# Patient Record
Sex: MALE | Race: WHITE | NOT HISPANIC OR LATINO | Employment: UNEMPLOYED | URBAN - METROPOLITAN AREA
[De-identification: names, ages, dates, MRNs, and addresses within clinical notes are randomized per-mention and may not be internally consistent; named-entity substitution may affect disease eponyms.]

---

## 2018-11-22 ENCOUNTER — APPOINTMENT (EMERGENCY)
Dept: INTERVENTIONAL RADIOLOGY/VASCULAR | Facility: HOSPITAL | Age: 58
DRG: 246 | End: 2018-11-22
Attending: INTERNAL MEDICINE
Payer: COMMERCIAL

## 2018-11-22 ENCOUNTER — HOSPITAL ENCOUNTER (INPATIENT)
Facility: HOSPITAL | Age: 58
LOS: 10 days | DRG: 246 | End: 2018-12-02
Attending: EMERGENCY MEDICINE | Admitting: ANESTHESIOLOGY
Payer: COMMERCIAL

## 2018-11-22 ENCOUNTER — APPOINTMENT (INPATIENT)
Dept: RADIOLOGY | Facility: HOSPITAL | Age: 58
DRG: 246 | End: 2018-11-22
Payer: COMMERCIAL

## 2018-11-22 DIAGNOSIS — I21.3 STEMI (ST ELEVATION MYOCARDIAL INFARCTION) (HCC): ICD-10-CM

## 2018-11-22 DIAGNOSIS — R33.9 URINARY RETENTION: ICD-10-CM

## 2018-11-22 DIAGNOSIS — I25.10 CAD (CORONARY ARTERY DISEASE): ICD-10-CM

## 2018-11-22 DIAGNOSIS — I46.9 CARDIAC ARREST (HCC): Primary | ICD-10-CM

## 2018-11-22 DIAGNOSIS — I47.2 V-TACH (HCC): ICD-10-CM

## 2018-11-22 PROBLEM — F41.9 ANXIETY: Status: ACTIVE | Noted: 2018-11-22

## 2018-11-22 PROBLEM — N17.9 AKI (ACUTE KIDNEY INJURY) (HCC): Status: ACTIVE | Noted: 2018-11-22

## 2018-11-22 PROBLEM — D72.825 BANDEMIA: Status: ACTIVE | Noted: 2018-11-22

## 2018-11-22 PROBLEM — I49.01 VENTRICULAR FIBRILLATION (HCC): Status: ACTIVE | Noted: 2018-11-22

## 2018-11-22 PROBLEM — R74.01 TRANSAMINITIS: Status: ACTIVE | Noted: 2018-11-22

## 2018-11-22 LAB
ALBUMIN SERPL BCP-MCNC: 3.8 G/DL (ref 3.5–5)
ALBUMIN SERPL BCP-MCNC: 4.2 G/DL (ref 3.5–5)
ALP SERPL-CCNC: 101 U/L (ref 46–116)
ALP SERPL-CCNC: 129 U/L (ref 46–116)
ALT SERPL W P-5'-P-CCNC: 374 U/L (ref 12–78)
ALT SERPL W P-5'-P-CCNC: 428 U/L (ref 12–78)
ANION GAP BLD CALC-SCNC: 22 MMOL/L (ref 4–13)
ANION GAP SERPL CALCULATED.3IONS-SCNC: 15 MMOL/L (ref 4–13)
ANION GAP SERPL CALCULATED.3IONS-SCNC: 20 MMOL/L (ref 4–13)
APTT PPP: 38 SECONDS (ref 26–38)
APTT PPP: 84 SECONDS (ref 26–38)
ARTERIAL PATENCY WRIST A: YES
AST SERPL W P-5'-P-CCNC: 372 U/L (ref 5–45)
AST SERPL W P-5'-P-CCNC: 416 U/L (ref 5–45)
BACTERIA UR QL AUTO: ABNORMAL /HPF
BASE EXCESS BLDA CALC-SCNC: -6.1 MMOL/L
BASE EXCESS BLDA CALC-SCNC: -9.7 MMOL/L
BASOPHILS # BLD AUTO: 0.05 THOUSANDS/ΜL (ref 0–0.1)
BASOPHILS # BLD MANUAL: 0 THOUSAND/UL (ref 0–0.1)
BASOPHILS NFR BLD AUTO: 0 % (ref 0–1)
BASOPHILS NFR MAR MANUAL: 0 % (ref 0–1)
BILIRUB DIRECT SERPL-MCNC: 0.21 MG/DL (ref 0–0.2)
BILIRUB SERPL-MCNC: 0.6 MG/DL (ref 0.2–1)
BILIRUB SERPL-MCNC: 0.7 MG/DL (ref 0.2–1)
BILIRUB UR QL STRIP: NEGATIVE
BODY TEMPERATURE: 97.3 DEGREES FEHRENHEIT
BUN BLD-MCNC: 21 MG/DL (ref 5–25)
BUN SERPL-MCNC: 19 MG/DL (ref 5–25)
BUN SERPL-MCNC: 20 MG/DL (ref 5–25)
CA-I BLD-SCNC: 1.14 MMOL/L (ref 1.12–1.32)
CA-I BLD-SCNC: 1.15 MMOL/L (ref 1.12–1.32)
CALCIUM SERPL-MCNC: 8.8 MG/DL (ref 8.3–10.1)
CALCIUM SERPL-MCNC: 9.1 MG/DL (ref 8.3–10.1)
CHLORIDE BLD-SCNC: 104 MMOL/L (ref 100–108)
CHLORIDE SERPL-SCNC: 101 MMOL/L (ref 100–108)
CHLORIDE SERPL-SCNC: 102 MMOL/L (ref 100–108)
CHOLEST SERPL-MCNC: 141 MG/DL (ref 50–200)
CLARITY UR: CLEAR
CO2 SERPL-SCNC: 18 MMOL/L (ref 21–32)
CO2 SERPL-SCNC: 22 MMOL/L (ref 21–32)
COLOR UR: YELLOW
CREAT BLD-MCNC: 1.3 MG/DL (ref 0.6–1.3)
CREAT SERPL-MCNC: 1.15 MG/DL (ref 0.6–1.3)
CREAT SERPL-MCNC: 1.57 MG/DL (ref 0.6–1.3)
EOSINOPHIL # BLD AUTO: 0.01 THOUSAND/ΜL (ref 0–0.61)
EOSINOPHIL # BLD MANUAL: 0.31 THOUSAND/UL (ref 0–0.4)
EOSINOPHIL NFR BLD AUTO: 0 % (ref 0–6)
EOSINOPHIL NFR BLD MANUAL: 1 % (ref 0–6)
ERYTHROCYTE [DISTWIDTH] IN BLOOD BY AUTOMATED COUNT: 13.4 % (ref 11.6–15.1)
ERYTHROCYTE [DISTWIDTH] IN BLOOD BY AUTOMATED COUNT: 13.6 % (ref 11.6–15.1)
GFR SERPL CREATININE-BSD FRML MDRD: 49 ML/MIN/1.73SQ M
GFR SERPL CREATININE-BSD FRML MDRD: 57 ML/MIN/1.73SQ M
GFR SERPL CREATININE-BSD FRML MDRD: 71 ML/MIN/1.73SQ M
GLUCOSE SERPL-MCNC: 226 MG/DL (ref 65–140)
GLUCOSE SERPL-MCNC: 257 MG/DL (ref 65–140)
GLUCOSE SERPL-MCNC: 313 MG/DL (ref 65–140)
GLUCOSE SERPL-MCNC: 323 MG/DL (ref 65–140)
GLUCOSE SERPL-MCNC: 332 MG/DL (ref 65–140)
GLUCOSE UR STRIP-MCNC: ABNORMAL MG/DL
HCO3 BLDA-SCNC: 17.3 MMOL/L (ref 22–28)
HCO3 BLDA-SCNC: 19.3 MMOL/L (ref 22–28)
HCT VFR BLD AUTO: 44.6 % (ref 36.5–49.3)
HCT VFR BLD AUTO: 48.4 % (ref 36.5–49.3)
HCT VFR BLD CALC: 49 % (ref 36.5–49.3)
HDLC SERPL-MCNC: 37 MG/DL (ref 40–60)
HGB BLD-MCNC: 14.6 G/DL (ref 12–17)
HGB BLD-MCNC: 15.9 G/DL (ref 12–17)
HGB BLDA-MCNC: 16.7 G/DL (ref 12–17)
HGB UR QL STRIP.AUTO: ABNORMAL
HOROWITZ INDEX BLDA+IHG-RTO: 65 MM[HG]
HOROWITZ INDEX BLDA+IHG-RTO: 65 MM[HG]
HYALINE CASTS #/AREA URNS LPF: ABNORMAL /LPF
I-TIME: 0.9
IMM GRANULOCYTES # BLD AUTO: 0.3 THOUSAND/UL (ref 0–0.2)
IMM GRANULOCYTES NFR BLD AUTO: 1 % (ref 0–2)
INR PPP: 1.17 (ref 0.86–1.17)
INR PPP: 1.36 (ref 0.86–1.17)
INR PPP: 2.09 (ref 0.86–1.17)
KETONES UR STRIP-MCNC: NEGATIVE MG/DL
LACTATE SERPL-SCNC: 2.5 MMOL/L (ref 0.5–2)
LACTATE SERPL-SCNC: 5.4 MMOL/L (ref 0.5–2)
LDLC SERPL CALC-MCNC: 81 MG/DL (ref 0–100)
LEUKOCYTE ESTERASE UR QL STRIP: NEGATIVE
LIPASE SERPL-CCNC: 506 U/L (ref 73–393)
LYMPHOCYTES # BLD AUTO: 1.02 THOUSANDS/ΜL (ref 0.6–4.47)
LYMPHOCYTES # BLD AUTO: 13 % (ref 14–44)
LYMPHOCYTES # BLD AUTO: 4.05 THOUSAND/UL (ref 0.6–4.47)
LYMPHOCYTES NFR BLD AUTO: 4 % (ref 14–44)
MAGNESIUM SERPL-MCNC: 2.6 MG/DL (ref 1.6–2.6)
MAGNESIUM SERPL-MCNC: 3.3 MG/DL (ref 1.6–2.6)
MCH RBC QN AUTO: 30.1 PG (ref 26.8–34.3)
MCH RBC QN AUTO: 30.1 PG (ref 26.8–34.3)
MCHC RBC AUTO-ENTMCNC: 32.7 G/DL (ref 31.4–37.4)
MCHC RBC AUTO-ENTMCNC: 32.9 G/DL (ref 31.4–37.4)
MCV RBC AUTO: 92 FL (ref 82–98)
MCV RBC AUTO: 92 FL (ref 82–98)
MONOCYTES # BLD AUTO: 0.93 THOUSAND/UL (ref 0–1.22)
MONOCYTES # BLD AUTO: 1.43 THOUSAND/ΜL (ref 0.17–1.22)
MONOCYTES NFR BLD AUTO: 6 % (ref 4–12)
MONOCYTES NFR BLD: 3 % (ref 4–12)
NEUTROPHILS # BLD AUTO: 20.72 THOUSANDS/ΜL (ref 1.85–7.62)
NEUTROPHILS # BLD MANUAL: 25.85 THOUSAND/UL (ref 1.85–7.62)
NEUTS BAND NFR BLD MANUAL: 8 % (ref 0–8)
NEUTS SEG NFR BLD AUTO: 75 % (ref 43–75)
NEUTS SEG NFR BLD AUTO: 89 % (ref 43–75)
NITRITE UR QL STRIP: NEGATIVE
NON-SQ EPI CELLS URNS QL MICRO: ABNORMAL /HPF
NONHDLC SERPL-MCNC: 104 MG/DL
NRBC BLD AUTO-RTO: 0 /100 WBCS
NRBC BLD AUTO-RTO: 0 /100 WBCS
O2 CT BLDA-SCNC: 20.7 ML/DL (ref 16–23)
O2 CT BLDA-SCNC: 21 ML/DL (ref 16–23)
OXYHGB MFR BLDA: 97.2 % (ref 94–97)
OXYHGB MFR BLDA: 98.4 % (ref 94–97)
PCO2 BLD: 19 MMOL/L (ref 21–32)
PCO2 BLDA: 38.3 MM HG (ref 36–44)
PCO2 BLDA: 41.7 MM HG (ref 36–44)
PEEP RESPIRATORY: 7 CM[H2O]
PEEP RESPIRATORY: 7 CM[H2O]
PH BLDA: 7.24 [PH] (ref 7.35–7.45)
PH BLDA: 7.32 [PH] (ref 7.35–7.45)
PH UR STRIP.AUTO: 6 [PH] (ref 4.5–8)
PHOSPHATE SERPL-MCNC: 4.2 MG/DL (ref 2.7–4.5)
PHOSPHATE SERPL-MCNC: 7.2 MG/DL (ref 2.7–4.5)
PLATELET # BLD AUTO: 323 THOUSANDS/UL (ref 149–390)
PLATELET # BLD AUTO: 369 THOUSANDS/UL (ref 149–390)
PLATELET BLD QL SMEAR: ADEQUATE
PMV BLD AUTO: 9.4 FL (ref 8.9–12.7)
PMV BLD AUTO: 9.7 FL (ref 8.9–12.7)
PO2 BLDA: 153.9 MM HG (ref 75–129)
PO2 BLDA: 272.6 MM HG (ref 75–129)
POTASSIUM BLD-SCNC: 3.4 MMOL/L (ref 3.5–5.3)
POTASSIUM SERPL-SCNC: 3.3 MMOL/L (ref 3.5–5.3)
POTASSIUM SERPL-SCNC: 3.8 MMOL/L (ref 3.5–5.3)
PROT SERPL-MCNC: 7.3 G/DL (ref 6.4–8.2)
PROT SERPL-MCNC: 8.1 G/DL (ref 6.4–8.2)
PROT UR STRIP-MCNC: ABNORMAL MG/DL
PROTHROMBIN TIME: 14.8 SECONDS (ref 11.8–14.2)
PROTHROMBIN TIME: 16.7 SECONDS (ref 11.8–14.2)
PROTHROMBIN TIME: 23.2 SECONDS (ref 11.8–14.2)
RBC # BLD AUTO: 4.85 MILLION/UL (ref 3.88–5.62)
RBC # BLD AUTO: 5.28 MILLION/UL (ref 3.88–5.62)
RBC #/AREA URNS AUTO: ABNORMAL /HPF
SODIUM BLD-SCNC: 140 MMOL/L (ref 136–145)
SODIUM SERPL-SCNC: 139 MMOL/L (ref 136–145)
SODIUM SERPL-SCNC: 139 MMOL/L (ref 136–145)
SP GR UR STRIP.AUTO: 1.01 (ref 1–1.03)
SPECIMEN SOURCE: ABNORMAL
T4 FREE SERPL-MCNC: 0.89 NG/DL (ref 0.76–1.46)
TOTAL CELLS COUNTED SPEC: 100
TRIGL SERPL-MCNC: 117 MG/DL
TROPONIN I BLD-MCNC: 0.54 NG/ML (ref 0–0.08)
TROPONIN I SERPL-MCNC: 1.11 NG/ML
TROPONIN I SERPL-MCNC: 10.63 NG/ML
TROPONIN I SERPL-MCNC: 33.74 NG/ML
TROPONIN I SERPL-MCNC: 5.2 NG/ML
TSH SERPL DL<=0.05 MIU/L-ACNC: 5.99 UIU/ML (ref 0.36–3.74)
UROBILINOGEN UR QL STRIP.AUTO: 0.2 E.U./DL
VENT AC: 18
VENT AC: 22
VENT- AC: AC
VENT- AC: AC
VT SETTING VENT: 400 ML
VT SETTING VENT: 400 ML
WBC # BLD AUTO: 23.53 THOUSAND/UL (ref 4.31–10.16)
WBC # BLD AUTO: 31.15 THOUSAND/UL (ref 4.31–10.16)
WBC #/AREA URNS AUTO: ABNORMAL /HPF

## 2018-11-22 PROCEDURE — 027034Z DILATION OF CORONARY ARTERY, ONE ARTERY WITH DRUG-ELUTING INTRALUMINAL DEVICE, PERCUTANEOUS APPROACH: ICD-10-PCS | Performed by: INTERNAL MEDICINE

## 2018-11-22 PROCEDURE — 36600 WITHDRAWAL OF ARTERIAL BLOOD: CPT

## 2018-11-22 PROCEDURE — C1725 CATH, TRANSLUMIN NON-LASER: HCPCS | Performed by: INTERNAL MEDICINE

## 2018-11-22 PROCEDURE — C1887 CATHETER, GUIDING: HCPCS | Performed by: INTERNAL MEDICINE

## 2018-11-22 PROCEDURE — 84100 ASSAY OF PHOSPHORUS: CPT | Performed by: PHYSICIAN ASSISTANT

## 2018-11-22 PROCEDURE — 5A1935Z RESPIRATORY VENTILATION, LESS THAN 24 CONSECUTIVE HOURS: ICD-10-PCS | Performed by: EMERGENCY MEDICINE

## 2018-11-22 PROCEDURE — C1769 GUIDE WIRE: HCPCS | Performed by: INTERNAL MEDICINE

## 2018-11-22 PROCEDURE — 84443 ASSAY THYROID STIM HORMONE: CPT | Performed by: PHYSICIAN ASSISTANT

## 2018-11-22 PROCEDURE — 99152 MOD SED SAME PHYS/QHP 5/>YRS: CPT | Performed by: INTERNAL MEDICINE

## 2018-11-22 PROCEDURE — 99291 CRITICAL CARE FIRST HOUR: CPT | Performed by: PHYSICIAN ASSISTANT

## 2018-11-22 PROCEDURE — 80061 LIPID PANEL: CPT | Performed by: PHYSICIAN ASSISTANT

## 2018-11-22 PROCEDURE — 83735 ASSAY OF MAGNESIUM: CPT | Performed by: PHYSICIAN ASSISTANT

## 2018-11-22 PROCEDURE — 87040 BLOOD CULTURE FOR BACTERIA: CPT | Performed by: PHYSICIAN ASSISTANT

## 2018-11-22 PROCEDURE — 85730 THROMBOPLASTIN TIME PARTIAL: CPT | Performed by: EMERGENCY MEDICINE

## 2018-11-22 PROCEDURE — 94760 N-INVAS EAR/PLS OXIMETRY 1: CPT

## 2018-11-22 PROCEDURE — 71045 X-RAY EXAM CHEST 1 VIEW: CPT

## 2018-11-22 PROCEDURE — 85610 PROTHROMBIN TIME: CPT | Performed by: EMERGENCY MEDICINE

## 2018-11-22 PROCEDURE — 80047 BASIC METABLC PNL IONIZED CA: CPT

## 2018-11-22 PROCEDURE — 80053 COMPREHEN METABOLIC PANEL: CPT | Performed by: PHYSICIAN ASSISTANT

## 2018-11-22 PROCEDURE — 85610 PROTHROMBIN TIME: CPT | Performed by: PHYSICIAN ASSISTANT

## 2018-11-22 PROCEDURE — 85014 HEMATOCRIT: CPT

## 2018-11-22 PROCEDURE — C9606 PERC D-E COR REVASC W AMI S: HCPCS | Performed by: INTERNAL MEDICINE

## 2018-11-22 PROCEDURE — 4A133B1 MONITORING OF ARTERIAL PRESSURE, PERIPHERAL, PERCUTANEOUS APPROACH: ICD-10-PCS | Performed by: EMERGENCY MEDICINE

## 2018-11-22 PROCEDURE — 93005 ELECTROCARDIOGRAM TRACING: CPT

## 2018-11-22 PROCEDURE — 84484 ASSAY OF TROPONIN QUANT: CPT | Performed by: PHYSICIAN ASSISTANT

## 2018-11-22 PROCEDURE — C1760 CLOSURE DEV, VASC: HCPCS | Performed by: INTERNAL MEDICINE

## 2018-11-22 PROCEDURE — B211YZZ FLUOROSCOPY OF MULTIPLE CORONARY ARTERIES USING OTHER CONTRAST: ICD-10-PCS | Performed by: INTERNAL MEDICINE

## 2018-11-22 PROCEDURE — 80048 BASIC METABOLIC PNL TOTAL CA: CPT | Performed by: EMERGENCY MEDICINE

## 2018-11-22 PROCEDURE — 80076 HEPATIC FUNCTION PANEL: CPT | Performed by: EMERGENCY MEDICINE

## 2018-11-22 PROCEDURE — 85027 COMPLETE CBC AUTOMATED: CPT | Performed by: EMERGENCY MEDICINE

## 2018-11-22 PROCEDURE — C9600 PERC DRUG-EL COR STENT SING: HCPCS | Performed by: INTERNAL MEDICINE

## 2018-11-22 PROCEDURE — 85610 PROTHROMBIN TIME: CPT | Performed by: INTERNAL MEDICINE

## 2018-11-22 PROCEDURE — 85007 BL SMEAR W/DIFF WBC COUNT: CPT | Performed by: EMERGENCY MEDICINE

## 2018-11-22 PROCEDURE — 96375 TX/PRO/DX INJ NEW DRUG ADDON: CPT

## 2018-11-22 PROCEDURE — C1894 INTRO/SHEATH, NON-LASER: HCPCS | Performed by: INTERNAL MEDICINE

## 2018-11-22 PROCEDURE — 83735 ASSAY OF MAGNESIUM: CPT | Performed by: EMERGENCY MEDICINE

## 2018-11-22 PROCEDURE — 93458 L HRT ARTERY/VENTRICLE ANGIO: CPT | Performed by: INTERNAL MEDICINE

## 2018-11-22 PROCEDURE — 92950 HEART/LUNG RESUSCITATION CPR: CPT

## 2018-11-22 PROCEDURE — 84100 ASSAY OF PHOSPHORUS: CPT | Performed by: EMERGENCY MEDICINE

## 2018-11-22 PROCEDURE — 03HY32Z INSERTION OF MONITORING DEVICE INTO UPPER ARTERY, PERCUTANEOUS APPROACH: ICD-10-PCS | Performed by: EMERGENCY MEDICINE

## 2018-11-22 PROCEDURE — 84484 ASSAY OF TROPONIN QUANT: CPT | Performed by: EMERGENCY MEDICINE

## 2018-11-22 PROCEDURE — 4A023N7 MEASUREMENT OF CARDIAC SAMPLING AND PRESSURE, LEFT HEART, PERCUTANEOUS APPROACH: ICD-10-PCS | Performed by: INTERNAL MEDICINE

## 2018-11-22 PROCEDURE — 94002 VENT MGMT INPAT INIT DAY: CPT

## 2018-11-22 PROCEDURE — 83605 ASSAY OF LACTIC ACID: CPT | Performed by: PHYSICIAN ASSISTANT

## 2018-11-22 PROCEDURE — 85025 COMPLETE CBC W/AUTO DIFF WBC: CPT | Performed by: INTERNAL MEDICINE

## 2018-11-22 PROCEDURE — 83690 ASSAY OF LIPASE: CPT | Performed by: EMERGENCY MEDICINE

## 2018-11-22 PROCEDURE — 02703ZZ DILATION OF CORONARY ARTERY, ONE ARTERY, PERCUTANEOUS APPROACH: ICD-10-PCS | Performed by: INTERNAL MEDICINE

## 2018-11-22 PROCEDURE — C9460 INJECTION, CANGRELOR: HCPCS | Performed by: INTERNAL MEDICINE

## 2018-11-22 PROCEDURE — 36415 COLL VENOUS BLD VENIPUNCTURE: CPT | Performed by: EMERGENCY MEDICINE

## 2018-11-22 PROCEDURE — 85730 THROMBOPLASTIN TIME PARTIAL: CPT | Performed by: PHYSICIAN ASSISTANT

## 2018-11-22 PROCEDURE — 82948 REAGENT STRIP/BLOOD GLUCOSE: CPT

## 2018-11-22 PROCEDURE — 84484 ASSAY OF TROPONIN QUANT: CPT

## 2018-11-22 PROCEDURE — 82805 BLOOD GASES W/O2 SATURATION: CPT | Performed by: PHYSICIAN ASSISTANT

## 2018-11-22 PROCEDURE — 99153 MOD SED SAME PHYS/QHP EA: CPT | Performed by: INTERNAL MEDICINE

## 2018-11-22 PROCEDURE — 84439 ASSAY OF FREE THYROXINE: CPT | Performed by: PHYSICIAN ASSISTANT

## 2018-11-22 PROCEDURE — 81001 URINALYSIS AUTO W/SCOPE: CPT | Performed by: PHYSICIAN ASSISTANT

## 2018-11-22 PROCEDURE — 82330 ASSAY OF CALCIUM: CPT | Performed by: PHYSICIAN ASSISTANT

## 2018-11-22 PROCEDURE — C1874 STENT, COATED/COV W/DEL SYS: HCPCS

## 2018-11-22 PROCEDURE — 96366 THER/PROPH/DIAG IV INF ADDON: CPT

## 2018-11-22 PROCEDURE — 4A133J1 MONITORING OF ARTERIAL PULSE, PERIPHERAL, PERCUTANEOUS APPROACH: ICD-10-PCS | Performed by: EMERGENCY MEDICINE

## 2018-11-22 PROCEDURE — 0BH18EZ INSERTION OF ENDOTRACHEAL AIRWAY INTO TRACHEA, VIA NATURAL OR ARTIFICIAL OPENING ENDOSCOPIC: ICD-10-PCS | Performed by: EMERGENCY MEDICINE

## 2018-11-22 PROCEDURE — 96365 THER/PROPH/DIAG IV INF INIT: CPT

## 2018-11-22 PROCEDURE — 99285 EMERGENCY DEPT VISIT HI MDM: CPT

## 2018-11-22 RX ORDER — FENTANYL CITRATE 50 UG/ML
100 INJECTION, SOLUTION INTRAMUSCULAR; INTRAVENOUS ONCE
Status: COMPLETED | OUTPATIENT
Start: 2018-11-22 | End: 2018-11-22

## 2018-11-22 RX ORDER — PROPOFOL 10 MG/ML
5-50 INJECTION, EMULSION INTRAVENOUS
Status: DISCONTINUED | OUTPATIENT
Start: 2018-11-22 | End: 2018-11-23

## 2018-11-22 RX ORDER — NICOTINE 21 MG/24HR
14 PATCH, TRANSDERMAL 24 HOURS TRANSDERMAL DAILY
Status: DISCONTINUED | OUTPATIENT
Start: 2018-11-22 | End: 2018-12-02 | Stop reason: HOSPADM

## 2018-11-22 RX ORDER — ASPIRIN 81 MG/1
81 TABLET, CHEWABLE ORAL DAILY
Status: DISCONTINUED | OUTPATIENT
Start: 2018-11-23 | End: 2018-12-02 | Stop reason: HOSPADM

## 2018-11-22 RX ORDER — PROPOFOL 10 MG/ML
5-50 INJECTION, EMULSION INTRAVENOUS
Status: DISCONTINUED | OUTPATIENT
Start: 2018-11-22 | End: 2018-11-22

## 2018-11-22 RX ORDER — CHLORHEXIDINE GLUCONATE 0.12 MG/ML
15 RINSE ORAL EVERY 12 HOURS SCHEDULED
Status: DISCONTINUED | OUTPATIENT
Start: 2018-11-22 | End: 2018-11-22 | Stop reason: SDUPTHER

## 2018-11-22 RX ORDER — PROPOFOL 10 MG/ML
INJECTION, EMULSION INTRAVENOUS CODE/TRAUMA/SEDATION MEDICATION
Status: COMPLETED | OUTPATIENT
Start: 2018-11-22 | End: 2018-11-22

## 2018-11-22 RX ORDER — POTASSIUM CHLORIDE 20MEQ/15ML
20 LIQUID (ML) ORAL ONCE
Status: COMPLETED | OUTPATIENT
Start: 2018-11-22 | End: 2018-11-22

## 2018-11-22 RX ORDER — SODIUM CHLORIDE, SODIUM GLUCONATE, SODIUM ACETATE, POTASSIUM CHLORIDE, MAGNESIUM CHLORIDE, SODIUM PHOSPHATE, DIBASIC, AND POTASSIUM PHOSPHATE .53; .5; .37; .037; .03; .012; .00082 G/100ML; G/100ML; G/100ML; G/100ML; G/100ML; G/100ML; G/100ML
75 INJECTION, SOLUTION INTRAVENOUS CONTINUOUS
Status: DISCONTINUED | OUTPATIENT
Start: 2018-11-22 | End: 2018-11-27

## 2018-11-22 RX ORDER — AMIODARONE HYDROCHLORIDE 50 MG/ML
INJECTION, SOLUTION INTRAVENOUS
Status: DISPENSED
Start: 2018-11-22 | End: 2018-11-23

## 2018-11-22 RX ORDER — GINSENG 100 MG
1 CAPSULE ORAL 2 TIMES DAILY
Status: DISCONTINUED | OUTPATIENT
Start: 2018-11-22 | End: 2018-12-02 | Stop reason: HOSPADM

## 2018-11-22 RX ORDER — ATORVASTATIN CALCIUM 40 MG/1
40 TABLET, FILM COATED ORAL
Status: DISCONTINUED | OUTPATIENT
Start: 2018-11-22 | End: 2018-12-02 | Stop reason: HOSPADM

## 2018-11-22 RX ORDER — ACETAMINOPHEN 325 MG/1
325 TABLET ORAL EVERY 6 HOURS PRN
Status: DISCONTINUED | OUTPATIENT
Start: 2018-11-22 | End: 2018-11-24

## 2018-11-22 RX ORDER — CHLORHEXIDINE GLUCONATE 0.12 MG/ML
15 RINSE ORAL EVERY 12 HOURS SCHEDULED
Status: DISCONTINUED | OUTPATIENT
Start: 2018-11-22 | End: 2018-11-28

## 2018-11-22 RX ORDER — ETOMIDATE 2 MG/ML
30 INJECTION INTRAVENOUS ONCE
Status: COMPLETED | OUTPATIENT
Start: 2018-11-22 | End: 2018-11-22

## 2018-11-22 RX ORDER — NITROGLYCERIN 20 MG/100ML
INJECTION INTRAVENOUS
Status: COMPLETED | OUTPATIENT
Start: 2018-11-22 | End: 2018-11-22

## 2018-11-22 RX ORDER — SODIUM CHLORIDE 9 MG/ML
75 INJECTION, SOLUTION INTRAVENOUS CONTINUOUS
Status: DISCONTINUED | OUTPATIENT
Start: 2018-11-22 | End: 2018-11-22

## 2018-11-22 RX ORDER — LIDOCAINE HYDROCHLORIDE 10 MG/ML
INJECTION, SOLUTION INFILTRATION; PERINEURAL CODE/TRAUMA/SEDATION MEDICATION
Status: COMPLETED | OUTPATIENT
Start: 2018-11-22 | End: 2018-11-22

## 2018-11-22 RX ORDER — BUSPIRONE HYDROCHLORIDE 15 MG/1
15 TABLET ORAL 2 TIMES DAILY
COMMUNITY
End: 2018-12-04 | Stop reason: HOSPADM

## 2018-11-22 RX ORDER — MIDAZOLAM HYDROCHLORIDE 1 MG/ML
INJECTION INTRAMUSCULAR; INTRAVENOUS CODE/TRAUMA/SEDATION MEDICATION
Status: COMPLETED | OUTPATIENT
Start: 2018-11-22 | End: 2018-11-22

## 2018-11-22 RX ORDER — LIDOCAINE 50 MG/G
1 PATCH TOPICAL DAILY
Status: DISCONTINUED | OUTPATIENT
Start: 2018-11-22 | End: 2018-11-24

## 2018-11-22 RX ORDER — FENTANYL CITRATE 50 UG/ML
INJECTION, SOLUTION INTRAMUSCULAR; INTRAVENOUS
Status: DISPENSED
Start: 2018-11-22 | End: 2018-11-23

## 2018-11-22 RX ORDER — ACETAMINOPHEN 325 MG/1
650 TABLET ORAL EVERY 6 HOURS PRN
Status: DISCONTINUED | OUTPATIENT
Start: 2018-11-22 | End: 2018-11-22

## 2018-11-22 RX ADMIN — MIDAZOLAM HYDROCHLORIDE 1 MG: 1 INJECTION, SOLUTION INTRAMUSCULAR; INTRAVENOUS at 14:00

## 2018-11-22 RX ADMIN — LIDOCAINE 1 PATCH: 50 PATCH CUTANEOUS at 16:15

## 2018-11-22 RX ADMIN — SODIUM CHLORIDE, SODIUM LACTATE, POTASSIUM CHLORIDE, AND CALCIUM CHLORIDE 250 ML: .6; .31; .03; .02 INJECTION, SOLUTION INTRAVENOUS at 16:13

## 2018-11-22 RX ADMIN — PROPOFOL 17 MCG/KG/MIN: 10 INJECTION, EMULSION INTRAVENOUS at 20:35

## 2018-11-22 RX ADMIN — MIDAZOLAM HYDROCHLORIDE 1 MG: 1 INJECTION, SOLUTION INTRAMUSCULAR; INTRAVENOUS at 13:07

## 2018-11-22 RX ADMIN — PROPOFOL 15 MCG/KG/MIN: 10 INJECTION, EMULSION INTRAVENOUS at 16:17

## 2018-11-22 RX ADMIN — LIDOCAINE HYDROCHLORIDE 10 ML: 10 INJECTION, SOLUTION INFILTRATION; PERINEURAL at 12:36

## 2018-11-22 RX ADMIN — POTASSIUM CHLORIDE 20 MEQ: 20 SOLUTION ORAL at 16:27

## 2018-11-22 RX ADMIN — NITROGLYCERIN 20 MCG: 20 INJECTION INTRAVENOUS at 13:15

## 2018-11-22 RX ADMIN — CANGRELOR 132 MCG/MIN: 50 INJECTION, POWDER, LYOPHILIZED, FOR SOLUTION INTRAVENOUS at 12:43

## 2018-11-22 RX ADMIN — IODIXANOL 250 ML: 320 INJECTION, SOLUTION INTRAVASCULAR at 14:06

## 2018-11-22 RX ADMIN — MIDAZOLAM HYDROCHLORIDE 1 MG: 1 INJECTION, SOLUTION INTRAMUSCULAR; INTRAVENOUS at 12:44

## 2018-11-22 RX ADMIN — NICOTINE 14 MG: 14 PATCH TRANSDERMAL at 16:00

## 2018-11-22 RX ADMIN — TICAGRELOR 180 MG: 90 TABLET ORAL at 14:03

## 2018-11-22 RX ADMIN — TICAGRELOR 90 MG: 90 TABLET ORAL at 20:01

## 2018-11-22 RX ADMIN — PROPOFOL 15 MCG/KG/MIN: 10 INJECTION, EMULSION INTRAVENOUS at 17:45

## 2018-11-22 RX ADMIN — PROPOFOL 20 MG: 10 INJECTION, EMULSION INTRAVENOUS at 14:01

## 2018-11-22 RX ADMIN — SODIUM CHLORIDE, SODIUM GLUCONATE, SODIUM ACETATE, POTASSIUM CHLORIDE AND MAGNESIUM CHLORIDE 75 ML/HR: 526; 502; 368; 37; 30 INJECTION, SOLUTION INTRAVENOUS at 17:42

## 2018-11-22 RX ADMIN — BACITRACIN ZINC 1 LARGE APPLICATION: 500 OINTMENT TOPICAL at 18:10

## 2018-11-22 RX ADMIN — INSULIN LISPRO 2 UNITS: 100 INJECTION, SOLUTION INTRAVENOUS; SUBCUTANEOUS at 18:12

## 2018-11-22 RX ADMIN — FENTANYL CITRATE 100 MCG: 50 INJECTION, SOLUTION INTRAMUSCULAR; INTRAVENOUS at 12:27

## 2018-11-22 RX ADMIN — ATORVASTATIN CALCIUM 40 MG: 40 TABLET, FILM COATED ORAL at 18:00

## 2018-11-22 RX ADMIN — ETOMIDATE 30 MG: 2 INJECTION, SOLUTION INTRAVENOUS at 12:25

## 2018-11-22 RX ADMIN — CHLORHEXIDINE GLUCONATE 0.12% ORAL RINSE 15 ML: 1.2 LIQUID ORAL at 20:01

## 2018-11-22 NOTE — CONSULTS
Consultation - Cardiology   Billy Zhang 66 y o  male MRN: [id number]  Unit/Bed#: [facility] Encounter: [id number]    Assessment/Plan     Assessment:  1  Acute cardiac arrest  2  STEMI  Plan:  Cardiac catheterization  History of Present Illness   Physician Requesting Consult: Klaus Thomas DO  Reason for Consult / Principal Problem: SCA  HPI: Angela Cdae is a 66y o  year old male who presents with out of hospital cardiac arrest   ECG c/w STEMI  Consults    Review of Systems   Unable to perform ROS: Intubated       Historical Information   No past medical history on file  No past surgical history on file  History   Alcohol use Not on file     History   Drug use: Unknown     History   Smoking Status    Not on file   Smokeless Tobacco    Not on file     Family History: No family history on file  Meds/Allergies   current meds:   Current Facility-Administered Medications   Medication Dose Route Frequency    amiodarone 150 mg/3 mL injection **ADS Override Pull**        fentanyl citrate (PF) 100 MCG/2ML **ADS Override Pull**        and PTA meds:   None     Allergies not on file    Objective   Vitals: Blood pressure (!) 216/111, pulse (!) 120, resp  rate 21, SpO2 96 %  Orthostatic Blood Pressures      Most Recent Value   Blood Pressure   216/111 filed at 11/22/2018 1226          No intake or output data in the 24 hours ending 11/22/18 1416    Invasive Devices     Peripheral Intravenous Line            Peripheral IV Right Antecubital -- days    Peripheral IV 11/22/18 Left Antecubital less than 1 day          Intraosseous Line            Intraosseous Line Tibia -- days          Airway            ETT  8 mm less than 1 day                Physical Exam   Constitutional: He appears well-developed and well-nourished  Cardiovascular: Normal rate, regular rhythm and normal heart sounds  No murmur heard    Pulmonary/Chest: Breath sounds normal        Lab Results:   CBC with diff:   Results from last 7 days  Lab Units 11/22/18  1240   WBC Thousand/uL 31 15*   RBC Million/uL 5 28   HEMOGLOBIN g/dL 15 9   HEMATOCRIT % 48 4   MCV fL 92   MCH pg 30 1   MCHC g/dL 32 9   RDW % 13 4   MPV fL 9 7   PLATELETS Thousands/uL 369     CMP:   Results from last 7 days  Lab Units 11/22/18  1240 11/22/18  1226   SODIUM mmol/L 139  --    POTASSIUM mmol/L 3 3*  --    CHLORIDE mmol/L 101  --    CO2 mmol/L 18*  --    CO2, I-STAT mmol/L  --  19*   BUN mg/dL 20  --    CREATININE mg/dL 1 57*  --    GLUCOSE, ISTAT mg/dl  --  332*   CALCIUM mg/dL 9 1  --    AST U/L 416*  --    ALT U/L 428*  --    ALK PHOS U/L 129*  --    EGFR ml/min/1 73sq m 49 57     Troponin:   0  Lab Value Date/Time   TROPONINI 1 11 (H) 11/22/2018 1240     BNP:   Results from last 7 days  Lab Units 11/22/18  1240 11/22/18  1226   POTASSIUM mmol/L 3 3*  --    CHLORIDE mmol/L 101  --    CO2 mmol/L 18*  --    CO2, I-STAT mmol/L  --  19*   BUN mg/dL 20  --    CREATININE mg/dL 1 57*  --    GLUCOSE, ISTAT mg/dl  --  332*   CALCIUM mg/dL 9 1  --    EGFR ml/min/1 73sq m 49 57     Coags:   Results from last 7 days  Lab Units 11/22/18  1240   PTT seconds 38   INR  1 17     TSH:     Magnesium:   Results from last 7 days  Lab Units 11/22/18  1240   MAGNESIUM mg/dL 3 3*     Lipid Profile:     Imaging: I have personally reviewed pertinent reports  EKG:   VTE Prophylaxis:     Code Status: No Order  Advance Directive and Living Will:      Power of :    POLST:      Counseling / Coordination of Care  Total floor / unit time spent today 90 minutes  Greater than 50% of total time was spent with the patient and / or family counseling and / or coordination of care  A description of the counseling / coordination of care: 90

## 2018-11-22 NOTE — RESPIRATORY THERAPY NOTE
RT Ventilator Management Note  Billy Zhang 66 y o  male MRN: [id number]  Unit/Bed#:  Encounter: [id number]      Daily Screen       11/22/2018 1706             Patient safety screen outcome[de-identified] Failed    Not Ready for Weaning due to[de-identified] FiO2 >60%; Underline problem not resolved            Physical Exam:   Assessment Type: Assess only  General Appearance: Sedated  Respiratory Pattern: Assisted  Chest Assessment: Chest expansion symmetrical  Bilateral Breath Sounds: Diminished, Coarse, Rhonchi  Cough: Productive  Suction: ET Tube, Oral  O2 Device: Vent      Resp Comments: Pt remains on full mechanical ventilation VC/AC+  Pt tolerating ventilator well; will continue to monitor pt  Plan: continue current support until further orders

## 2018-11-22 NOTE — PLAN OF CARE
DISCHARGE PLANNING     Discharge to home or other facility with appropriate resources Progressing        INFECTION - ADULT     Absence or prevention of progression during hospitalization Progressing        Knowledge Deficit     Patient/family/caregiver demonstrates understanding of disease process, treatment plan, medications, and discharge instructions Progressing        PAIN - ADULT     Verbalizes/displays adequate comfort level or baseline comfort level Progressing        SAFETY ADULT     Patient will remain free of falls Progressing     Maintain or return to baseline ADL function Progressing     Maintain or return mobility status to optimal level Progressing        SAFETY,RESTRAINT: NV/NON-SELF DESTRUCTIVE BEHAVIOR     Remains free of harm/injury (restraint for non violent/non self-detsructive behavior) Progressing     Returns to optimal restraint-free functioning Progressing

## 2018-11-22 NOTE — RESPIRATORY THERAPY NOTE
RT Protocol Note  Billy Zhang 66 y o  male MRN: [id number]  Unit/Bed#: TR 30 Encounter: [id number]    Assessment    Active Problems:    * No active hospital problems  *      Home Pulmonary Medications:       No past medical history on file  Social History     Social History    Marital status: /Civil Union     Spouse name: N/A    Number of children: N/A    Years of education: N/A     Social History Main Topics    Smoking status: Not on file    Smokeless tobacco: Not on file    Alcohol use Not on file    Drug use: Unknown    Sexual activity: Not on file     Other Topics Concern    Not on file     Social History Narrative    No narrative on file       Subjective         Objective    Physical Exam:   Assessment Type: Assess only  General Appearance: Sedated  Respiratory Pattern: Assisted  Chest Assessment: Chest expansion symmetrical, Trachea midline  Bilateral Breath Sounds: Coarse, Diminished, Rhonchi  Cough: Productive  Suction: ET Tube, Oral  O2 Device: Vent    Vitals:  Blood pressure (!) 214/126, pulse (!) 120, resp  rate 21, SpO2 96 %  Imaging and other studies: I have personally reviewed pertinent reports  O2 Device: Vent     Plan    Respiratory Plan: Vent/NIV/HFNC        Resp Comments: pt came in Ed via EMS for Cardiac Arrest  Intubated in the field with a shanique tube and switched in ED for ETT w/hi-lo  transported to IR and placed on vent

## 2018-11-22 NOTE — H&P
History and Physical - Critical Care   Billy Zhang 66 y o  male MRN: [id number]  Unit/Bed#:  Encounter: [id number]    Reason for Admission / Chief Complaint:  VFib arrest    History of Present Illness:  Billy Holloway is a 66 y o  male who presents to [facility] following a witnessed cardiac arrest   Patient was at home with family and per EMS, patient's family patient began not feeling right and collapse  This found to be pulseless and CPR was initiated  911 was activated  Release arrived 1st and brought a ICD which was placed on patient patient had 2 shocks  EMS arrived to find the patient in VFib arrest   EN route, patient had an additional 6 shocks, 6 doses of epinephrine, a 300 mg amiodarone bolus and was started on amiodarone drip  Each pulse trach revealed either VFib or V-tach, final pulse check revealed pulses and sinus tachycardia  [facility]  On arrival, patient was hypertensive  Patient was moving all extremities, and was given etomidate for exchange of Samuel airway to endotracheal tube  Cath lab was activated patient went to cath lab  During cardiac catheterization patient was found to have LAD lesion  This was stented  He was loaded with Brilinta, given Angiomax, and started on Kengreal drip  He returns to the intensive care unit intubated and sedated on propofol  In interview with patient's family, patient had been relatively healthy leading up to today with the exception of the URI symptoms  Patient does smoke, but does not drink alcohol  His only medication at home is BuSpar for anxiety  He does not carry any other chronic medical conditions  History obtained from spouse, child and EMS and unobtainable from patient due to intubation  Past Medical History:  No past medical history on file  Past Surgical History:  No past surgical history on file  Past Family History:  No family history on file      Social History:  History   Smoking Status    Not on file   Smokeless Tobacco    Not on file     History   Alcohol use Not on file     History   Drug use: Unknown     Marital Status: /Civil Union  Exercise History:  Independent ADLs    Medications:  Current Facility-Administered Medications   Medication Dose Route Frequency    acetaminophen (TYLENOL) tablet 650 mg  650 mg Oral Q6H PRN    amiodarone 150 mg/3 mL injection **ADS Override Pull**        chlorhexidine (PERIDEX) 0 12 % oral rinse 15 mL  15 mL Swish & Spit Q12H [address]    fentanyl citrate (PF) 100 MCG/2ML **ADS Override Pull**        insulin lispro (HumaLOG) 100 units/mL subcutaneous injection 1-6 Units  1-6 Units Subcutaneous Q6H [address]    lidocaine (LIDODERM) 5 % patch 1 patch  1 patch Topical Daily    nicotine (NICODERM CQ) 14 mg/24hr TD 24 hr patch 14 mg  14 mg Transdermal Daily    propofol (DIPRIVAN) 1000 mg in 100 mL infusion (premix)  5-50 mcg/kg/min Intravenous Titrated     Home medications:  Prior to Admission medications    Not on File     Allergies: Allergies   Allergen Reactions    Barley Grass Throat Swelling       ROS:   Review of Systems   Unable to perform ROS: Intubated       Vitals:  Vitals:    11/22/18 1226 11/22/18 1227 11/22/18 1235 11/22/18 1456   BP: (!) 216/111      Pulse:  (!) 119 (!) 120    Resp:  22 21    SpO2:   96% 98%     Temperature:   No data recorded  Current      Weights: There is no height or weight on file to calculate BMI      Hemodynamic Monitoring:  N/A     Non-Invasive/Invasive Ventilation Settings:  Respiratory    Lab Data (Last 4 hours)    None         O2/Vent Data (Last 4 hours)      11/22 1235 11/22 1456        Drager Vent Mode AC/VC+ AC/VC+      Resp Rate (BPM) (BPM) 18 18      VT (mL) (mL) 400 400      Insp Time (S) (S) 0 9 0 9      FIO2 (%) (%) 100 65      PEEP (cmH2O) (cmH2O) 7 7      Rise Time (%) (%) 0 2 0 2      MV (Obs) 7 99 10 1                No results found for: PHART, RLU9CUO, PO2ART, OUT3ZNF, S6LJOTPG, BEART, SOURCE  SpO2: SpO2: 98 %, SpO2 Activity:  , SpO2 Device: O2 Device: Other (comment)     Physical Exam:  Physical Exam   Constitutional: He appears well-developed and well-nourished  He is intubated and restrained  Arouses to painful stimuli, moves all extremities localizing to pain   HENT:   Head: Normocephalic and atraumatic  Eyes: Pupils are equal, round, and reactive to light  Neck: Trachea normal  Neck supple  No JVD present  Cardiovascular: Normal rate and regular rhythm  Pulmonary/Chest: Breath sounds normal  He is intubated  He has no wheezes  He has no rales  Abdominal: Soft  Bowel sounds are normal  He exhibits no distension  There is no tenderness  Genitourinary:   Genitourinary Comments: Deferred   Musculoskeletal: Normal range of motion  He exhibits no edema  Neurological: GCS eye subscore is 2  GCS verbal subscore is 1  GCS motor subscore is 6  Moves all extremities, localizing to pain in endotracheal tube   Skin: Skin is warm, dry and intact  Nursing note and vitals reviewed        Labs:    Results from last 7 days  Lab Units 11/22/18  1240 11/22/18  1226   WBC Thousand/uL 31 15*  --    HEMOGLOBIN g/dL 15 9  --    I STAT HEMOGLOBIN g/dl  --  16 7   HEMATOCRIT % 48 4  --    HEMATOCRIT, ISTAT %  --  49   PLATELETS Thousands/uL 369  --    BANDS PCT % 8  --    MONO PCT % 3*  --        Results from last 7 days  Lab Units 11/22/18  1240 11/22/18  1226   SODIUM mmol/L 139  --    POTASSIUM mmol/L 3 3*  --    CHLORIDE mmol/L 101  --    CO2 mmol/L 18*  --    CO2, I-STAT mmol/L  --  19*   AGAP mmol/L  --  22*   ANION GAP mmol/L 20*  --    BUN mg/dL 20  --    CREATININE mg/dL 1 57*  --    CALCIUM mg/dL 9 1  --    ALT U/L 428*  --    AST U/L 416*  --    ALK PHOS U/L 129*  --    ALBUMIN g/dL 4 2  --    TOTAL BILIRUBIN mg/dL 0 60  --        Results from last 7 days  Lab Units 11/22/18  1240   MAGNESIUM mg/dL 3 3*   PHOSPHORUS mg/dL 7 2*        Results from last 7 days  Lab Units 11/22/18  1240   INR  1 17   PTT seconds 38       Results from last 7 days  Lab Units 11/22/18  1240   TROPONIN I ng/mL 1 11*         ABG:No results found for: PHART, OYK6NHJ, PO2ART, AQS6URK, M1AWZATV, BEART, SOURCE  VBG:            Imaging:  I have personally reviewed pertinent films in PACS  EKG:  Reviewed  This was personally reviewed by myself  Micro:        ______________________________________________________________________    Assessment:   Principal Problem:    Ventricular fibrillation (HCC)  Active Problems:    Anxiety    [facility]    CAD (coronary artery disease)    Transaminitis    Bandemia    STEVIE (acute kidney injury) ([facility] )  Resolved Problems:    * No resolved hospital problems  *    Plan:    Neuro:   Anxiety  - patient on BuSpar at baseline  - hold for now    Sedation plan: Propofol  RASS goal: 0 Alert and Calm  Sedation break plan: This afternoon    Pain control plan:  No overt current pain  Current Pain score: moderate  Chronic Home Pain Reg:   None  Regulate sleep/wake cycle, delirium precautions    CV:   VFib arrest secondary to STEMI of LAD status post PCI  - patient with PCI of LAD  - given Kengreal, Brilinta, and Angiomax  - dual anti platelet therapy per Cardiology recommendations  - beta-blocker per Cardiology recommendations  - trend troponin  - obtain echocardiogram    Cardiac infusions:  None  Rhythm: NSR  Follow rhythm on telemetry    Pulm:   Acute hypoxic respiratory failure  - status post cardiac arrest  - lung fields not indicative of congestive heart failure, or of any aspiration  - will wean ventilator to extubate  SBT plan:   Wean to extubate today  Chest PT ordered: yes   Chlorhexidine ordered: yes   HOB >30 degrees: yes      GI:   Transaminitis  - likely shock liver  - trend  Nutrition/diet plan:  NPO  Stress ulcer prophylaxis: Pepcid PO  Bowel regimen: None currently    :   STEVIE  - no known history of kidney dysfunction  - likely secondary to prerenal  - Will follow post cath  - Follow urine output  Indwelling Ramey present: yes   Urinary catheter still needed for Strict I and O in a critically ill patient  FEN:   Repeat electrolytes following cardiac catheterization  Fluid/Diuretic plan: No intervention  Goal 24 hour fluid balance:  Roughly even  Electrolytes repleted: Will replete  Goal: K >4 0, Mag >2 0, and Phos >3 0    ID:   Bandemia  - etiology unclear  Trend temps and WBC count    Heme:   No issues  Daily INR  Trend hgb and plts  Transfuse as needed for goal hgb >8 in the face of active ischemia    Endo:   Hyperglycemia  - no history of diabetes  - possibly secondary to stress response  - we will check A1c, place on sliding scale insulin    Glycemic control plan: Blood glucose not controlled  Start SQ insulin  Weight based algorithm    Msk/Skin:  No issues  Will remove IO now the peripheral access obtained  Mobility goal:  Bed rest for now  PT consult:  When appropriate  OT consult:  When appropriate  Frequent turning and off-loading    Family:  Family updated within 24 hours: yes     Lines:  Peripheral IVs     VTE Prophylaxis:  Pharmacologic Prophylaxis: Reason for no pharmacologic prophylaxis Received Brilinta, Angiomax, Kengreal  Mechanical Prophylaxis: sequential compression device    Disposition: Admit to ICU    Code Status: Level 1 - Full Code    Counseling / Coordination of Care  Total Critical Care time spent 46 minutes excluding procedures, teaching and family updates  ______________________________________________________________________      Invasive lines and devices:   Invasive Devices     Peripheral Intravenous Line            Peripheral IV Right Antecubital -- days    Peripheral IV 11/22/18 Left Antecubital less than 1 day    Peripheral IV 11/22/18 Left Wrist less than 1 day          Intraosseous Line            Intraosseous Line Tibia -- days          Drain            Urethral Catheter Temperature probe less than 1 day          Airway            ETT  8 mm less than 1 day                Code Status: Level 1 - Full Code  POA:    POLST:      Given critical illness, patient length of stay will require greater than two midnights  Portions of the record may have been created with voice recognition software  Occasional wrong word or "sound a like" substitutions may have occurred due to the inherent limitations of voice recognition software  Read the chart carefully and recognize, using context, where substitutions have occurred        Lamont Estrella PA-C

## 2018-11-22 NOTE — ED PROVIDER NOTES
History  Chief Complaint   Patient presents with    Cardiac Arrest     witnessed arrest by family      53-year-old male presenting after cardiac arrest   Per family, patient complained that he did not feel right and collapsed at home  Patient was found pulseless by police and received 2 shocks at home before EMS arrived  While en route with EMS  , he was in V-tach and VFib on pulse checks  He received 6 doses of epi and was shocked a total of 6 times  Patient was given a bolus of 300 mg of amiodarone and started on Amiodarone drip at 1  He got ROSC prior to arrival   Patient was hypotensive and was given push dose Epi  Pre-hospital Accu-Chek in the 200s  Pt has R tibial IO and b/l upper extremity IVs  EMS placed a Samuel airway  Cardiology was notified of the pt prior to arrival  Patient was immediately evaluated on arrival, palpable pulses  Patient waking up and moving around  Pt was given Etomidate and Samuel airway was exchanged for an ETT  Cardiology immediately met patient at bedside and agreed to take patient to catheterization lab  Per family, pt with no known CAD or chronic medical problems  Is a smoker  Family was updated regarding care/plan            None       No past medical history on file  No past surgical history on file  No family history on file  I have reviewed and agree with the history as documented  Social History   Substance Use Topics    Smoking status: Not on file    Smokeless tobacco: Not on file    Alcohol use Not on file        Review of Systems   Unable to perform ROS: Intubated       Physical Exam  Physical Exam   Constitutional: He appears well-developed and well-nourished  He appears distressed  HENT:   Head: Normocephalic and atraumatic  Right Ear: External ear normal    Left Ear: External ear normal    Neck: Neck supple  No tracheal deviation present     Cardiovascular:   Tachycardic, regular   Pulmonary/Chest:   B/l breath sounds auscultated with bagging Abdominal: Soft  He exhibits no distension  Neurological:   Waking up, unable to follow commands, moving left side extremities spontaneously  Unable to do further neurologic exam    Skin: Skin is warm  He is diaphoretic  Vitals reviewed        Vital Signs  ED Triage Vitals   Temp Pulse Respirations Blood Pressure SpO2   -- 11/22/18 1222 11/22/18 1235 11/22/18 1225 11/22/18 1222    (!) 119 21 (!) 214/126 96 %      Temp src Heart Rate Source Patient Position - Orthostatic VS BP Location FiO2 (%)   -- 11/22/18 1222 -- -- --    Monitor         Pain Score       --                  Vitals:    11/22/18 1222 11/22/18 1225 11/22/18 1235   BP:  (!) 214/126    Pulse: (!) 119  (!) 120       Visual Acuity      ED Medications  Medications   amiodarone 150 mg/3 mL injection **ADS Override Pull** (not administered)   fentanyl citrate (PF) 100 MCG/2ML **ADS Override Pull** (not administered)   lidocaine (XYLOCAINE) 1 % injection (10 mL Infiltration Given 11/22/18 1236)   cangrelor tetrasodium (KENGREAL) bolus from bag (16 5 mcg Intravenous Given 11/22/18 1243)   cangrelor tetrasodium (KENGREAL) 50 mg in sodium chloride 0 9 % 250 mL infusion (132 mcg/min Intravenous New Bag 11/22/18 1243)   bivalirudin (ANGIOMAX) bolus from bag (16 5 mL Intravenous Given 11/22/18 1251)   Bivalirudin Trifluoroacetate (ANGIOMAX) 250 mg in sodium chloride 0 9 % 50 mL infusion (1 75 mg/kg/hr × 109 kg (Order-Specific) Intravenous New Bag 11/22/18 1303)   midazolam (VERSED) injection (1 mg Intravenous Given 11/22/18 1307)   nitroGLYcerin (TRIDIL) 50 mg in 250 mL infusion (premix) (0 mcg/min  Stopped 11/22/18 1324)   fentanyl citrate (PF) 100 MCG/2ML 100 mcg (100 mcg Intravenous Given 11/22/18 1227)   etomidate (AMIDATE) 2 mg/mL injection 30 mg (30 mg Intravenous Given 11/22/18 1225)       Diagnostic Studies  Results Reviewed     Procedure Component Value Units Date/Time    CBC and differential [[id number]]     Lab Status:  No result Specimen:  Blood Basic metabolic panel [[id number]]     Lab Status:  No result Specimen:  Blood     Protime-INR [[id number]]     Lab Status:  No result Specimen:  Blood     CBC and differential [[id number]]  (Abnormal) Collected:  11/22/18 1240    Lab Status:  Final result Specimen:  Blood from Arm, Left Updated:  11/22/18 1335     WBC 31 15 (HH) Thousand/uL      RBC 5 28 Million/uL      Hemoglobin 15 9 g/dL      Hematocrit 48 4 %      MCV 92 fL      MCH 30 1 pg      MCHC 32 9 g/dL      RDW 13 4 %      MPV 9 7 fL      Platelets 636 Thousands/uL      nRBC 0 /100 WBCs     Lipase [[id number]]  (Abnormal) Collected:  11/22/18 1240    Lab Status:  Final result Specimen:  Blood from Arm, Left Updated:  11/22/18 1307     Lipase 506 (H) u/L     Basic metabolic panel [[id number]]  (Abnormal) Collected:  11/22/18 1240    Lab Status:  Final result Specimen:  Blood from Arm, Left Updated:  11/22/18 1307     Sodium 139 mmol/L      Potassium 3 3 (L) mmol/L      Chloride 101 mmol/L      CO2 18 (L) mmol/L      ANION GAP 20 (H) mmol/L      BUN 20 mg/dL      Creatinine 1 57 (H) mg/dL      Glucose 323 (H) mg/dL      Calcium 9 1 mg/dL      eGFR 49 ml/min/1 73sq m     Narrative:         National Kidney Disease Education Program recommendations are as follows:  GFR calculation is accurate only with a steady state creatinine  Chronic Kidney disease less than 60 ml/min/1 73 sq  meters  Kidney failure less than 15 ml/min/1 73 sq  meters      Hepatic function panel [[id number]]  (Abnormal) Collected:  11/22/18 1240    Lab Status:  Final result Specimen:  Blood from Arm, Left Updated:  11/22/18 1307     Total Bilirubin 0 60 mg/dL      Bilirubin, Direct 0 21 (H) mg/dL      Alkaline Phosphatase 129 (H) U/L       (H) U/L       (H) U/L      Total Protein 8 1 g/dL      Albumin 4 2 g/dL     Troponin I [[id number]]  (Abnormal) Collected:  11/22/18 1240    Lab Status:  Final result Specimen:  Blood from Arm, Left Updated:  11/22/18 1307     Troponin I 1 11 (H) ng/mL     Magnesium [[id number]]  (Abnormal) Collected:  11/22/18 1240    Lab Status:  Final result Specimen:  Blood from Arm, Left Updated:  11/22/18 1307     Magnesium 3 3 (H) mg/dL     Phosphorus [[id number]]  (Abnormal) Collected:  11/22/18 1240    Lab Status:  Final result Specimen:  Blood from Arm, Left Updated:  11/22/18 1307     Phosphorus 7 2 (H) mg/dL     APTT [[id number]]  (Normal) Collected:  11/22/18 1240    Lab Status:  Final result Specimen:  Blood from Arm, Left Updated:  11/22/18 1302     PTT 38 seconds     Protime-INR [[id number]]  (Abnormal) Collected:  11/22/18 1240    Lab Status:  Final result Specimen:  Blood from Arm, Left Updated:  11/22/18 1302     Protime 14 8 (H) seconds      INR 1 17    POCT troponin [[id number]]  (Abnormal) Collected:  11/22/18 1236    Lab Status:  Final result Updated:  11/22/18 1249     POC Troponin I 0 54 (H) ng/ml      Specimen Type VENOUS    Narrative:         [employer] i-Stat handheld analyzer 99% cutoff is > 0 08ng/mL in [facility]    o cTnI 99% cutoff is useful only when applied to patients in the clinical setting of myocardial ischemia  o cTnI 99% cutoff should be interpreted in the context of clinical history, ECG findings and possibly cardiac imaging to establish correct diagnosis  o cTnI 99% cutoff may be suggestive but clearly not indicative of a coronary event without the clinical setting of myocardial ischemia      POCT Chem 8+ [[id number]]  (Abnormal) Collected:  11/22/18 1226    Lab Status:  Final result Updated:  11/22/18 1230     SODIUM, I-STAT 140 mmol/l      Potassium, i-STAT 3 4 (L) mmol/L      Chloride, istat 104 mmol/L      CO2, i-STAT 19 (L) mmol/L      Anion Gap, i-STAT 22 (H) mmol/L      Calcium, Ionized i-STAT 1 15 mmol/L      BUN, I-STAT 21 mg/dl      Creatinine, i-STAT 1 3 mg/dl      eGFR 57 ml/min/1 73sq m      Glucose, i-STAT 332 (H) mg/dl      Hct, i-STAT 49 %      Hgb, i-STAT 16 7 g/dl      Specimen Type VENOUS Fingerstick Glucose (POCT) [[id number]]  (Abnormal) Collected:  11/22/18 1222    Lab Status:  Final result Updated:  11/22/18 1224     POC Glucose 313 (H) mg/dl                  No orders to display              Procedures  Intubation  Date/Time: 11/22/2018 12:50 PM  Performed by: Ramon Segura  Authorized by: [facility]ier[facility]     Patient location:  ED  Consent:     Consent obtained:  Emergent situation  Pre-procedure details:     Patient status:  Altered mental status    Pretreatment medications:  Etomidate    Paralytics:  None  Indications:     Indications for intubation: respiratory distress    Procedure details:     Preoxygenation:  CAIN/LMA    CPR in progress: no      Intubation method:  Oral    Oral intubation technique:  Glidescope    Tube size (mm):  8 0    Tube type:  Cuffed    Number of attempts:  1    Tube visualized through cords: yes    Placement assessment:     ETT to lip:  23    Tube secured with:  ETT castañeda    Breath sounds:  Equal and absent over the epigastrium    Placement verification: chest rise, direct visualization, equal breath sounds, ETCO2 detector and capnography    Post-procedure details:     Patient tolerance of procedure:   Tolerated well, no immediate complications           Phone Contacts  ED Phone Contact    ED Course                               MDM  Number of Diagnoses or Management Options  Cardiac arrest [facility]):   STEMI (ST elevation myocardial infarction) ([facility] [address] ):   V-tach [facility]):   Diagnosis management comments: 67 yo M with witnessed cardiac arrest (Vfib/Vtach) s/p 3 rounds of CPR/epi/shocks and ROSC  ETT placed and pt sent emergently to cardiac cath       Amount and/or Complexity of Data Reviewed  Clinical lab tests: ordered and reviewed  Discuss the patient with other providers: yes (Cardiology, ICU)      CritCare Time    Disposition  Final diagnoses:   Cardiac arrest ([facility] [address] )   V-tach ([facility] )     Time reflects when diagnosis was documented in both MDM as applicable and the Disposition within this note     Time User Action Codes Description Comment    11/22/2018 12:42 PM Ruma Cam Add [I46 9] Cardiac arrest ([facility] [address] )     11/22/2018 12:42 PM Ruma Cam Add [I47 2] V-tach [facility])       ED Disposition     ED Disposition Condition Comment    Send to Cath Lab        Follow-up Information    None         Patient's Medications    No medications on file     No discharge procedures on file      ED Provider  Electronically Signed by           Jose Tariq DO  11/22/18 4690

## 2018-11-23 ENCOUNTER — APPOINTMENT (INPATIENT)
Dept: CT IMAGING | Facility: HOSPITAL | Age: 58
DRG: 246 | End: 2018-11-23
Payer: COMMERCIAL

## 2018-11-23 ENCOUNTER — APPOINTMENT (INPATIENT)
Dept: RADIOLOGY | Facility: HOSPITAL | Age: 58
DRG: 246 | End: 2018-11-23
Payer: COMMERCIAL

## 2018-11-23 ENCOUNTER — APPOINTMENT (INPATIENT)
Dept: NON INVASIVE DIAGNOSTICS | Facility: HOSPITAL | Age: 58
DRG: 246 | End: 2018-11-23
Payer: COMMERCIAL

## 2018-11-23 LAB
ALBUMIN SERPL BCP-MCNC: 3.2 G/DL (ref 3.5–5)
ALP SERPL-CCNC: 69 U/L (ref 46–116)
ALT SERPL W P-5'-P-CCNC: 254 U/L (ref 12–78)
ANION GAP SERPL CALCULATED.3IONS-SCNC: 8 MMOL/L (ref 4–13)
AST SERPL W P-5'-P-CCNC: 237 U/L (ref 5–45)
ATRIAL RATE: 119 BPM
ATRIAL RATE: 62 BPM
ATRIAL RATE: 63 BPM
ATRIAL RATE: 71 BPM
ATRIAL RATE: 90 BPM
BASE EXCESS BLDA CALC-SCNC: -4.7 MMOL/L
BASOPHILS # BLD AUTO: 0.02 THOUSANDS/ΜL (ref 0–0.1)
BASOPHILS NFR BLD AUTO: 0 % (ref 0–1)
BILIRUB SERPL-MCNC: 0.5 MG/DL (ref 0.2–1)
BUN SERPL-MCNC: 25 MG/DL (ref 5–25)
CALCIUM SERPL-MCNC: 8 MG/DL (ref 8.3–10.1)
CHLORIDE SERPL-SCNC: 106 MMOL/L (ref 100–108)
CO2 SERPL-SCNC: 22 MMOL/L (ref 21–32)
CREAT SERPL-MCNC: 1.01 MG/DL (ref 0.6–1.3)
EOSINOPHIL # BLD AUTO: 0.08 THOUSAND/ΜL (ref 0–0.61)
EOSINOPHIL NFR BLD AUTO: 1 % (ref 0–6)
ERYTHROCYTE [DISTWIDTH] IN BLOOD BY AUTOMATED COUNT: 13.6 % (ref 11.6–15.1)
EST. AVERAGE GLUCOSE BLD GHB EST-MCNC: 171 MG/DL
GFR SERPL CREATININE-BSD FRML MDRD: 83 ML/MIN/1.73SQ M
GLUCOSE SERPL-MCNC: 124 MG/DL (ref 65–140)
GLUCOSE SERPL-MCNC: 158 MG/DL (ref 65–140)
GLUCOSE SERPL-MCNC: 174 MG/DL (ref 65–140)
GLUCOSE SERPL-MCNC: 182 MG/DL (ref 65–140)
HBA1C MFR BLD: 7.6 % (ref 4.2–6.3)
HCO3 BLDA-SCNC: 18.9 MMOL/L (ref 22–28)
HCT VFR BLD AUTO: 37.2 % (ref 36.5–49.3)
HGB BLD-MCNC: 12.4 G/DL (ref 12–17)
IMM GRANULOCYTES # BLD AUTO: 0.07 THOUSAND/UL (ref 0–0.2)
IMM GRANULOCYTES NFR BLD AUTO: 1 % (ref 0–2)
LACTATE SERPL-SCNC: 1.7 MMOL/L (ref 0.5–2)
LACTATE SERPL-SCNC: 1.8 MMOL/L (ref 0.5–2)
LYMPHOCYTES # BLD AUTO: 1.07 THOUSANDS/ΜL (ref 0.6–4.47)
LYMPHOCYTES NFR BLD AUTO: 7 % (ref 14–44)
MAGNESIUM SERPL-MCNC: 2.3 MG/DL (ref 1.6–2.6)
MCH RBC QN AUTO: 29.7 PG (ref 26.8–34.3)
MCHC RBC AUTO-ENTMCNC: 33.3 G/DL (ref 31.4–37.4)
MCV RBC AUTO: 89 FL (ref 82–98)
MONOCYTES # BLD AUTO: 1.19 THOUSAND/ΜL (ref 0.17–1.22)
MONOCYTES NFR BLD AUTO: 8 % (ref 4–12)
NEUTROPHILS # BLD AUTO: 12.24 THOUSANDS/ΜL (ref 1.85–7.62)
NEUTS SEG NFR BLD AUTO: 83 % (ref 43–75)
NRBC BLD AUTO-RTO: 0 /100 WBCS
O2 CT BLDA-SCNC: 17.8 ML/DL (ref 16–23)
OXYHGB MFR BLDA: 97.9 % (ref 94–97)
P AXIS: 44 DEGREES
P AXIS: 45 DEGREES
P AXIS: 51 DEGREES
P AXIS: 53 DEGREES
P AXIS: 73 DEGREES
PCO2 BLDA: 30.8 MM HG (ref 36–44)
PH BLDA: 7.41 [PH] (ref 7.35–7.45)
PHOSPHATE SERPL-MCNC: 2.9 MG/DL (ref 2.7–4.5)
PLATELET # BLD AUTO: 232 THOUSANDS/UL (ref 149–390)
PMV BLD AUTO: 9.9 FL (ref 8.9–12.7)
PO2 BLDA: 134.7 MM HG (ref 75–129)
POTASSIUM SERPL-SCNC: 5.2 MMOL/L (ref 3.5–5.3)
PR INTERVAL: 158 MS
PR INTERVAL: 170 MS
PR INTERVAL: 170 MS
PR INTERVAL: 174 MS
PR INTERVAL: 176 MS
PROT SERPL-MCNC: 5.9 G/DL (ref 6.4–8.2)
QRS AXIS: 12 DEGREES
QRS AXIS: 44 DEGREES
QRS AXIS: 48 DEGREES
QRS AXIS: 49 DEGREES
QRS AXIS: 66 DEGREES
QRSD INTERVAL: 100 MS
QRSD INTERVAL: 102 MS
QRSD INTERVAL: 102 MS
QRSD INTERVAL: 104 MS
QRSD INTERVAL: 104 MS
QT INTERVAL: 306 MS
QT INTERVAL: 422 MS
QT INTERVAL: 438 MS
QT INTERVAL: 492 MS
QT INTERVAL: 522 MS
QTC INTERVAL: 430 MS
QTC INTERVAL: 448 MS
QTC INTERVAL: 516 MS
QTC INTERVAL: 529 MS
QTC INTERVAL: 534 MS
RBC # BLD AUTO: 4.17 MILLION/UL (ref 3.88–5.62)
SODIUM SERPL-SCNC: 136 MMOL/L (ref 136–145)
SPECIMEN SOURCE: ABNORMAL
T WAVE AXIS: 154 DEGREES
T WAVE AXIS: 241 DEGREES
T WAVE AXIS: 243 DEGREES
T WAVE AXIS: 51 DEGREES
T WAVE AXIS: 85 DEGREES
TROPONIN I SERPL-MCNC: 27.92 NG/ML
TROPONIN I SERPL-MCNC: 34.45 NG/ML
TROPONIN I SERPL-MCNC: 34.56 NG/ML
VENTRICULAR RATE: 119 BPM
VENTRICULAR RATE: 62 BPM
VENTRICULAR RATE: 63 BPM
VENTRICULAR RATE: 71 BPM
VENTRICULAR RATE: 90 BPM
WBC # BLD AUTO: 14.67 THOUSAND/UL (ref 4.31–10.16)

## 2018-11-23 PROCEDURE — 85025 COMPLETE CBC W/AUTO DIFF WBC: CPT | Performed by: PHYSICIAN ASSISTANT

## 2018-11-23 PROCEDURE — 93010 ELECTROCARDIOGRAM REPORT: CPT | Performed by: INTERNAL MEDICINE

## 2018-11-23 PROCEDURE — 83735 ASSAY OF MAGNESIUM: CPT | Performed by: PHYSICIAN ASSISTANT

## 2018-11-23 PROCEDURE — 71045 X-RAY EXAM CHEST 1 VIEW: CPT

## 2018-11-23 PROCEDURE — 84484 ASSAY OF TROPONIN QUANT: CPT | Performed by: PHYSICIAN ASSISTANT

## 2018-11-23 PROCEDURE — 94003 VENT MGMT INPAT SUBQ DAY: CPT

## 2018-11-23 PROCEDURE — 82805 BLOOD GASES W/O2 SATURATION: CPT | Performed by: PHYSICIAN ASSISTANT

## 2018-11-23 PROCEDURE — 94760 N-INVAS EAR/PLS OXIMETRY 1: CPT

## 2018-11-23 PROCEDURE — 70450 CT HEAD/BRAIN W/O DYE: CPT

## 2018-11-23 PROCEDURE — 99233 SBSQ HOSP IP/OBS HIGH 50: CPT | Performed by: PHYSICIAN ASSISTANT

## 2018-11-23 PROCEDURE — 82948 REAGENT STRIP/BLOOD GLUCOSE: CPT

## 2018-11-23 PROCEDURE — 84100 ASSAY OF PHOSPHORUS: CPT | Performed by: PHYSICIAN ASSISTANT

## 2018-11-23 PROCEDURE — 93005 ELECTROCARDIOGRAM TRACING: CPT

## 2018-11-23 PROCEDURE — 83605 ASSAY OF LACTIC ACID: CPT | Performed by: PHYSICIAN ASSISTANT

## 2018-11-23 PROCEDURE — 80053 COMPREHEN METABOLIC PANEL: CPT | Performed by: PHYSICIAN ASSISTANT

## 2018-11-23 PROCEDURE — 93306 TTE W/DOPPLER COMPLETE: CPT

## 2018-11-23 PROCEDURE — 83036 HEMOGLOBIN GLYCOSYLATED A1C: CPT | Performed by: PHYSICIAN ASSISTANT

## 2018-11-23 RX ORDER — FENTANYL CITRATE 50 UG/ML
50 INJECTION, SOLUTION INTRAMUSCULAR; INTRAVENOUS ONCE
Status: COMPLETED | OUTPATIENT
Start: 2018-11-23 | End: 2018-11-23

## 2018-11-23 RX ORDER — NOREPINEPHRINE BITARTRATE 1 MG/ML
INJECTION, SOLUTION INTRAVENOUS
Status: COMPLETED
Start: 2018-11-23 | End: 2018-11-23

## 2018-11-23 RX ORDER — MAGNESIUM SULFATE HEPTAHYDRATE 40 MG/ML
2 INJECTION, SOLUTION INTRAVENOUS ONCE
Status: COMPLETED | OUTPATIENT
Start: 2018-11-23 | End: 2018-11-23

## 2018-11-23 RX ORDER — ACETAMINOPHEN 160 MG/5ML
650 SUSPENSION, ORAL (FINAL DOSE FORM) ORAL EVERY 6 HOURS SCHEDULED
Status: DISCONTINUED | OUTPATIENT
Start: 2018-11-23 | End: 2018-11-27

## 2018-11-23 RX ORDER — FENTANYL CITRATE 50 UG/ML
INJECTION, SOLUTION INTRAMUSCULAR; INTRAVENOUS
Status: COMPLETED
Start: 2018-11-23 | End: 2018-11-23

## 2018-11-23 RX ORDER — HEPARIN SODIUM 5000 [USP'U]/ML
5000 INJECTION, SOLUTION INTRAVENOUS; SUBCUTANEOUS EVERY 8 HOURS SCHEDULED
Status: DISCONTINUED | OUTPATIENT
Start: 2018-11-23 | End: 2018-12-02 | Stop reason: HOSPADM

## 2018-11-23 RX ORDER — FENTANYL CITRATE 50 UG/ML
50 INJECTION, SOLUTION INTRAMUSCULAR; INTRAVENOUS ONCE
Status: DISCONTINUED | OUTPATIENT
Start: 2018-11-23 | End: 2018-11-27

## 2018-11-23 RX ORDER — LIDOCAINE 50 MG/G
2 PATCH TOPICAL DAILY
Status: DISCONTINUED | OUTPATIENT
Start: 2018-11-23 | End: 2018-12-02 | Stop reason: HOSPADM

## 2018-11-23 RX ADMIN — INSULIN LISPRO 1 UNITS: 100 INJECTION, SOLUTION INTRAVENOUS; SUBCUTANEOUS at 00:20

## 2018-11-23 RX ADMIN — HEPARIN SODIUM 5000 UNITS: 5000 INJECTION, SOLUTION INTRAVENOUS; SUBCUTANEOUS at 06:41

## 2018-11-23 RX ADMIN — PERFLUTREN 0.6 ML/MIN: 6.52 INJECTION, SUSPENSION INTRAVENOUS at 11:08

## 2018-11-23 RX ADMIN — LIDOCAINE 2 PATCH: 50 PATCH CUTANEOUS at 06:42

## 2018-11-23 RX ADMIN — DEXMEDETOMIDINE HYDROCHLORIDE 0.7 MCG/KG/HR: 100 INJECTION, SOLUTION INTRAVENOUS at 08:25

## 2018-11-23 RX ADMIN — NOREPINEPHRINE BITARTRATE 4000 MCG: 1 INJECTION INTRAVENOUS at 04:30

## 2018-11-23 RX ADMIN — INSULIN LISPRO 1 UNITS: 100 INJECTION, SOLUTION INTRAVENOUS; SUBCUTANEOUS at 06:05

## 2018-11-23 RX ADMIN — HEPARIN SODIUM 5000 UNITS: 5000 INJECTION, SOLUTION INTRAVENOUS; SUBCUTANEOUS at 22:02

## 2018-11-23 RX ADMIN — INSULIN LISPRO 1 UNITS: 100 INJECTION, SOLUTION INTRAVENOUS; SUBCUTANEOUS at 14:59

## 2018-11-23 RX ADMIN — SODIUM CHLORIDE, SODIUM GLUCONATE, SODIUM ACETATE, POTASSIUM CHLORIDE AND MAGNESIUM CHLORIDE 75 ML/HR: 526; 502; 368; 37; 30 INJECTION, SOLUTION INTRAVENOUS at 21:04

## 2018-11-23 RX ADMIN — HEPARIN SODIUM 5000 UNITS: 5000 INJECTION, SOLUTION INTRAVENOUS; SUBCUTANEOUS at 14:58

## 2018-11-23 RX ADMIN — SODIUM CHLORIDE 250 ML: 0.9 INJECTION, SOLUTION INTRAVENOUS at 03:49

## 2018-11-23 RX ADMIN — FENTANYL CITRATE 100 MCG: 50 INJECTION, SOLUTION INTRAMUSCULAR; INTRAVENOUS at 05:32

## 2018-11-23 RX ADMIN — CHLORHEXIDINE GLUCONATE 0.12% ORAL RINSE 15 ML: 1.2 LIQUID ORAL at 22:03

## 2018-11-23 RX ADMIN — ACETAMINOPHEN 650 MG: 160 SUSPENSION ORAL at 06:50

## 2018-11-23 RX ADMIN — DEXMEDETOMIDINE HYDROCHLORIDE 0.7 MCG/KG/HR: 100 INJECTION, SOLUTION INTRAVENOUS at 05:18

## 2018-11-23 RX ADMIN — ACETAMINOPHEN 325 MG: 325 TABLET, FILM COATED ORAL at 01:09

## 2018-11-23 RX ADMIN — TICAGRELOR 90 MG: 90 TABLET ORAL at 08:36

## 2018-11-23 RX ADMIN — DEXMEDETOMIDINE HYDROCHLORIDE 0.5 MCG/KG/HR: 100 INJECTION, SOLUTION INTRAVENOUS at 02:06

## 2018-11-23 RX ADMIN — CHLORHEXIDINE GLUCONATE 0.12% ORAL RINSE 15 ML: 1.2 LIQUID ORAL at 08:37

## 2018-11-23 RX ADMIN — BACITRACIN ZINC 1 LARGE APPLICATION: 500 OINTMENT TOPICAL at 18:08

## 2018-11-23 RX ADMIN — DEXMEDETOMIDINE HYDROCHLORIDE 1.2 MCG/KG/HR: 100 INJECTION, SOLUTION INTRAVENOUS at 14:20

## 2018-11-23 RX ADMIN — FENTANYL CITRATE 100 MCG: 50 INJECTION INTRAMUSCULAR; INTRAVENOUS at 05:32

## 2018-11-23 RX ADMIN — DEXMEDETOMIDINE HYDROCHLORIDE 0.2 MCG/KG/HR: 100 INJECTION, SOLUTION INTRAVENOUS at 22:33

## 2018-11-23 RX ADMIN — ASPIRIN 81 MG 81 MG: 81 TABLET ORAL at 08:35

## 2018-11-23 RX ADMIN — MAGNESIUM SULFATE HEPTAHYDRATE 2 G: 40 INJECTION, SOLUTION INTRAVENOUS at 10:11

## 2018-11-23 RX ADMIN — DEXMEDETOMIDINE HYDROCHLORIDE 0.4 MCG/KG/HR: 100 INJECTION, SOLUTION INTRAVENOUS at 12:54

## 2018-11-23 RX ADMIN — BACITRACIN ZINC 1 LARGE APPLICATION: 500 OINTMENT TOPICAL at 08:36

## 2018-11-23 RX ADMIN — NICOTINE 14 MG: 14 PATCH TRANSDERMAL at 08:40

## 2018-11-23 RX ADMIN — NOREPINEPHRINE BITARTRATE 2 MCG/MIN: 1 INJECTION INTRAVENOUS at 07:35

## 2018-11-23 RX ADMIN — NOREPINEPHRINE BITARTRATE 4 MCG/MIN: 1 INJECTION INTRAVENOUS at 04:30

## 2018-11-23 RX ADMIN — DEXMEDETOMIDINE HYDROCHLORIDE 0.2 MCG/KG/HR: 100 INJECTION, SOLUTION INTRAVENOUS at 20:31

## 2018-11-23 NOTE — PROGRESS NOTES
Progress Note - Critical Care   Billy Zhang 66 y o  male MRN: [id number]  Unit/Bed#:  Encounter: [id number]    Assessment:   Principal Problem:    Ventricular fibrillation (HCC)  Active Problems:    Anxiety    STEMI (ST elevation myocardial infarction) ([facility] [address] )    CAD (coronary artery disease)    Transaminitis    Bandemia    STEVIE (acute kidney injury) ([facility] [address] )  Resolved Problems:    * No resolved hospital problems  *    Plan:   Neuro:   · Sedation:  Precedex gtt to achieve goal RASS 0 to -2  Sedation holiday with frequent neuro checks  · [place]:  Consider low-dose fentanyl infusion  Lidoderm patches to anterior chest wall  · Delirium precautions  Regulate sleep-wake cycles  Daily CAM ICU  CV:   · Maintain maps greater than 65%  Continue vasopressor to support hemodynamics  · STEMI/VFib arrest s/p PCI w/CRUZ to LAD:  Continue dual platelet therapy ASA/Brilinta  High-dose statin therapy  Initiate BB/ACE-inhibitor as blood pressures tolerate  Echocardiogram pending  Trend troponins  Daily EKG  Lung:   · Acute hypoxic respiratory failure:  Continue mechanical ventilation lung protective volumes  Wean as tolerated  · Daily spontaneous breathing trials  · Encourage good pulmonary toilet pulmonary toileting  · VAP Bundle  GI:   · Transaminitis secondary to shock liver-improving  · ppx:senna  FEN:   · Gentle IV fluid hydration  · Monitor electrolytes  Replete as warranted  Mg> 2 0, K > 4 0  · If unable to extubate today  Will initiate tube feeding  :   · A KI-improving  · Continue gentle IV fluid hydration  · Maintain Ramey for Strict I&Os  · Trend BUN/creatinine  ID:   · No obvious source of infectious etiology  · Follow-up blood cultures  · Monitor fever curve and WBC count  Heme:   · H/H and platelets stable  Continue trend monitor  · ppx vte sqh  Endo:   · Hyperglycemia; hemoglobin A1c pending  Will initiate sliding scale insulin coverage    Msk/Skin:   · Frequent offloading repositioning to avoid skin breakdown  · PTOT when applicable  Disposition:  · Continue ICU level care     ______________________________________________________________________  Chief Complaint:  Intubated unable to voice chief complaint  HPI/24hr events:   Patient presented to ED via EMS status post VFib arrest   [place] chief enroute  Patient was taken straight to the cath lab and underwent cardiac catheterization with Dr Katia Lala  CRUZ was placed to LAD  Patient was found to have metabolic derangement remained intubated overnight  Throughout the night patient borderline hypotensive received 500 cc crystalloid without improvement of blood pressure  Arterial line was placed for more accurate hemodynamic monitoring  Low-dose peripheral Levophed was started to support BP     ______________________________________________________________________  Physical Exam:  [place] Agitation Sedation Scale (RASS): Light sedation  Physical Exam   Constitutional: No distress  HENT:   Head: Normocephalic and atraumatic  Eyes: Pupils are equal, round, and reactive to light  Neck: Normal range of motion  Neck supple  Cardiovascular: Regular rhythm and normal heart sounds  No murmur heard  Pulmonary/Chest: Effort normal and breath sounds normal  No respiratory distress  He has no wheezes  He has no rales  Abdominal: Soft  Bowel sounds are normal  He exhibits no distension  There is no tenderness  There is no rebound  Musculoskeletal: Normal range of motion  He exhibits no edema  Neurological:    Off of sedation, extremely agitated  Will move all extremities with purpose  Attempts to reach towards tube  Will not follow commands  Makes good eye contact when able to gain attention     Skin: Skin is warm      ______________________________________________________________________  Temperature:   Temp (24hrs), Av 5 °F (36 4 °C), Min:95 °F (35 °C), Max:99 3 °F (37 4 °C)    Current Temperature: 99 1 °F (37 3 °C)    Vitals:    11/22/18 2329 11/23/18 0000 11/23/18 0100 11/23/18 0200   BP:  90/55 119/77 91/61   BP Location:       Pulse: 83 81 68 80   Resp: 22 18 18   Temp:  99 3 °F (37 4 °C) 99 3 °F (37 4 °C) 99 1 °F (37 3 °C)   TempSrc:  Probe  Probe   SpO2: 98% 98% 94% 96%   Weight:       Height:                  Weights:   IBW: 77 6 kg    Body mass index is 29 63 kg/m²  Weight (last 2 days)     Date/Time   Weight    11/22/18 1521  99 1 (218 48)            Height: 6' (182 9 cm)    Lab Results   Component Value Date    PHART 7 321 (L) 11/22/2018    QER0SBP 38 3 11/22/2018    PO2ART 272 6 (H) 11/22/2018    WUI5HNM 19 3 (L) 11/22/2018    BEART -6 1 11/22/2018    SOURCE Radial, Right 11/22/2018     Intake and Outputs:  I/O       11/21 0701 - 11/22 0700 11/22 0701 - 11/23 0700    I V  (mL/kg)  711 8 (7 2)    NG/GT  80    IV Piggyback  250    Total Intake(mL/kg)  1041 8 (10 5)    Urine (mL/kg/hr)  1495    Emesis/NG output  800    Total Output   2295    Net   -1253 2              Nutrition:        Diet Orders            Start     Ordered    11/22/18 1451  Diet NPO  Diet effective now     Question Answer Comment   Diet Type NPO    RD to adjust diet per protocol?  Yes        11/22/18 1450        Labs:     Results from last 7 days  Lab Units 11/22/18  1516 11/22/18  1240 11/22/18  1226   WBC Thousand/uL 23 53* 31 15*  --    HEMOGLOBIN g/dL 14 6 15 9  --    I STAT HEMOGLOBIN g/dl  --   --  16 7   HEMATOCRIT % 44 6 48 4  --    HEMATOCRIT, ISTAT %  --   --  49   PLATELETS Thousands/uL 323 369  --    NEUTROS PCT % 89*  --   --    MONOS PCT % 6  --   --    MONO PCT %  --  3*  --       Results from last 7 days  Lab Units 11/22/18  1514 11/22/18  1240 11/22/18  1226   POTASSIUM mmol/L 3 8 3 3*  --    CHLORIDE mmol/L 102 101  --    CO2 mmol/L 22 18*  --    CO2, I-STAT mmol/L  --   --  19*   BUN mg/dL 19 20  --    CREATININE mg/dL 1 15 1 57*  --    CALCIUM mg/dL 8 8 9 1  --    ALK PHOS U/L 101 129*  --    ALT U/L 374* 428*  -- AST U/L 372* 416*  --    GLUCOSE, ISTAT mg/dl  --   --  332*       Results from last 7 days  Lab Units 11/22/18  1514 11/22/18  1240   MAGNESIUM mg/dL 2 6 3 3*       Results from last 7 days  Lab Units 11/22/18  1514 11/22/18  1240   PHOSPHORUS mg/dL 4 2 7 2*        Results from last 7 days  Lab Units 11/22/18  1811 11/22/18  1514 11/22/18  1240   INR  1 36* 2 09* 1 17   PTT seconds  --  84* 38       Results from last 7 days  Lab Units 11/23/18  0018   LACTIC ACID mmol/L 1 8       0  Lab Value Date/Time   TROPONINI 34 45 (H) 11/23/2018 0018   TROPONINI 33 74 (H) 11/22/2018 2019   TROPONINI 10 63 (H) 11/22/2018 1609   TROPONINI 5 20 (H) 11/22/2018 1514   TROPONINI 1 11 (H) 11/22/2018 1240     Imaging:  I have personally reviewed pertinent reports  and I have personally reviewed pertinent films in PACS  EKG:   Micro:  Blood Culture: No results found for: BLOODCX  Urine Culture: No results found for: URINECX  Sputum Culture: No components found for: SPUTUMCX  Wound Culure: No results found for: WOUNDCULT    No results found for: IRMA Siddiqui  Allergies:    Allergies   Allergen Reactions    Barley Grass Throat Swelling     Medications:   Scheduled Meds:  Current Facility-Administered Medications:  acetaminophen 325 mg Oral Q6H PRN Alexa Ang PA-C    aspirin 81 mg Oral Daily Alexa Ang PA-C    atorvastatin 40 mg Oral Daily With Gunner Chris PA-C    bacitracin 1 large application Topical BID Alexa Ang PA-C    chlorhexidine 15 mL Swish & Spit Q12H [address] Marylu Pelletier    dexmedetomidine 0 1-0 7 mcg/kg/hr Intravenous Titrated Renata Isbell PA-C Last Rate: 0 7 mcg/kg/hr (11/23/18 0309)   insulin lispro 1-6 Units Subcutaneous [facility] Alexa Ang PA-C    lidocaine 1 patch Topical Daily Alexa Ang PA-C    metoprolol tartrate 25 mg Oral Q12H [address] Marylu Pelletier    multi-electrolyte 75 mL/hr Intravenous Continuous Marylu Pelletier Last Rate: 75 mL/hr (11/22/18 1742)   nicotine 14 mg Transdermal Daily Sahra Cea, PA-C    propofol 5-50 mcg/kg/min Intravenous Titrated Sahra Cea, PA-C Last Rate: 10 mcg/kg/min (11/23/18 0309)   ticagrelor 90 mg Oral Q12H [address] Steve Esquivel MD      Continuous Infusions:  dexmedetomidine 0 1-0 7 mcg/kg/hr Last Rate: 0 7 mcg/kg/hr (11/23/18 0309)   multi-electrolyte 75 mL/hr Last Rate: 75 mL/hr (11/22/18 1742)   propofol 5-50 mcg/kg/min Last Rate: 10 mcg/kg/min (11/23/18 0309)     PRN Meds:    acetaminophen 325 mg Q6H PRN     VTE Pharmacologic Prophylaxis: Heparin  VTE Mechanical Prophylaxis: sequential compression device  Invasive lines and devices: Invasive Devices     Peripheral Intravenous Line            Peripheral IV 11/22/18 Left Antecubital less than 1 day    Peripheral IV 11/22/18 Left Wrist less than 1 day    Peripheral IV 11/22/18 Right Forearm less than 1 day          Intraosseous Line            Intraosseous Line Tibia -- days          Drain            NG/OG/Enteral Tube Orogastric 14 Fr less than 1 day    Urethral Catheter Temperature probe less than 1 day          Airway            ETT  8 mm less than 1 day                   Counseling / Coordination of Care  Total Critical Care time spent 45 minutes excluding procedures, teaching and family updates  Code Status: Level 1 - Full Code    Portions of the record may have been created with voice recognition software  Occasional wrong word or "sound a like" substitutions may have occurred due to the inherent limitations of voice recognition software  Read the chart carefully and recognize, using context, where substitutions have occurred      Kalpesh Mejia PA-C

## 2018-11-23 NOTE — SOCIAL WORK
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 that has caused syncopal attacks in the past   He is on medication for panic attacks that have started since these severe allergic rxns  Denies substance abuse  He is a smoker but goes outside to smoke so family is unsure how much he actually smokes  Dr iWllie Chang is his PCP  He does not have a POA or Advanced Directive and does not want info at this time  Pt uses [facility] in [facility] and has no problem with his co-pays  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  His wife will transport home when he is medically cleared  CM discussed d/c needs including HH services, but wife does not feel this will be needed  CM department will follow through hospitalization  CM reviewed discharge planning process including the following: identifying help at home, patient preference for discharge planning needs, pharmacy preference, and availability of treatment team to discuss questions or concerns patient and/or family may have regarding understanding medications and recognizing signs and symptoms once discharged  CM also encouraged patient to follow up with all recommended appointments after discharge  Patient advised of importance for patient and family to participate in managing patients medical well being  CM name and role reviewed  Discharge Checklist reviewed and CM will continue to monitor for progress toward discharge goals in nursing and provider rounds

## 2018-11-23 NOTE — RESPIRATORY THERAPY NOTE
RT Ventilator Management Note  Billy Zhang 66 y o  male MRN: [id number]  Unit/Bed#:  Encounter: [id number]      Daily Screen       11/23/2018 0933 11/23/2018 0941          Spont breathing trial % for 30 min: Yes -      Spont breathing trial outcome[de-identified] Passed -      Name of Medical Team Notified[de-identified] Mere Weldon -      Preparing to extubate/ Notify Nurse: Yes -      Extubation order obtained: Yes -      Patient extubated: - Yes      RSBI: 45 -              Physical Exam:   Assessment Type: Assess only  General Appearance: Sedated  Respiratory Pattern: Normal, Assisted  Chest Assessment: Chest expansion symmetrical  Bilateral Breath Sounds: Diminished, Scattered, Coarse  Cough: None  O2 Device: mechanical ventilation  Subjective Data: pateint with periods of restklessness and agitatuion not fiollowing commnads  Resp Comments: Pt extubated at this time and placed on 4L nasal cannula  No stridor heard after extubation  Ventilator on standby at bedside

## 2018-11-23 NOTE — PLAN OF CARE
Problem: DISCHARGE PLANNING - CARE MANAGEMENT  Goal: Discharge to post-acute care or home with appropriate resources  INTERVENTIONS:  - Conduct assessment to determine patient/family and health care team treatment goals, and need for post-acute services based on payer coverage, community resources, and patient preferences, and barriers to discharge  - Address psychosocial, clinical, and financial barriers to discharge as identified in assessment in conjunction with the patient/family and health care team  - Arrange appropriate level of post-acute services according to patients   needs and preference and payer coverage in collaboration with the physician and health care team  - Communicate with and update the patient/family, physician, and health care team regarding progress on the discharge plan  - Arrange appropriate transportation to post-acute venues  Outcome: Progressing  CM is extubated, but is sleeping  Hx obtained from wife Ganga Murray and mother Grey Stevenson at bedside  Pt lives with his wife, son, and mother in law in a 2nd floor apt with 10 steps w/railing to reach  He is able to navigate steps and is independent with ADL's  He uses no DME's  He has a strong allergy to barley that has caused syncopal attacks in the past   He is on medication for panic attacks that have started since these severe allergic rxns  Denies substance abuse  He is a smoker but goes outside to smoke so family is unsure how much he actually smokes  Dr Meaghan Salvador is his PCP  He does not have a POA or Advanced Directive and does not want info at this time  Pt uses [facility] in [employer] and has no problem with his co-pays  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  His wife will transport home when he is medically cleared  CM discussed d/c needs including HH services, but wife does not feel this will be needed  CM department will follow through hospitalization      CM reviewed discharge planning process including the following: identifying help at home, patient preference for discharge planning needs, pharmacy preference, and availability of treatment team to discuss questions or concerns patient and/or family may have regarding understanding medications and recognizing signs and symptoms once discharged  CM also encouraged patient to follow up with all recommended appointments after discharge  Patient advised of importance for patient and family to participate in managing patients medical well being  CM name and role reviewed  Discharge Checklist reviewed and CM will continue to monitor for progress toward discharge goals in nursing and provider rounds

## 2018-11-23 NOTE — PROGRESS NOTES
Progress Note - Cardiology   Billy Zhang 66 y o  male MRN: [id number]  Unit/Bed#:  Encounter: [id number]    Assessment:  1  S/P Vfib cardiac arrest  2  S/P STEMI  3  S/P CRUZ to LAD    Plan:  1  Check echocardiogram  2  Vent weaning    Subjective/Objective   Chief Complaint: On Vent    Subjective: Sedated    Objective: On vent    Vitals: /73   Pulse 63   Temp 98 4 °F (36 9 °C)   Resp (!) 27   Ht 6' (1 829 m)   Wt 99 2 kg (218 lb 11 1 oz)   SpO2 100%   BMI 29 66 kg/m²   Vitals:    11/22/18 1521 11/23/18 0600   Weight: 99 1 kg (218 lb 7 6 oz) 99 2 kg (218 lb 11 1 oz)     Orthostatic Blood Pressures      Most Recent Value   Blood Pressure  107/73 filed at 11/23/2018 0700   Patient Position - Orthostatic VS  Lying filed at 11/23/2018 0600            Intake/Output Summary (Last 24 hours) at 11/23/18 0026  Last data filed at 11/23/18 0600   Gross per 24 hour   Intake          2003 91 ml   Output             2695 ml   Net          -691 09 ml       Invasive Devices     Peripheral Intravenous Line            Peripheral IV 11/22/18 Left Antecubital less than 1 day    Peripheral IV 11/22/18 Left Wrist less than 1 day    Peripheral IV 11/22/18 Right Forearm less than 1 day          Intraosseous Line            Intraosseous Line Tibia -- days          Drain            NG/OG/Enteral Tube Orogastric 14 Fr less than 1 day    Urethral Catheter Temperature probe less than 1 day          Airway            ETT  8 mm less than 1 day                Review of Systems: Unobtainable    Physical Exam:   Cardiovascular: Normal rate, regular rhythm and normal heart sounds  No murmur heard    Pulmonary/Chest: Breath sounds normal      Lab Results:   CBC with diff:   Results from last 7 days  Lab Units 11/23/18  0449   WBC Thousand/uL 14 67*   RBC Million/uL 4 17   HEMOGLOBIN g/dL 12 4   HEMATOCRIT % 37 2   MCV fL 89   MCH pg 29 7   MCHC g/dL 33 3   RDW % 13 6   MPV fL 9 9   PLATELETS Thousands/uL 232     CMP: Results from last 7 days  Lab Units 11/23/18  0449  11/22/18  1226   SODIUM mmol/L 136  < >  --    POTASSIUM mmol/L 5 2  < >  --    CHLORIDE mmol/L 106  < >  --    CO2 mmol/L 22  < >  --    CO2, I-STAT mmol/L  --   --  19*   BUN mg/dL 25  < >  --    CREATININE mg/dL 1 01  < >  --    GLUCOSE, ISTAT mg/dl  --   --  332*   CALCIUM mg/dL 8 0*  < >  --    AST U/L 237*  < >  --    ALT U/L 254*  < >  --    ALK PHOS U/L 69  < >  --    EGFR ml/min/1 73sq m 83  < > 57   < > = values in this interval not displayed  Troponin:   0  Lab Value Date/Time   TROPONINI 34 56 (H) 11/23/2018 0449   TROPONINI 34 45 (H) 11/23/2018 0018   TROPONINI 33 74 (H) 11/22/2018 2019   TROPONINI 10 63 (H) 11/22/2018 1609   TROPONINI 5 20 (H) 11/22/2018 1514   TROPONINI 1 11 (H) 11/22/2018 1240     BNP:   Results from last 7 days  Lab Units 11/23/18  0449  11/22/18  1226   POTASSIUM mmol/L 5 2  < >  --    CHLORIDE mmol/L 106  < >  --    CO2 mmol/L 22  < >  --    CO2, I-STAT mmol/L  --   --  19*   BUN mg/dL 25  < >  --    CREATININE mg/dL 1 01  < >  --    GLUCOSE, ISTAT mg/dl  --   --  332*   CALCIUM mg/dL 8 0*  < >  --    EGFR ml/min/1 73sq m 83  < > 57   < > = values in this interval not displayed  Coags:   Results from last 7 days  Lab Units 11/22/18  1811 11/22/18  1514   PTT seconds  --  84*   INR  1 36* 2 09*     TSH:   Results from last 7 days  Lab Units 11/22/18  1514   TSH 3RD GENERATON uIU/mL 5 993*     Magnesium:   Results from last 7 days  Lab Units 11/23/18  0449   MAGNESIUM mg/dL 2 3     Lipid Profile:   Results from last 7 days  Lab Units 11/22/18  1514   HDL mg/dL 37*   LDL CALC mg/dL 81   TRIGLYCERIDES mg/dL 117     Imaging: I have personally reviewed pertinent reports  EKG:   VTE Pharmacologic Prophylaxis:   VTE Mechanical Prophylaxis:     Counseling / Coordination of Care  Total time spent today 30 minutes  Greater than 50% of total time was spent with the patient and / or family counseling and / or coordination of care   A description of the counseling / coordination of care: 30

## 2018-11-23 NOTE — RESPIRATORY THERAPY NOTE
RT Ventilator Management Note  Billy Zhang 66 y o  male MRN: [id number]  Unit/Bed#:  Encounter: [id number]      Daily Screen       11/23/2018 0826 11/23/2018 0835          Patient safety screen outcome[de-identified] Passed -      Spont breathing trial % for 30 min: - Yes              Physical Exam:   Assessment Type: Assess only  General Appearance: Sedated  Respiratory Pattern: Normal, Assisted  Chest Assessment: Chest expansion symmetrical  Bilateral Breath Sounds: Diminished, Scattered, Coarse  Cough: None  O2 Device: mechanical ventilation  Subjective Data: pateint with periods of restklessness and agitatuion not fiollowing commnads  Resp Comments: Pt placed on CPAP/PS 5/5 at this time  Pt tolerating CPAP/PS well; will continue to monitor pt  Plan: continue current support until further orders

## 2018-11-23 NOTE — RESPIRATORY THERAPY NOTE
RT Ventilator Management Note  Billy Zhang 66 y o  male MRN: [id number]  Unit/Bed#:  Encounter: [id number]      Daily Screen       11/22/2018 1706 11/22/2018 7379          Patient safety screen outcome[de-identified] Failed Passed      Not Ready for Weaning due to[de-identified] FiO2 >60%; Underline problem not resolved Underline problem not resolved              Physical Exam:   Assessment Type: Assess only  General Appearance: Eyes open/responds to stimulus, Drowsy  Respiratory Pattern: Normal, Assisted  Chest Assessment: Chest expansion symmetrical  Bilateral Breath Sounds: Clear, Diminished, Coarse, Scattered (faint scattered coarseness)  Cough: None  O2 Device: mechanical ventilation  Subjective Data: pateint with periods of restklessness and agitatuion not fiollowing commnads  Resp Comments: patient remains intubated on full mechanical ventilation pateitn is LOS# 1 s/p ventricular fibrillation requirint itnaubtaiona nd emchanical ventilationt o mainatain adeqauet ventilationa dn oxygenation,  pateint opens eyes and and becomes restlerss and agiatated wihtou follwoing commands  plan: contineu current support until further orders

## 2018-11-23 NOTE — RESPIRATORY THERAPY NOTE
RT Ventilator Management Note  Billy Zhang 66 y o  male MRN: [id number]  Unit/Bed#:  Encounter: [id number]      Daily Screen       11/22/2018 2329 11/23/2018 0826          Patient safety screen outcome[de-identified] Passed Passed      Not Ready for Weaning due to[de-identified] Underline problem not resolved -              Physical Exam:   Assessment Type: Assess only  General Appearance: Sedated  Respiratory Pattern: Normal, Assisted  Chest Assessment: Chest expansion symmetrical  Bilateral Breath Sounds: Diminished, Scattered, Coarse  Cough: None  O2 Device: mechanical ventilation  Subjective Data: pateint with periods of restklessness and agitatuion not fiollowing commnads  Resp Comments: Pt remains on full mechanical ventilation VC/AC+  Pt will be switched over to CPAP/PS  Will continue to monitor pt  Plan: continue current support until further orders

## 2018-11-23 NOTE — UTILIZATION REVIEW
[address]  Utilization Review Department  Phone: [phone]; Fax [phone]  [email or web address]  ATTENTION: Please call with any questions or concerns to [phone]  and carefully listen to the prompts so that you are directed to the right person  Send all requests for admission clinical reviews, approved or denied determinations and any other requests to fax [phone]  All voicemails are confidential     Initial Clinical Review    Admission: Date/Time/Statement: 11/22/18 @ 1441     Orders Placed This Encounter   Procedures    Inpatient Admission     Standing Status:   Standing     Number of Occurrences:   1     Order Specific Question:   Admitting Physician     Answer:   Isabela Villar     Order Specific Question:   Level of Care     Answer:   Critical Care [15]     Order Specific Question:   Estimated length of stay     Answer:   More than 2 Midnights     Order Specific Question:   Certification     Answer:   I certify that inpatient services are medically necessary for this patient for a duration of greater than two midnights  See H&P and MD Progress Notes for additional information about the patient's course of treatment  ED: Date/Time/Mode of Arrival:   ED Arrival Information     Expected Arrival Acuity Means of Arrival Escorted By Service Admission Type    - 11/22/2018 12:17 Immediate Ambulance [facility] Critical Care/ICU Emergency    Arrival Complaint    caridac arrest          Chief Complaint:   Chief Complaint   Patient presents with    Cardiac Arrest     witnessed arrest by family        History of Illness: 66 y o  male who presents to [facility] [place] following a witnessed cardiac arrest   Patient was at home with family and per EMS, patient's family that patient began not feeling right and collapsed  He was found to be pulseless and CPR was initiated  911 was activated    Police arrived 1st and brought a AED which was placed on patient, had 2 shocks  EMS arrived to find the patient in VFib arrest   EN route, patient had an additional 6 shocks, 6 doses of epinephrine, a 300 mg amiodarone bolus and was started on amiodarone drip  Each pulse trach revealed either VFib or V-tach, final pulse check revealed pulses and sinus tachycardia  EMS placed a [facility] airway  On arrival, patient was hypertensive  Patient was moving all extremities, and was given etomidate for exchange of Samuel airway to endotracheal tube  Cath lab was activated patient went to cath lab  During cardiac catheterization patient was found to have LAD lesion  This was stented  He was loaded with Brilinta, given Angiomax, and started on Kengreal drip  He returned to the intensive care unit intubated, sedated on propofol  ED Vital Signs:   ED Triage Vitals   Temperature Pulse Respirations Blood Pressure SpO2   11/22/18 1545 11/22/18 1222 11/22/18 1227 11/22/18 1224 11/22/18 1222   (!) 95 °F (35 °C) (!) 119 22 (!) 214/126 96 %      Temp Source Heart Rate Source Patient Position - Orthostatic VS BP Location FiO2 (%)   11/22/18 1800 11/22/18 1222 11/22/18 2000 11/22/18 2000 11/23/18 0700   Oral Monitor Lying Left arm 40      Pain Score       --               Wt Readings from Last 1 Encounters:   11/23/18 99 2 kg (218 lb 11 1 oz)       Vital Signs (abnormal): T 95-99 3, HR , RR 0-27, BP 65/45, 216/111, A-line 80//59, Pox 94-99%    Abnormal Labs/Diagnostic Test Results: WBC 31 15, K 3 3, CO2 18, Anion gap 20, Cr 1 57, , , Alk phos 129    CXR --1   Endotracheal tube in expected position  2   Subsegmental atelectasis in the right upper and midlung   No pneumothorax      ED Treatment:   Medication Administration from 11/22/2018 1217 to 11/22/2018 1450       Date/Time Order Dose Route Action     11/22/2018 1227 fentanyl citrate (PF) 100 MCG/2ML 100 mcg 100 mcg Intravenous Given     11/22/2018 1225 etomidate (AMIDATE) 2 mg/mL injection 30 mg 30 mg Intravenous Given     11/22/2018 1236 lidocaine (XYLOCAINE) 1 % injection 10 mL Infiltration Given     11/22/2018 1243 cangrelor tetrasodium (KENGREAL) bolus from bag 16 5 mcg Intravenous Given     11/22/2018 1437 cangrelor tetrasodium (KENGREAL) 50 mg in sodium chloride 0 9 % 250 mL infusion   Intravenous Handoff     11/22/2018 1243 cangrelor tetrasodium (KENGREAL) 50 mg in sodium chloride 0 9 % 250 mL infusion 132 mcg/min Intravenous New Bag     11/22/2018 1251 bivalirudin (ANGIOMAX) bolus from bag 16 5 mL Intravenous Given     11/22/2018 1400 midazolam (VERSED) injection 1 mg Intravenous Given     11/22/2018 1307 midazolam (VERSED) injection 1 mg Intravenous Given     11/22/2018 1244 midazolam (VERSED) injection 1 mg Intravenous Given     11/22/2018 1322 nitroGLYcerin (TRIDIL) 50 mg in 250 mL infusion (premix) 10 mcg/min  Rate/Dose Change     11/22/2018 1315 nitroGLYcerin (TRIDIL) 50 mg in 250 mL infusion (premix) 20 mcg Other New Bag     11/22/2018 1401 propofol (DIPRIVAN) 200 MG/20ML bolus injection 20 mg Intravenous Given     11/22/2018 1403 ticagrelor (BRILINTA) tablet 180 mg Oral Given     11/22/2018 1406 iodixanol (VISIPAQUE) 320 MG/ML injection 250 mL Intravenous Given       Past Medical/Surgical History:   No Additional Past Medical History       Admitting Diagnosis: Cardiac arrest ([facility] ) [I46 9]  V-tach ([facility] ) [I47 2]  STEMI (ST elevation myocardial infarction) ([facility] ) [I21 3]    Age/Sex: 66 y o  male    Assessment/Plan:   Assessment:   Principal Problem:    Ventricular fibrillation ([facility] )  Active Problems:    STEMI (ST elevation myocardial infarction) ([facility] )    CAD (coronary artery disease)    Transaminitis    Bandemia    STEVIE (acute kidney injury) ([facility] )    Plan:  VFib arrest secondary to STEMI of LAD status post PCI  - patient with PCI of LAD  - given Kengreal, Brilinta, and Angiomax  - dual anti platelet therapy per Cardiology recommendations  - beta-blocker per Cardiology recommendations  - trend troponin  - echocardiogram  Vent --> wean to extubate  A-line  Keep HOB > 30 degrees  Ramey cath  Strict I&O's  Npo  Fingerstick glucose checks ac & hs  SCD's  PT/OT evals    Admission Orders:  Scheduled Meds:   Current Facility-Administered Medications:  acetaminophen 650 mg Oral Q6H [facility]   acetaminophen 325 mg Oral Q6H PRN   aspirin 81 mg Oral Daily   atorvastatin 40 mg Oral Daily With Dinner   bacitracin 1 large application Topical BID   chlorhexidine 15 mL Swish & Spit Q12H [facility]   fentanyl citrate (PF) 50 mcg Intravenous Once   heparin (porcine) 5,000 Units Subcutaneous Q8H [facility]   insulin lispro 1-5 Units Subcutaneous Q6H [facility]   insulin lispro 1-6 Units Subcutaneous Q6H [facility]   lidocaine 1 patch Topical Daily   lidocaine 2 patch Topical Daily   magnesium sulfate 2 g Intravenous Once   metoprolol tartrate 25 mg Oral Q12H [facility]   multi-electrolyte 75 mL/hr Intravenous Continuous   nicotine 14 mg Transdermal Daily   norepinephrine 1-30 mcg/min Intravenous Titrated   ticagrelor 90 mg Oral Q12H [facility]

## 2018-11-23 NOTE — PLAN OF CARE
DISCHARGE PLANNING     Discharge to home or other facility with appropriate resources Progressing        INFECTION - ADULT     Absence or prevention of progression during hospitalization Progressing        Knowledge Deficit     Patient/family/caregiver demonstrates understanding of disease process, treatment plan, medications, and discharge instructions Progressing        Nutrition/Hydration-ADULT     Nutrient/Hydration intake appropriate for improving, restoring or maintaining nutritional needs Progressing        PAIN - ADULT     Verbalizes/displays adequate comfort level or baseline comfort level Progressing        Potential for Falls     Patient will remain free of falls Progressing        Prexisting or High Potential for Compromised Skin Integrity     Skin integrity is maintained or improved Progressing        SAFETY ADULT     Patient will remain free of falls Progressing     Maintain or return to baseline ADL function Progressing     Maintain or return mobility status to optimal level Progressing        SAFETY,RESTRAINT: NV/NON-SELF DESTRUCTIVE BEHAVIOR     Remains free of harm/injury (restraint for non violent/non self-detsructive behavior) Progressing     Returns to optimal restraint-free functioning Progressing

## 2018-11-24 PROBLEM — D72.825 BANDEMIA: Status: RESOLVED | Noted: 2018-11-22 | Resolved: 2018-11-24

## 2018-11-24 PROBLEM — G92.8 TOXIC METABOLIC ENCEPHALOPATHY: Status: ACTIVE | Noted: 2018-11-24

## 2018-11-24 LAB
ANION GAP SERPL CALCULATED.3IONS-SCNC: 9 MMOL/L (ref 4–13)
BASOPHILS # BLD AUTO: 0.01 THOUSANDS/ΜL (ref 0–0.1)
BASOPHILS NFR BLD AUTO: 0 % (ref 0–1)
BUN SERPL-MCNC: 22 MG/DL (ref 5–25)
CALCIUM SERPL-MCNC: 8.6 MG/DL (ref 8.3–10.1)
CHLORIDE SERPL-SCNC: 106 MMOL/L (ref 100–108)
CO2 SERPL-SCNC: 24 MMOL/L (ref 21–32)
CREAT SERPL-MCNC: 0.84 MG/DL (ref 0.6–1.3)
EOSINOPHIL # BLD AUTO: 0.1 THOUSAND/ΜL (ref 0–0.61)
EOSINOPHIL NFR BLD AUTO: 1 % (ref 0–6)
ERYTHROCYTE [DISTWIDTH] IN BLOOD BY AUTOMATED COUNT: 13.3 % (ref 11.6–15.1)
GFR SERPL CREATININE-BSD FRML MDRD: 98 ML/MIN/1.73SQ M
GLUCOSE SERPL-MCNC: 132 MG/DL (ref 65–140)
GLUCOSE SERPL-MCNC: 164 MG/DL (ref 65–140)
GLUCOSE SERPL-MCNC: 169 MG/DL (ref 65–140)
GLUCOSE SERPL-MCNC: 171 MG/DL (ref 65–140)
GLUCOSE SERPL-MCNC: 173 MG/DL (ref 65–140)
HCT VFR BLD AUTO: 37 % (ref 36.5–49.3)
HGB BLD-MCNC: 12.4 G/DL (ref 12–17)
IMM GRANULOCYTES # BLD AUTO: 0.07 THOUSAND/UL (ref 0–0.2)
IMM GRANULOCYTES NFR BLD AUTO: 1 % (ref 0–2)
LYMPHOCYTES # BLD AUTO: 1.07 THOUSANDS/ΜL (ref 0.6–4.47)
LYMPHOCYTES NFR BLD AUTO: 8 % (ref 14–44)
MAGNESIUM SERPL-MCNC: 2.3 MG/DL (ref 1.6–2.6)
MCH RBC QN AUTO: 30.4 PG (ref 26.8–34.3)
MCHC RBC AUTO-ENTMCNC: 33.5 G/DL (ref 31.4–37.4)
MCV RBC AUTO: 91 FL (ref 82–98)
MONOCYTES # BLD AUTO: 1.23 THOUSAND/ΜL (ref 0.17–1.22)
MONOCYTES NFR BLD AUTO: 9 % (ref 4–12)
NEUTROPHILS # BLD AUTO: 11.4 THOUSANDS/ΜL (ref 1.85–7.62)
NEUTS SEG NFR BLD AUTO: 81 % (ref 43–75)
NRBC BLD AUTO-RTO: 0 /100 WBCS
PLATELET # BLD AUTO: 187 THOUSANDS/UL (ref 149–390)
PMV BLD AUTO: 10.1 FL (ref 8.9–12.7)
POTASSIUM SERPL-SCNC: 4.5 MMOL/L (ref 3.5–5.3)
RBC # BLD AUTO: 4.08 MILLION/UL (ref 3.88–5.62)
SODIUM SERPL-SCNC: 139 MMOL/L (ref 136–145)
WBC # BLD AUTO: 13.88 THOUSAND/UL (ref 4.31–10.16)

## 2018-11-24 PROCEDURE — 83735 ASSAY OF MAGNESIUM: CPT | Performed by: PHYSICIAN ASSISTANT

## 2018-11-24 PROCEDURE — G8996 SWALLOW CURRENT STATUS: HCPCS

## 2018-11-24 PROCEDURE — 92610 EVALUATE SWALLOWING FUNCTION: CPT

## 2018-11-24 PROCEDURE — 82948 REAGENT STRIP/BLOOD GLUCOSE: CPT

## 2018-11-24 PROCEDURE — 80048 BASIC METABOLIC PNL TOTAL CA: CPT | Performed by: PHYSICIAN ASSISTANT

## 2018-11-24 PROCEDURE — 99233 SBSQ HOSP IP/OBS HIGH 50: CPT | Performed by: PHYSICIAN ASSISTANT

## 2018-11-24 PROCEDURE — G8997 SWALLOW GOAL STATUS: HCPCS

## 2018-11-24 PROCEDURE — 85025 COMPLETE CBC W/AUTO DIFF WBC: CPT | Performed by: PHYSICIAN ASSISTANT

## 2018-11-24 RX ORDER — OLANZAPINE 10 MG/1
10 TABLET, ORALLY DISINTEGRATING ORAL
Status: DISCONTINUED | OUTPATIENT
Start: 2018-11-24 | End: 2018-11-24

## 2018-11-24 RX ORDER — HYDRALAZINE HYDROCHLORIDE 20 MG/ML
10 INJECTION INTRAMUSCULAR; INTRAVENOUS ONCE
Status: COMPLETED | OUTPATIENT
Start: 2018-11-24 | End: 2018-11-24

## 2018-11-24 RX ORDER — LEVALBUTEROL 1.25 MG/.5ML
1.25 SOLUTION, CONCENTRATE RESPIRATORY (INHALATION) EVERY 6 HOURS PRN
Status: DISCONTINUED | OUTPATIENT
Start: 2018-11-24 | End: 2018-11-26

## 2018-11-24 RX ORDER — KETOROLAC TROMETHAMINE 30 MG/ML
15 INJECTION, SOLUTION INTRAMUSCULAR; INTRAVENOUS EVERY 6 HOURS SCHEDULED
Status: COMPLETED | OUTPATIENT
Start: 2018-11-24 | End: 2018-11-25

## 2018-11-24 RX ORDER — OLANZAPINE 10 MG/1
10 TABLET, ORALLY DISINTEGRATING ORAL EVERY 12 HOURS
Status: DISCONTINUED | OUTPATIENT
Start: 2018-11-24 | End: 2018-11-27

## 2018-11-24 RX ADMIN — HEPARIN SODIUM 5000 UNITS: 5000 INJECTION, SOLUTION INTRAVENOUS; SUBCUTANEOUS at 05:47

## 2018-11-24 RX ADMIN — INSULIN LISPRO 1 UNITS: 100 INJECTION, SOLUTION INTRAVENOUS; SUBCUTANEOUS at 17:44

## 2018-11-24 RX ADMIN — ASPIRIN 81 MG 81 MG: 81 TABLET ORAL at 08:11

## 2018-11-24 RX ADMIN — KETOROLAC TROMETHAMINE 15 MG: 30 INJECTION, SOLUTION INTRAMUSCULAR at 23:28

## 2018-11-24 RX ADMIN — BUSPIRONE HYDROCHLORIDE 15 MG: 5 TABLET ORAL at 08:11

## 2018-11-24 RX ADMIN — KETOROLAC TROMETHAMINE 15 MG: 30 INJECTION, SOLUTION INTRAMUSCULAR at 17:33

## 2018-11-24 RX ADMIN — ACETAMINOPHEN 650 MG: 160 SUSPENSION ORAL at 11:04

## 2018-11-24 RX ADMIN — BACITRACIN ZINC 1 LARGE APPLICATION: 500 OINTMENT TOPICAL at 17:43

## 2018-11-24 RX ADMIN — HYDRALAZINE HYDROCHLORIDE 10 MG: 20 INJECTION INTRAMUSCULAR; INTRAVENOUS at 23:22

## 2018-11-24 RX ADMIN — HEPARIN SODIUM 5000 UNITS: 5000 INJECTION, SOLUTION INTRAVENOUS; SUBCUTANEOUS at 15:00

## 2018-11-24 RX ADMIN — NICOTINE 14 MG: 14 PATCH TRANSDERMAL at 08:12

## 2018-11-24 RX ADMIN — CHLORHEXIDINE GLUCONATE 0.12% ORAL RINSE 15 ML: 1.2 LIQUID ORAL at 21:58

## 2018-11-24 RX ADMIN — METOPROLOL TARTRATE 25 MG: 25 TABLET, FILM COATED ORAL at 08:11

## 2018-11-24 RX ADMIN — TICAGRELOR 90 MG: 90 TABLET ORAL at 08:11

## 2018-11-24 RX ADMIN — INSULIN LISPRO 1 UNITS: 100 INJECTION, SOLUTION INTRAVENOUS; SUBCUTANEOUS at 05:47

## 2018-11-24 RX ADMIN — INSULIN LISPRO 1 UNITS: 100 INJECTION, SOLUTION INTRAVENOUS; SUBCUTANEOUS at 00:06

## 2018-11-24 RX ADMIN — DEXMEDETOMIDINE HYDROCHLORIDE 1.2 MCG/KG/HR: 100 INJECTION, SOLUTION INTRAVENOUS at 17:45

## 2018-11-24 RX ADMIN — SODIUM CHLORIDE, SODIUM GLUCONATE, SODIUM ACETATE, POTASSIUM CHLORIDE AND MAGNESIUM CHLORIDE 75 ML/HR: 526; 502; 368; 37; 30 INJECTION, SOLUTION INTRAVENOUS at 23:40

## 2018-11-24 RX ADMIN — DEXMEDETOMIDINE HYDROCHLORIDE 1.2 MCG/KG/HR: 100 INJECTION, SOLUTION INTRAVENOUS at 14:25

## 2018-11-24 RX ADMIN — HEPARIN SODIUM 5000 UNITS: 5000 INJECTION, SOLUTION INTRAVENOUS; SUBCUTANEOUS at 21:58

## 2018-11-24 RX ADMIN — CHLORHEXIDINE GLUCONATE 0.12% ORAL RINSE 15 ML: 1.2 LIQUID ORAL at 08:11

## 2018-11-24 RX ADMIN — KETOROLAC TROMETHAMINE 15 MG: 30 INJECTION, SOLUTION INTRAMUSCULAR at 11:04

## 2018-11-24 RX ADMIN — BACITRACIN ZINC 1 LARGE APPLICATION: 500 OINTMENT TOPICAL at 08:11

## 2018-11-24 RX ADMIN — LIDOCAINE 2 PATCH: 50 PATCH CUTANEOUS at 08:11

## 2018-11-24 RX ADMIN — DEXMEDETOMIDINE HYDROCHLORIDE 1.2 MCG/KG/HR: 100 INJECTION, SOLUTION INTRAVENOUS at 20:13

## 2018-11-24 RX ADMIN — DEXMEDETOMIDINE HYDROCHLORIDE 1.2 MCG/KG/HR: 100 INJECTION, SOLUTION INTRAVENOUS at 15:49

## 2018-11-24 RX ADMIN — OLANZAPINE 10 MG: 10 TABLET, ORALLY DISINTEGRATING ORAL at 10:39

## 2018-11-24 RX ADMIN — DEXMEDETOMIDINE HYDROCHLORIDE 1.2 MCG/KG/HR: 100 INJECTION, SOLUTION INTRAVENOUS at 18:05

## 2018-11-24 NOTE — SPEECH THERAPY NOTE
Speech-Language Pathology Bedside Swallow Evaluation        Patient Name: Gail Culp    MRENO'D Date: 11/24/2018     Problem List  Patient Active Problem List   Diagnosis    [facility] fibrillation ([facility] )    Anxiety    [facility]    CAD (coronary artery disease)    Transaminitis    STEVIE (acute kidney injury) ([facility] )    Cardiac arrest ([facility] )    Toxic metabolic encephalopathy       Past Medical History  History reviewed  No pertinent past medical history  Past Surgical History  History reviewed  No pertinent surgical history  Current Medical Status  Pt is a 66 y o  male who presented to [facility] with status post VFib arrest   [place] chief enroute  Patient was taken straight to the cath lab and underwent cardiac catheterization with Dr Anibal Saucedo  CRUZ was placed to LAD  Patient was found to have metabolic derangement remained intubated overnight  Throughout the night patient borderline hypotensive received 500 cc crystalloid without improvement of blood pressure  Arterial line was placed for more accurate hemodynamic monitoring  Low-dose peripheral Levophed was started to support BP  Patient was intubated from 11/22 and extubated on 11/23  Swallow evaluation ordered to determine appropriateness for po  Past medical history:   Please see H&P for details    Special Studies:  11/23 CT head: No acute intracranial abnormality  11/23 CXR: Endotracheal and enteric feeding tubes as described  Low lung volumes with interval resolution of previously seen right midlung field atelectasis  Some platelike atelectasis is now seen in the left lower lung field  Interval improvement in previously seen pulmonary vascular prominence/congestion  No other significant interval change    11/22 CXR: 1  Endotracheal tube in expected position  2   Subsegmental atelectasis in the right upper and midlung  No pneumothorax      Social/Education/Vocational Hx:  Pt lives with family      Swallow Information   Current Risks for Dysphagia & Aspiration: recent intubation, AMS and change in respiratory status     Current Symptoms/Concerns: change in respiratory status    Current Diet: NPO      Baseline Diet: regular diet and thin liquids      Baseline Assessment   Behavior/Cognition: waxing and waning arousal level and decreased attention    Speech/Language Status: able to participate in basic conversation and able to follow commands inconsistently- patient repeatedly reporting chest pain which RN states he has been expressing all morning negatively impacting participation in today's assessment    Patient Positioning: upright in recliner    Swallow Mechanism Exam   Facial: symmetrical  Labial: WFL  Lingual: WFL  Velum: symmetrical  Mandible: adequate ROM  Dentition: Sparse dentition- patient dentures in the room however refused to wear them stating that they caused discomfort- stated he does not plan to wear them while eating  Vocal quality:clear/adequate   Volitional Cough: strong/productive   Respiratory: 2L NC  Swallow Mechanics: WFL upon dry swallow     Consistencies Assessed and Performance   Consistencies Administered: thin liquids, nectar thick, honey thick, puree and mechanical soft solids  Specific materials administered included: star cracker (2 small bites), applesauce thickened liquids were administered by cup and thin liquids were administered by consecutive cup and straw sips    Oral Stage:   Mastication was prolonged and reduced with soft solids- eventually adequate  Bolus formation and transfer was slow yet functional with no significant oral residue noted  Cannot r/o loss of bolus posteriorly with soft solids during prolonged mastication- see below  Pharyngeal Stage:   Swallowing initiation appeared prompt  Laryngeal rise was palpated and judged to be within functional limits  Consistent cough noted with star crackers today    No additional coughing, throat clearing, change in vocal quality or respiratory status noted today  Esophageal Concerns: none reported     Patient was also seen with whole meds without difficulty  Despite mental status patient was able to self feed today  Summary   Limited assessment of solids today given patients mental status and overall pain level  s/s suggestive of mild-moderate oral and suspected minimal pharyngeal dysphagia  He does not appear to be at a significant risk for aspiration at this time however given current mental status, refusal to wear dentition and noted cough with minimal soft solids will recommend initiation of puree textures until status changes and more thorough assessment is able to be completed  Patient with good prognosis to return to baseline regular textures with thin liquids within the next few days  Recommendations: puree/level 1 diet and thin liquids     Recommended Form of Meds: as per patient     Aspiration precautions and compensatory swallowing strategies: upright posture, only feed when fully alert, slow rate of feeding, small bites/sips and alternating bites and sips     Results Reviewed with: patient, RN, CRNP and family     Treatment Recommendations: Chaitanya Ibis will continue to follow for likely upgrade      Dysphagia Goals: pt will tolerate regular textures with thin liquids without s/s of aspiration x3      Frankey Pastures, M S , [address] [place] [address] Language Pathologist   [id number]

## 2018-11-24 NOTE — PLAN OF CARE
Problem: SLP ADULT - SWALLOWING, IMPAIRED  Goal: Initial SLP swallow eval performed  Outcome: Completed Date Met: 11/24/18

## 2018-11-24 NOTE — PROGRESS NOTES
Progress Note - Critical Care   Billy Zhang 66 y o  male MRN: [id number]  Unit/Bed#:  Encounter: [id number]    Assessment:   Principal Problem:    Ventricular fibrillation ([facility] )  Active Problems:    Cardiac arrest ([facility] [address] )    STEMI (ST elevation myocardial infarction) ([facility] )    CAD (coronary artery disease)    Ischemic cardiomyopathy    Toxic metabolic encephalopathy    STEVIE (acute kidney injury) ([facility] )    Transaminitis    Anxiety  Resolved Problems:    Bandemia    Plan:   Neuro:   · Toxic metabolic encephalopathy-improved  CT head without acute intracranial abnormality  Frequent neuro checks every orientation  Delirium precautions  Regulate sleep-wake cycles  Daily CAM ICU  · Agitation:  Wean Precedex gtt to off  · Analgesia:  Scheduled Tylenol, and Lidoderm patch  Pain has been relatively well controlled however may require low-dose opioids in setting of anterior chest wall contusions as result of CPR  · Anxiety:  Resume home buspirone 15 mg b i d   CV:   · STEMI/VFib arrest s/p PCI w/ CRUZ to LAD:  Continue dual platelet therapy ASA/Brilinta  High-dose statin therapy  Metoprolol 25 mg b i d   Initiate low-dose ACE-inhibitor today  Daily EKG  · Ischemic cardiomyopathy EF revealing EF of 20-25% with severe hypokinesis  May benefit from [address] prior to discharge  Patient appears euvolemic  Goal fluid balance net even  Lung:   · Acute hypoxic respiratory failure-resolved  Extubated without difficulty 11/23  · Maintain O2 sats greater than 92%  · Encourage good incentive spirometry/pulmonary toileting  GI:   · Transaminitis likely secondary to shock liver-improving  · Ppx: senna  FEN:   · Gentle IV fluid hydration while NPO  · Monitor electrolytes  Replete as warranted  Mg > 2 0, K> 4 0  · Will need bedside speech swallow evaluation  :   · STEVIE- resolved  · Strict I&Os  · Monitor BUN/creatinine  · Ramey: No   A retention protocol  ID:   · No obvious source of infectious etiology  Monitor off of antibiotics  · Trend fever curve and WBC count  Heme:   · H&H and platelets stable  Continue trend monitor  · ppx vte: sqh  Endo:   · Hyperglycemia  Hemoglobin A1c 7 6  Likely stress response  Continue sliding scale insulin as needed  Msk/Skin:   · Frequent offloading repositioning void skin breakdown  · PT/OT  Disposition:   · Step down level while requiring Precedex infusion    ______________________________________________________________________  Chief Complaint: No chief complaint  HPI/24hr events:   Patient extubated   He remained persistently agitated  He was sent down for CT head which did not reveal acute intracranial abnormality  He was started on Precedex infusion with improvement in agitation  No acute events overnight     ______________________________________________________________________  Physical Exam  [place] Agitation Sedation Scale (RASS): Drowsy  Physical Exam   Constitutional: No distress  HENT:   Head: Normocephalic and atraumatic  Eyes: Pupils are equal, round, and reactive to light  EOM are normal    Cardiovascular: Normal rate and regular rhythm  No murmur heard  Pulmonary/Chest: Effort normal and breath sounds normal  No respiratory distress  He has no wheezes  He has no rales  Abdominal: Soft  Bowel sounds are normal  He exhibits no distension  There is no tenderness  There is no rebound and no guarding  Musculoskeletal: Normal range of motion  He exhibits no edema  Neurological:   Moves all 4 extremities with purpose and to command  Restless at times  Will answer simple questions however remains disoriented replies with delayed response  Vitals reviewed      ______________________________________________________________________  Temperature:   Temp (24hrs), Av 3 °F (36 8 °C), Min:97 9 °F (36 6 °C), Max:99 1 °F (37 3 °C)    Current Temperature: 98 4 °F (36 9 °C)    Vitals:    18 2200 18 2300 18 0000 18 0100   BP: 111/69 124/81 122/81 124/81   BP Location:       Pulse: 67 64 64 63   Resp:       Temp:       TempSrc:       SpO2: 95% 98% 98% 98%   Weight:       Height:         Arterial Line BP: 78/71  Arterial Line MAP (mmHg): 74 mmHg     Weights:   IBW: 77 6 kg    Body mass index is 29 66 kg/m²  Weight (last 2 days)     Date/Time   Weight    11/23/18 0600  99 2 (218 7)    11/22/18 1521  99 1 (218 48)            Height: 6' (182 9 cm)    Lab Results   Component Value Date    PHART 7 406 11/23/2018    [id number] 30 8 (L) 11/23/2018    PO2ART 134 7 (H) 11/23/2018    [id number] 18 9 (L) 11/23/2018    BEART -4 7 11/23/2018    SOURCE Line, Arterial 11/23/2018     Intake and Outputs:  I/O       11/22 0701 - 11/23 0700 11/23 0701 - 11/24 0700    P  O   0    I V  (mL/kg) 1123 9 (11 3) 1252 3 (12 6)    NG/     IV Piggyback 750 50    Total Intake(mL/kg) 2003 9 (20 2) 1302 3 (13 1)    Urine (mL/kg/hr) 1595 670 (0 3)    Emesis/NG output 1100     Total Output 2695 670    Net -691 1 +632 3              Nutrition:        Diet Orders            Start     Ordered    11/22/18 1451  Diet NPO  Diet effective now     Question Answer Comment   Diet Type NPO    RD to adjust diet per protocol?  Yes        11/22/18 1450        Labs:      Results from last 7 days  Lab Units 11/23/18  0449 11/22/18  1516 11/22/18  1240   WBC Thousand/uL 14 67* 23 53* 31 15*   HEMOGLOBIN g/dL 12 4 14 6 15 9   HEMATOCRIT % 37 2 44 6 48 4   PLATELETS Thousands/uL 232 323 369   NEUTROS PCT % 83* 89*  --    MONOS PCT % 8 6  --    MONO PCT %  --   --  3*        Results from last 7 days  Lab Units 11/23/18  0449 11/22/18  1514 11/22/18  1240 11/22/18  1226   POTASSIUM mmol/L 5 2 3 8 3 3*  --    CHLORIDE mmol/L 106 102 101  --    CO2 mmol/L 22 22 18*  --    CO2, I-STAT mmol/L  --   --   --  19*   BUN mg/dL 25 19 20  --    CREATININE mg/dL 1 01 1 15 1 57*  --    CALCIUM mg/dL 8 0* 8 8 9 1  --    ALK PHOS U/L 69 101 129*  --    ALT U/L 254* 374* 428*  --    AST U/L 237* 372* 416*  --    GLUCOSE, ISTAT mg/dl  --   --   --  332*       Results from last 7 days  Lab Units 11/23/18  0449 11/22/18  1514 11/22/18  1240   MAGNESIUM mg/dL 2 3 2 6 3 3*       Results from last 7 days  Lab Units 11/23/18  0449 11/22/18  1514 11/22/18  1240   PHOSPHORUS mg/dL 2 9 4 2 7 2*        Results from last 7 days  Lab Units 11/22/18  1811 11/22/18  1514 11/22/18  1240   INR  1 36* 2 09* 1 17   PTT seconds  --  84* 38       Results from last 7 days  Lab Units 11/23/18  0449   LACTIC ACID mmol/L 1 7       0  Lab Value Date/Time   TROPONINI 27 92 (H) 11/23/2018 0955   TROPONINI 34 56 (H) 11/23/2018 0449   TROPONINI 34 45 (H) 11/23/2018 0018   TROPONINI 33 74 (H) 11/22/2018 2019   TROPONINI 10 63 (H) 11/22/2018 1609   TROPONINI 5 20 (H) 11/22/2018 1514   TROPONINI 1 11 (H) 11/22/2018 1240     Imaging:  I have personally reviewed pertinent reports  and I have personally reviewed pertinent films in PACS  EKG:   Micro:  Blood Culture:   Lab Results   Component Value Date    BLOODCX No Growth at 24 hrs  11/22/2018    BLOODCX No Growth at 24 hrs  11/22/2018     Urine Culture: No results found for: URINECX  Sputum Culture: No components found for: SPUTUMCX  Wound Culure: No results found for: [facility]     Lab Results   Component Value Date    BLOODCX No Growth at 24 hrs  11/22/2018    BLOODCX No Growth at 24 hrs  11/22/2018     Allergies:    Allergies   Allergen Reactions    Barley Grass Throat Swelling     Medications:   Scheduled Meds:    Current Facility-Administered Medications:  acetaminophen 650 mg Oral Q6H [address] Jacqueline Martin PA-C    acetaminophen 325 mg Oral Q6H PRN Charlie Carp PA-C    aspirin 81 mg Oral Daily LEAH Hernandez-EL    atorvastatin 40 mg Oral Daily With Lesvia Chris PA-C    bacitracin 1 large application Topical BID LEAH Hernandez-EL    chlorhexidine 15 mL Swish [employer] Spit Q12H [address] Marylu Hernandez    dexmedetomidine 0 1-0 7 mcg/kg/hr Intravenous Titrated Charlie García PA-C Last Rate: 0 2 mcg/kg/hr (11/23/18 2233)   fentanyl citrate (PF) 50 mcg Intravenous Once Artemio Noble PA-C    heparin (porcine) 5,000 Units Subcutaneous [facility] Artemio Noble PA-C    insulin lispro 1-6 Units Subcutaneous Q6H [facility] [address]CAT[address] Gomez    lidocaine 1 patch Topical Daily Mere Weldon PA-C    lidocaine 2 patch Topical Daily Artemio Noble PA-C    metoprolol tartrate 25 mg Oral Q12H [facility] [place], [place]    multi-electrolyte 75 mL/hr Intravenous Continuous [place], [place] Last Rate: 75 mL/hr (11/23/18 2104)   nicotine 14 mg Transdermal Daily [place], [place]    ticagrelor 90 mg Oral Q12H [facility] Mili Jin MD      Continuous Infusions:    dexmedetomidine 0 1-0 7 mcg/kg/hr Last Rate: 0 2 mcg/kg/hr (11/23/18 2233)   multi-electrolyte 75 mL/hr Last Rate: 75 mL/hr (11/23/18 2104)     PRN Meds:    acetaminophen 325 mg Q6H PRN     VTE Pharmacologic Prophylaxis: Heparin  VTE Mechanical Prophylaxis: sequential compression device  Invasive lines and devices: Invasive Devices     Peripheral Intravenous Line            Peripheral IV 11/22/18 Left Wrist 1 day    Peripheral IV 11/23/18 Right Forearm less than 1 day                   Counseling / Coordination of Care  Total Critical Care time spent 33 minutes excluding procedures, teaching and family updates  Code Status: Level 1 - Full Code    Portions of the record may have been created with voice recognition software  Occasional wrong word or "sound a like" substitutions may have occurred due to the inherent limitations of voice recognition software  Read the chart carefully and recognize, using context, where substitutions have occurred      Artemio Noble PA-C

## 2018-11-24 NOTE — PROGRESS NOTES
Progress Note - Cardiology   Billy Zhang 66 y o  male MRN: [id number]  Unit/Bed#:  Encounter: [id number]    Assessment:  1  S/P Vfib cardiac arrest  2  S/P STEMI  3  S/P CRUZ to LAD  4  Ischemic cardiomyopathy    Plan:  1  Pt will likely need ICD  2  Continue current meds  Subjective/Objective   Chief Complaint: None    Subjective: No c/o    Objective: Extubated, confused    Vitals: /91   Pulse 75   Temp 97 5 °F (36 4 °C) (Oral)   Resp (!) 28   Ht 6' (1 829 m)   Wt 99 7 kg (219 lb 12 8 oz)   SpO2 96%   BMI 29 81 kg/m²   Vitals:    11/23/18 0600 11/24/18 0552   Weight: 99 2 kg (218 lb 11 1 oz) 99 7 kg (219 lb 12 8 oz)     Orthostatic Blood Pressures      Most Recent Value   Blood Pressure  150/91 filed at 11/24/2018 1600   Patient Position - Orthostatic VS  Lying filed at 11/24/2018 1500            Intake/Output Summary (Last 24 hours) at 11/24/18 1730  Last data filed at 11/24/18 1601   Gross per 24 hour   Intake          2181 78 ml   Output             1200 ml   Net           981 78 ml       Invasive Devices     Peripheral Intravenous Line            Peripheral IV 11/22/18 Left Wrist 2 days    Peripheral IV 11/23/18 Right Forearm less than 1 day          Drain            Urethral Catheter less than 1 day                Review of Systems: Cardiovascular ROS: no chest pain or dyspnea on exertion    Physical Exam:   Cardiovascular: Normal rate, regular rhythm and normal heart sounds     No murmur heard    Pulmonary/Chest: Breath sounds normal      Lab Results:   CBC with diff:   Results from last 7 days  Lab Units 11/24/18  0509   WBC Thousand/uL 13 88*   RBC Million/uL 4 08   HEMOGLOBIN g/dL 12 4   HEMATOCRIT % 37 0   MCV fL 91   MCH pg 30 4   MCHC g/dL 33 5   RDW % 13 3   MPV fL 10 1   PLATELETS Thousands/uL 187     CMP:   Results from last 7 days  Lab Units 11/24/18  0509 11/23/18  0449  11/22/18  1226   SODIUM mmol/L 139 136  < >  --    POTASSIUM mmol/L 4 5 5 2  < >  --    CHLORIDE mmol/L 106 106  < >  --    CO2 mmol/L 24 22  < >  --    CO2, I-STAT mmol/L  --   --   --  19*   BUN mg/dL 22 25  < >  --    CREATININE mg/dL 0 84 1 01  < >  --    GLUCOSE, ISTAT mg/dl  --   --   --  332*   CALCIUM mg/dL 8 6 8 0*  < >  --    AST U/L  --  237*  < >  --    ALT U/L  --  254*  < >  --    ALK PHOS U/L  --  69  < >  --    EGFR ml/min/1 73sq m 98 83  < > 57   < > = values in this interval not displayed  Troponin:   0  Lab Value Date/Time   TROPONINI 27 92 (H) 11/23/2018 0955   TROPONINI 34 56 (H) 11/23/2018 0449   TROPONINI 34 45 (H) 11/23/2018 0018   TROPONINI 33 74 (H) 11/22/2018 2019   TROPONINI 10 63 (H) 11/22/2018 1609   TROPONINI 5 20 (H) 11/22/2018 1514   TROPONINI 1 11 (H) 11/22/2018 1240     BNP:   Results from last 7 days  Lab Units 11/24/18  0509  11/22/18  1226   POTASSIUM mmol/L 4 5  < >  --    CHLORIDE mmol/L 106  < >  --    CO2 mmol/L 24  < >  --    CO2, I-STAT mmol/L  --   --  19*   BUN mg/dL 22  < >  --    CREATININE mg/dL 0 84  < >  --    GLUCOSE, ISTAT mg/dl  --   --  332*   CALCIUM mg/dL 8 6  < >  --    EGFR ml/min/1 73sq m 98  < > 57   < > = values in this interval not displayed  Coags:   Results from last 7 days  Lab Units 11/22/18  1811 11/22/18  1514   PTT seconds  --  84*   INR  1 36* 2 09*     TSH:   Results from last 7 days  Lab Units 11/22/18  1514   TSH 3RD GENERATON uIU/mL 5 993*     Magnesium:   Results from last 7 days  Lab Units 11/24/18  0509   MAGNESIUM mg/dL 2 3     Lipid Profile:   Results from last 7 days  Lab Units 11/22/18  1514   HDL mg/dL 37*   LDL CALC mg/dL 81   TRIGLYCERIDES mg/dL 117     Imaging: I have personally reviewed pertinent films in PACS  EKG: NSR, Anteroseptal infarct  VTE Pharmacologic Prophylaxis:   VTE Mechanical Prophylaxis:     Counseling / Coordination of Care  Total Critical Care time spent 30 minutes excluding procedures, teaching and family updates    27

## 2018-11-24 NOTE — PLAN OF CARE
DISCHARGE PLANNING     Discharge to home or other facility with appropriate resources Progressing        DISCHARGE PLANNING - CARE MANAGEMENT     Discharge to post-acute care or home with appropriate resources Progressing        INFECTION - ADULT     Absence or prevention of progression during hospitalization Progressing        Knowledge Deficit     Patient/family/caregiver demonstrates understanding of disease process, treatment plan, medications, and discharge instructions Progressing        Nutrition/Hydration-ADULT     Nutrient/Hydration intake appropriate for improving, restoring or maintaining nutritional needs Progressing        PAIN - ADULT     Verbalizes/displays adequate comfort level or baseline comfort level Progressing        Potential for Falls     Patient will remain free of falls Progressing        Prexisting or High Potential for Compromised Skin Integrity     Skin integrity is maintained or improved Progressing        SAFETY ADULT     Patient will remain free of falls Progressing     Maintain or return to baseline ADL function Progressing     Maintain or return mobility status to optimal level Progressing        SAFETY,RESTRAINT: NV/NON-SELF DESTRUCTIVE BEHAVIOR     Remains free of harm/injury (restraint for non violent/non self-detsructive behavior) Progressing     Returns to optimal restraint-free functioning Progressing

## 2018-11-25 LAB
ANION GAP SERPL CALCULATED.3IONS-SCNC: 9 MMOL/L (ref 4–13)
BUN SERPL-MCNC: 26 MG/DL (ref 5–25)
CALCIUM SERPL-MCNC: 8.9 MG/DL (ref 8.3–10.1)
CHLORIDE SERPL-SCNC: 106 MMOL/L (ref 100–108)
CO2 SERPL-SCNC: 25 MMOL/L (ref 21–32)
CREAT SERPL-MCNC: 0.9 MG/DL (ref 0.6–1.3)
ERYTHROCYTE [DISTWIDTH] IN BLOOD BY AUTOMATED COUNT: 13.2 % (ref 11.6–15.1)
GFR SERPL CREATININE-BSD FRML MDRD: 95 ML/MIN/1.73SQ M
GLUCOSE SERPL-MCNC: 148 MG/DL (ref 65–140)
GLUCOSE SERPL-MCNC: 169 MG/DL (ref 65–140)
GLUCOSE SERPL-MCNC: 172 MG/DL (ref 65–140)
GLUCOSE SERPL-MCNC: 231 MG/DL (ref 65–140)
HCT VFR BLD AUTO: 38 % (ref 36.5–49.3)
HGB BLD-MCNC: 12.9 G/DL (ref 12–17)
MAGNESIUM SERPL-MCNC: 2.2 MG/DL (ref 1.6–2.6)
MCH RBC QN AUTO: 30.4 PG (ref 26.8–34.3)
MCHC RBC AUTO-ENTMCNC: 33.9 G/DL (ref 31.4–37.4)
MCV RBC AUTO: 89 FL (ref 82–98)
PLATELET # BLD AUTO: 208 THOUSANDS/UL (ref 149–390)
PMV BLD AUTO: 10.3 FL (ref 8.9–12.7)
POTASSIUM SERPL-SCNC: 4.4 MMOL/L (ref 3.5–5.3)
RBC # BLD AUTO: 4.25 MILLION/UL (ref 3.88–5.62)
SODIUM SERPL-SCNC: 140 MMOL/L (ref 136–145)
WBC # BLD AUTO: 14.32 THOUSAND/UL (ref 4.31–10.16)

## 2018-11-25 PROCEDURE — 83735 ASSAY OF MAGNESIUM: CPT | Performed by: NURSE PRACTITIONER

## 2018-11-25 PROCEDURE — 85027 COMPLETE CBC AUTOMATED: CPT | Performed by: NURSE PRACTITIONER

## 2018-11-25 PROCEDURE — 99233 SBSQ HOSP IP/OBS HIGH 50: CPT | Performed by: ANESTHESIOLOGY

## 2018-11-25 PROCEDURE — 80048 BASIC METABOLIC PNL TOTAL CA: CPT | Performed by: NURSE PRACTITIONER

## 2018-11-25 PROCEDURE — 82948 REAGENT STRIP/BLOOD GLUCOSE: CPT

## 2018-11-25 RX ORDER — LISINOPRIL 5 MG/1
5 TABLET ORAL DAILY
Status: DISCONTINUED | OUTPATIENT
Start: 2018-11-25 | End: 2018-11-29

## 2018-11-25 RX ORDER — FENTANYL CITRATE 50 UG/ML
25 INJECTION, SOLUTION INTRAMUSCULAR; INTRAVENOUS ONCE
Status: COMPLETED | OUTPATIENT
Start: 2018-11-25 | End: 2018-11-25

## 2018-11-25 RX ORDER — OLANZAPINE 10 MG/1
10 INJECTION, POWDER, LYOPHILIZED, FOR SOLUTION INTRAMUSCULAR ONCE
Status: COMPLETED | OUTPATIENT
Start: 2018-11-25 | End: 2018-11-25

## 2018-11-25 RX ORDER — KETOROLAC TROMETHAMINE 30 MG/ML
15 INJECTION, SOLUTION INTRAMUSCULAR; INTRAVENOUS EVERY 6 HOURS SCHEDULED
Status: DISCONTINUED | OUTPATIENT
Start: 2018-11-25 | End: 2018-11-25

## 2018-11-25 RX ORDER — LORAZEPAM 2 MG/ML
0.5 INJECTION INTRAMUSCULAR ONCE
Status: COMPLETED | OUTPATIENT
Start: 2018-11-25 | End: 2018-11-25

## 2018-11-25 RX ORDER — OXYCODONE HYDROCHLORIDE 5 MG/1
2.5 TABLET ORAL EVERY 4 HOURS PRN
Status: DISCONTINUED | OUTPATIENT
Start: 2018-11-25 | End: 2018-12-02 | Stop reason: HOSPADM

## 2018-11-25 RX ADMIN — INSULIN LISPRO 1 UNITS: 100 INJECTION, SOLUTION INTRAVENOUS; SUBCUTANEOUS at 00:18

## 2018-11-25 RX ADMIN — HEPARIN SODIUM 5000 UNITS: 5000 INJECTION, SOLUTION INTRAVENOUS; SUBCUTANEOUS at 05:37

## 2018-11-25 RX ADMIN — OLANZAPINE 10 MG: 10 INJECTION, POWDER, FOR SOLUTION INTRAMUSCULAR at 20:17

## 2018-11-25 RX ADMIN — LORAZEPAM 0.5 MG: 2 INJECTION INTRAMUSCULAR; INTRAVENOUS at 21:53

## 2018-11-25 RX ADMIN — BUSPIRONE HYDROCHLORIDE 15 MG: 5 TABLET ORAL at 08:50

## 2018-11-25 RX ADMIN — OLANZAPINE 10 MG: 10 TABLET, ORALLY DISINTEGRATING ORAL at 11:02

## 2018-11-25 RX ADMIN — DEXMEDETOMIDINE HYDROCHLORIDE 1.2 MCG/KG/HR: 100 INJECTION, SOLUTION INTRAVENOUS at 01:18

## 2018-11-25 RX ADMIN — FENTANYL CITRATE 25 MCG: 50 INJECTION INTRAMUSCULAR; INTRAVENOUS at 21:05

## 2018-11-25 RX ADMIN — LIDOCAINE 2 PATCH: 50 PATCH CUTANEOUS at 08:49

## 2018-11-25 RX ADMIN — METOPROLOL TARTRATE 25 MG: 25 TABLET, FILM COATED ORAL at 08:49

## 2018-11-25 RX ADMIN — DEXMEDETOMIDINE HYDROCHLORIDE 1.2 MCG/KG/HR: 100 INJECTION, SOLUTION INTRAVENOUS at 22:44

## 2018-11-25 RX ADMIN — DEXMEDETOMIDINE HYDROCHLORIDE 1.2 MCG/KG/HR: 100 INJECTION, SOLUTION INTRAVENOUS at 02:51

## 2018-11-25 RX ADMIN — KETOROLAC TROMETHAMINE 15 MG: 30 INJECTION, SOLUTION INTRAMUSCULAR at 11:02

## 2018-11-25 RX ADMIN — DEXMEDETOMIDINE HYDROCHLORIDE 0.7 MCG/KG/HR: 100 INJECTION, SOLUTION INTRAVENOUS at 15:14

## 2018-11-25 RX ADMIN — INSULIN LISPRO 1 UNITS: 100 INJECTION, SOLUTION INTRAVENOUS; SUBCUTANEOUS at 23:52

## 2018-11-25 RX ADMIN — CHLORHEXIDINE GLUCONATE 0.12% ORAL RINSE 15 ML: 1.2 LIQUID ORAL at 21:05

## 2018-11-25 RX ADMIN — CHLORHEXIDINE GLUCONATE 0.12% ORAL RINSE 15 ML: 1.2 LIQUID ORAL at 08:49

## 2018-11-25 RX ADMIN — NICOTINE 14 MG: 14 PATCH TRANSDERMAL at 08:49

## 2018-11-25 RX ADMIN — DEXMEDETOMIDINE HYDROCHLORIDE 1.2 MCG/KG/HR: 100 INJECTION, SOLUTION INTRAVENOUS at 09:07

## 2018-11-25 RX ADMIN — BACITRACIN ZINC 1 LARGE APPLICATION: 500 OINTMENT TOPICAL at 18:28

## 2018-11-25 RX ADMIN — DEXMEDETOMIDINE HYDROCHLORIDE 1 MCG/KG/HR: 100 INJECTION, SOLUTION INTRAVENOUS at 18:25

## 2018-11-25 RX ADMIN — KETOROLAC TROMETHAMINE 15 MG: 30 INJECTION, SOLUTION INTRAMUSCULAR at 18:21

## 2018-11-25 RX ADMIN — ASPIRIN 81 MG 81 MG: 81 TABLET ORAL at 08:49

## 2018-11-25 RX ADMIN — DEXMEDETOMIDINE HYDROCHLORIDE 1.2 MCG/KG/HR: 100 INJECTION, SOLUTION INTRAVENOUS at 06:51

## 2018-11-25 RX ADMIN — DEXMEDETOMIDINE HYDROCHLORIDE 1.2 MCG/KG/HR: 100 INJECTION, SOLUTION INTRAVENOUS at 04:45

## 2018-11-25 RX ADMIN — DEXMEDETOMIDINE HYDROCHLORIDE 1 MCG/KG/HR: 100 INJECTION, SOLUTION INTRAVENOUS at 20:35

## 2018-11-25 RX ADMIN — HEPARIN SODIUM 5000 UNITS: 5000 INJECTION, SOLUTION INTRAVENOUS; SUBCUTANEOUS at 21:56

## 2018-11-25 RX ADMIN — HEPARIN SODIUM 5000 UNITS: 5000 INJECTION, SOLUTION INTRAVENOUS; SUBCUTANEOUS at 16:33

## 2018-11-25 RX ADMIN — LISINOPRIL 5 MG: 5 TABLET ORAL at 16:33

## 2018-11-25 RX ADMIN — ACETAMINOPHEN 650 MG: 160 SUSPENSION ORAL at 11:02

## 2018-11-25 RX ADMIN — ATORVASTATIN CALCIUM 40 MG: 40 TABLET, FILM COATED ORAL at 16:33

## 2018-11-25 RX ADMIN — TICAGRELOR 90 MG: 90 TABLET ORAL at 08:48

## 2018-11-25 RX ADMIN — BACITRACIN ZINC 1 LARGE APPLICATION: 500 OINTMENT TOPICAL at 08:53

## 2018-11-25 RX ADMIN — INSULIN LISPRO 3 UNITS: 100 INJECTION, SOLUTION INTRAVENOUS; SUBCUTANEOUS at 12:24

## 2018-11-25 RX ADMIN — KETOROLAC TROMETHAMINE 15 MG: 30 INJECTION, SOLUTION INTRAMUSCULAR at 05:37

## 2018-11-25 NOTE — PROGRESS NOTES
Progress Note - Critical Care   Billy Zhang 66 y o  male MRN: [id number]  Unit/Bed#:  Encounter: [id number]    Attending Physician: Ginger Kelley, DO      ______________________________________________________________________  Assessment and Plan:   Principal Problem:    Ventricular fibrillation ([facility] )  Active Problems:    Cardiac arrest ([facility] )    STEMI (ST elevation myocardial infarction) ([facility] )    CAD (coronary artery disease)    Toxic metabolic encephalopathy    STEVIE (acute kidney injury) ([facility] )    Transaminitis    Anxiety  Resolved Problems:    Bandemia        Neuro:   Toxic metabolic encephalopathy-slowly improving, did require precedex drip again overnight, will attempt to wean today  Monitor neuro status closely  Frequent re-orientation  Continue olanzipine  [facility] ICU  Delirium precautions  Acute pain-2/2 chest compressions-cont scheduled tylenol, lidoderm patches, toradol for an additional 24 hours, add low dose oxycodone PRN    CV:   STEMI/Vfib arrest s/p PCI with CRUZ to LAD-continue DAPT with ASA/Brilinta  High dose statin  Continue BB, ACEI  Cardiology following  Ischemic cardiomyopathy-Will need lifevest on D/C and likely ICD  Pulm:   Acute hypoxic resp failure-resolved, encourage good pulmonary hygiene     GI:   Transaminitis-likely 2/2 shock liver-improving    :   STEVIE-resolved, monitor renal indices and urine output  Urinary retention-maintain lizarraga for now    F/E/N:   Monitor lytes and replace as needed    ID:   Follow WBC and fever curve    Heme:   Continue SQ heparin for DVT ppx    Endo:   Hyperglycemia-likely stress response-continue SSI     Msk/Skin:   PT/OT, OOB as able    Disposition:   Continue ICU level of care while on precedex    Code Status: Level 1 - Full Code    Counseling / Coordination of Care  Total Critical Care time spent 32 minutes excluding procedures, teaching and family updates  ______________________________________________________________________    24 Hour Events:   Remains encephalopathic, although is slowly improving    Review of Systems   Unable to perform ROS: Mental status change     ______________________________________________________________________  Physical Exam   Constitutional: He appears well-developed and well-nourished  No distress  HENT:   Head: Normocephalic and atraumatic  Eyes: Pupils are equal, round, and reactive to light  EOM are normal    Neck: Normal range of motion  Neck supple  No JVD present  Cardiovascular: Normal rate, regular rhythm, normal heart sounds and intact distal pulses  No murmur heard  Pulses:       Dorsalis pedis pulses are 2+ on the right side, and 2+ on the left side  Posterior tibial pulses are 2+ on the right side, and 2+ on the left side  Pulmonary/Chest: Effort normal and breath sounds normal  No respiratory distress  Abdominal: Soft  Bowel sounds are normal  He exhibits no distension  There is no tenderness  Musculoskeletal: Normal range of motion  He exhibits no edema  Lymphadenopathy:     He has no cervical adenopathy  Neurological: He is alert  No cranial nerve deficit  GCS eye subscore is 4  GCS verbal subscore is 4  GCS motor subscore is 6    impulsive   Skin: Skin is warm and dry  No rash noted  Nursing note and vitals reviewed  Physical Exam   Constitutional: He appears well-developed and well-nourished  No distress  HENT:   Head: Normocephalic and atraumatic  Eyes: Pupils are equal, round, and reactive to light  EOM are normal    Neck: Normal range of motion  Neck supple  No JVD present  Cardiovascular: Normal rate, regular rhythm, normal heart sounds and intact distal pulses  No murmur heard  Pulses:       Dorsalis pedis pulses are 2+ on the right side, and 2+ on the left side  Posterior tibial pulses are 2+ on the right side, and 2+ on the left side     Pulmonary/Chest: Effort normal and breath sounds normal  No respiratory distress  Abdominal: Soft  Bowel sounds are normal  He exhibits no distension  There is no tenderness  Musculoskeletal: Normal range of motion  He exhibits no edema  Lymphadenopathy:     He has no cervical adenopathy  Neurological: He is alert  No cranial nerve deficit  GCS eye subscore is 4  GCS verbal subscore is 4  GCS motor subscore is 6    impulsive   Skin: Skin is warm and dry  No rash noted  Nursing note and vitals reviewed  ______________________________________________________________________  Vitals:    18 0848 18 0900 18 1000 18 1114   BP: 150/68 166/76  157/85   BP Location:    Right arm   Pulse: 56 60 70 68   Resp:   (!) 39 (!) 36   Temp:    98 4 °F (36 9 °C)   TempSrc:    Oral   SpO2:  94% 96% 94%   Weight:       Height:           Temperature:   Temp (24hrs), Av 1 °F (36 7 °C), Min:97 5 °F (36 4 °C), Max:98 4 °F (36 9 °C)    Current Temperature: 98 4 °F (36 9 °C)  Weights:   IBW: 77 6 kg    Body mass index is 27 48 kg/m²  Weight (last 2 days)     Date/Time   Weight    18 0600  91 9 (202 6)    18 0552  99 7 (219 8)    18 0600  99 2 (218 7)            Hemodynamic Monitoring:  N/A     Non-Invasive/Invasive Ventilation Settings:  Respiratory    Lab Data (Last 4 hours)    None         O2/Vent Data (Last 4 hours)    None              No results found for: PHART, VXA7ESP, PO2ART, EZC4OXB, X2ZKDCNR, BEART, SOURCE  SpO2: SpO2: 94 %  Intake and Outputs:  I/O       701 -  07 -  07 07 -  07    P  O  0      I V  (mL/kg) 1897 7 (19) 2500 7 (27 2) 418 4 (4 6)    IV Piggyback 50      Total Intake(mL/kg) 1947 7 (19 5) 2500 7 (27 2) 418 4 (4 6)    Urine (mL/kg/hr) 1070 (0 4) 1090 (0 5) 200 (0 5)    Total Output 1070 1090 200    Net +877 7 +1410 7 +218 4                 Nutrition:        Diet Orders            Start     Ordered    18 1057  Diet Dysphagia/Modified Consistency; Dysphagia 1-Pureed; Thin Liquid  Diet effective now     Question Answer Comment   Diet Type Dysphagia/Modified Consistency    Dysphagia/Modified Consistency Dysphagia 1-Pureed    Liquid Modifier Thin Liquid    RD to adjust diet per protocol? Yes        11/24/18 1056          Labs:     Results from last 7 days  Lab Units 11/25/18  0518 11/24/18  0509 11/23/18  0449 11/22/18  1516 11/22/18  1240 11/22/18  1226   WBC Thousand/uL 14 32* 13 88* 14 67* 23 53* 31 15*  --    HEMOGLOBIN g/dL 12 9 12 4 12 4 14 6 15 9  --    I STAT HEMOGLOBIN g/dl  --   --   --   --   --  16 7   HEMATOCRIT % 38 0 37 0 37 2 44 6 48 4  --    HEMATOCRIT, ISTAT %  --   --   --   --   --  49   PLATELETS Thousands/uL 208 187 232 323 369  --    NEUTROS PCT %  --  81* 83* 89*  --   --    BANDS PCT %  --   --   --   --  8  --    MONOS PCT %  --  9 8 6  --   --    MONO PCT %  --   --   --   --  3*  --        Results from last 7 days  Lab Units 11/25/18  0518 11/24/18  0509 11/23/18  0449 11/22/18  1514 11/22/18  1240  11/22/18  1226   SODIUM mmol/L 140 139 136 139 139  --   --    POTASSIUM mmol/L 4 4 4 5 5 2 3 8 3 3*  < >  --    CHLORIDE mmol/L 106 106 106 102 101  --   --    CO2 mmol/L 25 24 22 22 18*  --   --    CO2, I-STAT mmol/L  --   --   --   --   --   --  19*   AGAP mmol/L  --   --   --   --   --   --  22*   ANION GAP mmol/L 9 9 8 15* 20*  --   --    BUN mg/dL 26* 22 25 19 20  --   --    CREATININE mg/dL 0 90 0 84 1 01 1 15 1 57*  --   --    CALCIUM mg/dL 8 9 8 6 8 0* 8 8 9 1  --   --    ALT U/L  --   --  254* 374* 428*  --   --    AST U/L  --   --  237* 372* 416*  --   --    ALK PHOS U/L  --   --  69 101 129*  --   --    ALBUMIN g/dL  --   --  3 2* 3 8 4 2  --   --    TOTAL BILIRUBIN mg/dL  --   --  0 50 0 70 0 60  --   --    < > = values in this interval not displayed      Results from last 7 days  Lab Units 11/25/18  0518 11/24/18  0509 11/23/18  0449 11/22/18  1514 11/22/18  1240   MAGNESIUM mg/dL 2 2 2 3 2 3 2 6 3 3*   PHOSPHORUS mg/dL  --   --  2 9 4 2 7 2*        Results from last 7 days  Lab Units 11/22/18  1811 11/22/18  1514 11/22/18  1240   INR  1 36* 2 09* 1 17   PTT seconds  --  84* 38       Results from last 7 days  Lab Units 11/23/18  0955 11/23/18  0449 11/23/18  0018 11/22/18  2019 11/22/18  1609 11/22/18  1514 11/22/18  1240   TROPONIN I ng/mL 27 92* 34 56* 34 45* 33 74* 10 63* 5 20* 1 11*       Results from last 7 days  Lab Units 11/23/18  0449 11/23/18  0018 11/22/18  1815 11/22/18  1519   LACTIC ACID mmol/L 1 7 1 8 2 5* 5 4*     ABG:  Lab Results   Component Value Date    PHART 7 406 11/23/2018    CRS3DFG 30 8 (L) 11/23/2018    PO2ART 134 7 (H) 11/23/2018    XDN5ZCM 18 9 (L) 11/23/2018    BEART -4 7 11/23/2018    SOURCE Line, Arterial 11/23/2018     VBG:  Results from last 7 days  Lab Units 11/23/18  0449   ABG SOURCE  Line, Arterial         No results found for: PEDROTAYLORPRIMO     EKG: NSR  Micro:    Results from last 7 days  Lab Units 11/22/18  1608   BLOOD CULTURE  No Growth at 48 hrs  No Growth at 48 hrs  Allergies:    Allergies   Allergen Reactions    Barley Grass Throat Swelling     Medications:   Scheduled Meds:  Current Facility-Administered Medications:  acetaminophen 650 mg Oral Q6H [facility] Poppy Charles PA-C    aspirin 81 mg Oral Daily Will CATARINA Bernal    atorvastatin 40 mg Oral Daily With Gunner Chris PA-C    bacitracin 1 large application Topical BID Will CATARINA Bernal    busPIRone 15 mg Oral BID Poppy Charles PA-C    chlorhexidine 15 mL [facility] & alf Will CATARINA Bernal    dexmedetomidine 0 1-1 2 mcg/kg/hr Intravenous Titrated LALITHA Kline Last Rate: 1 mcg/kg/hr (11/25/18 7002)   fentanyl citrate (PF) 50 mcg Intravenous Once Poppy Charles PA-C    heparin (porcine) 5,000 Units Subcutaneous [facility] Poppy Charles PA-C    insulin lispro 1-6 Units Subcutaneous Q6H [facility] & al[facility] Will CATARINA Bernal    ketorolac 15 mg Intravenous Q6H [facility] & alf LALITHA De La Cruz    levalbuterol 1 25 mg Nebulization Q6H PRN LALITHA Elder    lidocaine 2 patch Topical Daily Analy Rosario PA-C    metoprolol tartrate 25 mg Oral Q12H [address] [place], [place]    multi-electrolyte 75 mL/hr Intravenous Continuous [place], [place] Last Rate: 75 mL/hr (11/24/18 2340)   nicotine 14 mg Transdermal Daily Shabnam Elizabeth PA-C    OLANZapine 10 mg Oral Q12H LALITHA De La Cruz    oxyCODONE 2 5 mg Oral Q4H PRN LALITHA Elder    ticagrelor 90 mg Oral Q12H [address] Maggy Naqvi MD      Continuous Infusions:  dexmedetomidine 0 1-1 2 mcg/kg/hr Last Rate: 1 mcg/kg/hr (11/25/18 0933)   multi-electrolyte 75 mL/hr Last Rate: 75 mL/hr (11/24/18 2340)     PRN Meds:    levalbuterol 1 25 mg Q6H PRN   oxyCODONE 2 5 mg Q4H PRN     VTE Pharmacologic Prophylaxis: Heparin  VTE Mechanical Prophylaxis: sequential compression device  Invasive lines and devices: Invasive Devices     Peripheral Intravenous Line            Peripheral IV 11/22/18 Left Wrist 2 days    Peripheral IV 11/23/18 Right Forearm 1 day          Drain            Urethral Catheter less than 1 day                     Portions of the record may have been created with voice recognition software  Occasional wrong word or "sound a like" substitutions may have occurred due to the inherent limitations of voice recognition software  Read the chart carefully and recognize, using context, where substitutions have occurred      Barb Ann

## 2018-11-26 ENCOUNTER — APPOINTMENT (INPATIENT)
Dept: RADIOLOGY | Facility: HOSPITAL | Age: 58
DRG: 246 | End: 2018-11-26
Payer: COMMERCIAL

## 2018-11-26 ENCOUNTER — APPOINTMENT (INPATIENT)
Dept: CT IMAGING | Facility: HOSPITAL | Age: 58
DRG: 246 | End: 2018-11-26
Payer: COMMERCIAL

## 2018-11-26 LAB
ABO GROUP BLD: NORMAL
ALBUMIN SERPL BCP-MCNC: 2.9 G/DL (ref 3.5–5)
ALP SERPL-CCNC: 70 U/L (ref 46–116)
ALT SERPL W P-5'-P-CCNC: 92 U/L (ref 12–78)
AMMONIA PLAS-SCNC: 26 UMOL/L (ref 11–35)
ANION GAP SERPL CALCULATED.3IONS-SCNC: 9 MMOL/L (ref 4–13)
AST SERPL W P-5'-P-CCNC: 60 U/L (ref 5–45)
ATRIAL RATE: 75 BPM
BILIRUB DIRECT SERPL-MCNC: 0.37 MG/DL (ref 0–0.2)
BILIRUB SERPL-MCNC: 1.1 MG/DL (ref 0.2–1)
BLD GP AB SCN SERPL QL: NEGATIVE
BUN SERPL-MCNC: 18 MG/DL (ref 5–25)
CALCIUM SERPL-MCNC: 8.7 MG/DL (ref 8.3–10.1)
CHLORIDE SERPL-SCNC: 108 MMOL/L (ref 100–108)
CO2 SERPL-SCNC: 24 MMOL/L (ref 21–32)
CREAT SERPL-MCNC: 0.79 MG/DL (ref 0.6–1.3)
ERYTHROCYTE [DISTWIDTH] IN BLOOD BY AUTOMATED COUNT: 13.3 % (ref 11.6–15.1)
GFR SERPL CREATININE-BSD FRML MDRD: 100 ML/MIN/1.73SQ M
GLUCOSE SERPL-MCNC: 148 MG/DL (ref 65–140)
GLUCOSE SERPL-MCNC: 159 MG/DL (ref 65–140)
GLUCOSE SERPL-MCNC: 177 MG/DL (ref 65–140)
GLUCOSE SERPL-MCNC: 184 MG/DL (ref 65–140)
GLUCOSE SERPL-MCNC: 188 MG/DL (ref 65–140)
HCT VFR BLD AUTO: 34.7 % (ref 36.5–49.3)
HGB BLD-MCNC: 11.8 G/DL (ref 12–17)
MCH RBC QN AUTO: 30.4 PG (ref 26.8–34.3)
MCHC RBC AUTO-ENTMCNC: 34 G/DL (ref 31.4–37.4)
MCV RBC AUTO: 89 FL (ref 82–98)
P AXIS: 34 DEGREES
PLATELET # BLD AUTO: 221 THOUSANDS/UL (ref 149–390)
PMV BLD AUTO: 10.1 FL (ref 8.9–12.7)
POTASSIUM SERPL-SCNC: 3.6 MMOL/L (ref 3.5–5.3)
PR INTERVAL: 156 MS
PROT SERPL-MCNC: 6.4 G/DL (ref 6.4–8.2)
QRS AXIS: 33 DEGREES
QRSD INTERVAL: 96 MS
QT INTERVAL: 376 MS
QTC INTERVAL: 419 MS
RBC # BLD AUTO: 3.88 MILLION/UL (ref 3.88–5.62)
RH BLD: POSITIVE
SODIUM SERPL-SCNC: 141 MMOL/L (ref 136–145)
SPECIMEN EXPIRATION DATE: NORMAL
T WAVE AXIS: 45 DEGREES
VENTRICULAR RATE: 75 BPM
WBC # BLD AUTO: 15.46 THOUSAND/UL (ref 4.31–10.16)

## 2018-11-26 PROCEDURE — 85027 COMPLETE CBC AUTOMATED: CPT | Performed by: NURSE PRACTITIONER

## 2018-11-26 PROCEDURE — 86850 RBC ANTIBODY SCREEN: CPT | Performed by: NURSE PRACTITIONER

## 2018-11-26 PROCEDURE — 82948 REAGENT STRIP/BLOOD GLUCOSE: CPT

## 2018-11-26 PROCEDURE — 99233 SBSQ HOSP IP/OBS HIGH 50: CPT | Performed by: STUDENT IN AN ORGANIZED HEALTH CARE EDUCATION/TRAINING PROGRAM

## 2018-11-26 PROCEDURE — 86901 BLOOD TYPING SEROLOGIC RH(D): CPT | Performed by: NURSE PRACTITIONER

## 2018-11-26 PROCEDURE — 82140 ASSAY OF AMMONIA: CPT | Performed by: NURSE PRACTITIONER

## 2018-11-26 PROCEDURE — 94760 N-INVAS EAR/PLS OXIMETRY 1: CPT

## 2018-11-26 PROCEDURE — 92526 ORAL FUNCTION THERAPY: CPT

## 2018-11-26 PROCEDURE — 80048 BASIC METABOLIC PNL TOTAL CA: CPT | Performed by: NURSE PRACTITIONER

## 2018-11-26 PROCEDURE — 93010 ELECTROCARDIOGRAM REPORT: CPT | Performed by: INTERNAL MEDICINE

## 2018-11-26 PROCEDURE — 93005 ELECTROCARDIOGRAM TRACING: CPT

## 2018-11-26 PROCEDURE — 94669 MECHANICAL CHEST WALL OSCILL: CPT

## 2018-11-26 PROCEDURE — 86900 BLOOD TYPING SEROLOGIC ABO: CPT | Performed by: NURSE PRACTITIONER

## 2018-11-26 PROCEDURE — 94640 AIRWAY INHALATION TREATMENT: CPT

## 2018-11-26 PROCEDURE — 80076 HEPATIC FUNCTION PANEL: CPT | Performed by: NURSE PRACTITIONER

## 2018-11-26 PROCEDURE — 71045 X-RAY EXAM CHEST 1 VIEW: CPT

## 2018-11-26 RX ORDER — SODIUM CHLORIDE FOR INHALATION 0.9 %
VIAL, NEBULIZER (ML) INHALATION
Status: COMPLETED
Start: 2018-11-26 | End: 2018-11-29

## 2018-11-26 RX ORDER — SODIUM CHLORIDE FOR INHALATION 0.9 %
3 VIAL, NEBULIZER (ML) INHALATION
Status: DISCONTINUED | OUTPATIENT
Start: 2018-11-26 | End: 2018-12-02 | Stop reason: HOSPADM

## 2018-11-26 RX ORDER — LEVALBUTEROL 1.25 MG/.5ML
1.25 SOLUTION, CONCENTRATE RESPIRATORY (INHALATION)
Status: DISCONTINUED | OUTPATIENT
Start: 2018-11-26 | End: 2018-12-02 | Stop reason: HOSPADM

## 2018-11-26 RX ORDER — LORAZEPAM 2 MG/ML
0.5 INJECTION INTRAMUSCULAR ONCE
Status: COMPLETED | OUTPATIENT
Start: 2018-11-26 | End: 2018-11-26

## 2018-11-26 RX ADMIN — METRONIDAZOLE 500 MG: 500 INJECTION, SOLUTION INTRAVENOUS at 11:43

## 2018-11-26 RX ADMIN — LEVALBUTEROL HYDROCHLORIDE 1.25 MG: 1.25 SOLUTION, CONCENTRATE RESPIRATORY (INHALATION) at 14:39

## 2018-11-26 RX ADMIN — HEPARIN SODIUM 5000 UNITS: 5000 INJECTION, SOLUTION INTRAVENOUS; SUBCUTANEOUS at 14:30

## 2018-11-26 RX ADMIN — OLANZAPINE 10 MG: 10 TABLET, ORALLY DISINTEGRATING ORAL at 23:29

## 2018-11-26 RX ADMIN — DEXMEDETOMIDINE HYDROCHLORIDE 0.5 MCG/KG/HR: 100 INJECTION, SOLUTION INTRAVENOUS at 12:56

## 2018-11-26 RX ADMIN — BACITRACIN ZINC 1 LARGE APPLICATION: 500 OINTMENT TOPICAL at 17:08

## 2018-11-26 RX ADMIN — ISODIUM CHLORIDE 3 ML: 0.03 SOLUTION RESPIRATORY (INHALATION) at 14:39

## 2018-11-26 RX ADMIN — INSULIN LISPRO 1 UNITS: 100 INJECTION, SOLUTION INTRAVENOUS; SUBCUTANEOUS at 13:38

## 2018-11-26 RX ADMIN — NICOTINE 14 MG: 14 PATCH TRANSDERMAL at 09:30

## 2018-11-26 RX ADMIN — LORAZEPAM 0.5 MG: 2 INJECTION INTRAMUSCULAR; INTRAVENOUS at 01:28

## 2018-11-26 RX ADMIN — BACITRACIN ZINC 1 LARGE APPLICATION: 500 OINTMENT TOPICAL at 10:16

## 2018-11-26 RX ADMIN — INSULIN LISPRO 1 UNITS: 100 INJECTION, SOLUTION INTRAVENOUS; SUBCUTANEOUS at 18:10

## 2018-11-26 RX ADMIN — HEPARIN SODIUM 5000 UNITS: 5000 INJECTION, SOLUTION INTRAVENOUS; SUBCUTANEOUS at 05:41

## 2018-11-26 RX ADMIN — OXYCODONE HYDROCHLORIDE 2.5 MG: 5 TABLET ORAL at 13:45

## 2018-11-26 RX ADMIN — ACETAMINOPHEN 650 MG: 160 SUSPENSION ORAL at 17:08

## 2018-11-26 RX ADMIN — TICAGRELOR 90 MG: 90 TABLET ORAL at 09:16

## 2018-11-26 RX ADMIN — TICAGRELOR 90 MG: 90 TABLET ORAL at 21:20

## 2018-11-26 RX ADMIN — SODIUM CHLORIDE, SODIUM GLUCONATE, SODIUM ACETATE, POTASSIUM CHLORIDE AND MAGNESIUM CHLORIDE 75 ML/HR: 526; 502; 368; 37; 30 INJECTION, SOLUTION INTRAVENOUS at 16:55

## 2018-11-26 RX ADMIN — LEVALBUTEROL 1.25 MG: 1.25 SOLUTION, CONCENTRATE RESPIRATORY (INHALATION) at 09:22

## 2018-11-26 RX ADMIN — OXYCODONE HYDROCHLORIDE 2.5 MG: 5 TABLET ORAL at 20:28

## 2018-11-26 RX ADMIN — SODIUM CHLORIDE, SODIUM GLUCONATE, SODIUM ACETATE, POTASSIUM CHLORIDE AND MAGNESIUM CHLORIDE 75 ML/HR: 526; 502; 368; 37; 30 INJECTION, SOLUTION INTRAVENOUS at 02:38

## 2018-11-26 RX ADMIN — DEXMEDETOMIDINE HYDROCHLORIDE 1 MCG/KG/HR: 100 INJECTION, SOLUTION INTRAVENOUS at 09:12

## 2018-11-26 RX ADMIN — CHLORHEXIDINE GLUCONATE 0.12% ORAL RINSE 15 ML: 1.2 LIQUID ORAL at 21:20

## 2018-11-26 RX ADMIN — ACETAMINOPHEN 650 MG: 160 SUSPENSION ORAL at 12:59

## 2018-11-26 RX ADMIN — OLANZAPINE 10 MG: 10 TABLET, ORALLY DISINTEGRATING ORAL at 10:20

## 2018-11-26 RX ADMIN — DEXMEDETOMIDINE HYDROCHLORIDE 1 MCG/KG/HR: 100 INJECTION, SOLUTION INTRAVENOUS at 07:02

## 2018-11-26 RX ADMIN — DEXMEDETOMIDINE HYDROCHLORIDE 1 MCG/KG/HR: 100 INJECTION, SOLUTION INTRAVENOUS at 04:52

## 2018-11-26 RX ADMIN — BUSPIRONE HYDROCHLORIDE 15 MG: 5 TABLET ORAL at 17:07

## 2018-11-26 RX ADMIN — ISODIUM CHLORIDE 3 ML: 0.03 SOLUTION RESPIRATORY (INHALATION) at 19:02

## 2018-11-26 RX ADMIN — ACETAMINOPHEN 650 MG: 160 SUSPENSION ORAL at 23:29

## 2018-11-26 RX ADMIN — CHLORHEXIDINE GLUCONATE 0.12% ORAL RINSE 15 ML: 1.2 LIQUID ORAL at 10:18

## 2018-11-26 RX ADMIN — DEXMEDETOMIDINE HYDROCHLORIDE 1 MCG/KG/HR: 100 INJECTION, SOLUTION INTRAVENOUS at 02:38

## 2018-11-26 RX ADMIN — BUSPIRONE HYDROCHLORIDE 15 MG: 5 TABLET ORAL at 09:15

## 2018-11-26 RX ADMIN — LIDOCAINE 2 PATCH: 50 PATCH CUTANEOUS at 10:16

## 2018-11-26 RX ADMIN — METRONIDAZOLE 500 MG: 500 INJECTION, SOLUTION INTRAVENOUS at 17:31

## 2018-11-26 RX ADMIN — METOPROLOL TARTRATE 25 MG: 25 TABLET, FILM COATED ORAL at 21:20

## 2018-11-26 RX ADMIN — ATORVASTATIN CALCIUM 40 MG: 40 TABLET, FILM COATED ORAL at 17:07

## 2018-11-26 RX ADMIN — INSULIN LISPRO 1 UNITS: 100 INJECTION, SOLUTION INTRAVENOUS; SUBCUTANEOUS at 05:39

## 2018-11-26 RX ADMIN — LEVALBUTEROL HYDROCHLORIDE 1.25 MG: 1.25 SOLUTION, CONCENTRATE RESPIRATORY (INHALATION) at 19:02

## 2018-11-26 RX ADMIN — DEXMEDETOMIDINE HYDROCHLORIDE 1 MCG/KG/HR: 100 INJECTION, SOLUTION INTRAVENOUS at 00:53

## 2018-11-26 RX ADMIN — HEPARIN SODIUM 5000 UNITS: 5000 INJECTION, SOLUTION INTRAVENOUS; SUBCUTANEOUS at 21:20
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
## 2018-11-26 NOTE — SPEECH THERAPY NOTE
Speech/Language Pathology Progress Note    Patient Name: Francisco Kramer Date: 11/26/2018    Subjective:  "I need to go to work "    Objective:  Pt was seen for dysphagia treatment bedside to assure tolerance of current diet, assess continued need for modified diet/strategies and to assess readiness for safe diet upgrade  Pt arousable but confused (oriented only to self, not to place/mo/year)  Frequently wanted to get OOB (b/l wrist restraints in place)  [place] was assessed with applesauce, nectar thick and thin liquid  He required mod/max re-direction to task  Once calm and attentive, pt able to retrieve, transfer & initiate swallows in a timely manner  No cough, throat clearing, cahnge in BS or vocal quality c puree or NTL  Occasions of throat clearing and change in BS (+rhonchi) & O2 sats noted (occasions of decline in O2 sat from 93 to 91%) andrés when pt took 2 or more sips in sequence  Strategy training attempted (attempted to train pt to use individual and controlled sips with thin liquid) c poor results today  Assessment:  Pt presented with confusion (?delirious vs anoxic vs other) c likely increase in aspiration risk c thin liquid  Plan/Recommendations:  Consider Dysphagia1/puree with nectar thick liquid

## 2018-11-26 NOTE — PROGRESS NOTES
Progress Note - Cardiology   Billy Zhang 66 y o  male MRN: [id number]  Unit/Bed#:  Encounter: [id number]    Assessment:  1  S/P Vfib cardiac arrest  2  S/P STEMI  3  S/P CRUZ to LAD  4  Ischemic cardiomyopathy  5  Post SCA encephalopathy    Plan:  1  Stable cardiac status  2  Will need ICD implant prior to discharge  Subjective/Objective   Chief Complaint: Agitated    Subjective: Agitated    Objective: Delerious    Vitals: /73 (BP Location: Right arm)   Pulse 79   Temp 100 1 °F (37 8 °C) (Oral)   Resp (!) 32   Ht 6' (1 829 m)   Wt 91 9 kg (202 lb 9 6 oz)   SpO2 99%   BMI 27 48 kg/m²   Vitals:    11/24/18 0552 11/25/18 0600   Weight: 99 7 kg (219 lb 12 8 oz) 91 9 kg (202 lb 9 6 oz)     Orthostatic Blood Pressures      Most Recent Value   Blood Pressure  154/73 filed at 11/26/2018 0700   Patient Position - Orthostatic VS  Lying filed at 11/26/2018 0700            Intake/Output Summary (Last 24 hours) at 11/26/18 0944  Last data filed at 11/26/18 0400   Gross per 24 hour   Intake          2176 52 ml   Output             1600 ml   Net           576 52 ml       Invasive Devices     Peripheral Intravenous Line            Peripheral IV 11/25/18 Left Antecubital less than 1 day    Peripheral IV 11/25/18 Right Forearm less than 1 day          Drain            Urethral Catheter 1 day                Review of Systems: Unobtainable    Physical Exam:   Cardiovascular: Normal rate, regular rhythm and normal heart sounds     No murmur heard    Pulmonary/Chest: Breath sounds normal      Lab Results:   CBC with diff:   Results from last 7 days  Lab Units 11/26/18  0657   WBC Thousand/uL 15 46*   RBC Million/uL 3 88   HEMOGLOBIN g/dL 11 8*   HEMATOCRIT % 34 7*   MCV fL 89   MCH pg 30 4   MCHC g/dL 34 0   RDW % 13 3   MPV fL 10 1   PLATELETS Thousands/uL 221     CMP:   Results from last 7 days  Lab Units 11/26/18  0657  11/23/18  0449  11/22/18  1226   SODIUM mmol/L 141  < > 136  < >  --    POTASSIUM mmol/L 3 6  < > 5 2  < >  --    CHLORIDE mmol/L 108  < > 106  < >  --    CO2 mmol/L 24  < > 22  < >  --    CO2, I-STAT mmol/L  --   --   --   --  19*   BUN mg/dL 18  < > 25  < >  --    CREATININE mg/dL 0 79  < > 1 01  < >  --    GLUCOSE, ISTAT mg/dl  --   --   --   --  332*   CALCIUM mg/dL 8 7  < > 8 0*  < >  --    AST U/L  --   --  237*  < >  --    ALT U/L  --   --  254*  < >  --    ALK PHOS U/L  --   --  69  < >  --    EGFR ml/min/1 73sq m 100  < > 83  < > 57   < > = values in this interval not displayed  Troponin:   0  Lab Value Date/Time   TROPONINI 27 92 (H) 11/23/2018 0955   TROPONINI 34 56 (H) 11/23/2018 0449   TROPONINI 34 45 (H) 11/23/2018 0018   TROPONINI 33 74 (H) 11/22/2018 2019   TROPONINI 10 63 (H) 11/22/2018 1609   TROPONINI 5 20 (H) 11/22/2018 1514   TROPONINI 1 11 (H) 11/22/2018 1240     BNP:   Results from last 7 days  Lab Units 11/26/18  0657  11/22/18  1226   POTASSIUM mmol/L 3 6  < >  --    CHLORIDE mmol/L 108  < >  --    CO2 mmol/L 24  < >  --    CO2, I-STAT mmol/L  --   --  19*   BUN mg/dL 18  < >  --    CREATININE mg/dL 0 79  < >  --    GLUCOSE, ISTAT mg/dl  --   --  332*   CALCIUM mg/dL 8 7  < >  --    EGFR ml/min/1 73sq m 100  < > 57   < > = values in this interval not displayed  Coags:   Results from last 7 days  Lab Units 11/22/18  1811 11/22/18  1514   PTT seconds  --  84*   INR  1 36* 2 09*     TSH:   Results from last 7 days  Lab Units 11/22/18  1514   TSH 3RD GENERATON uIU/mL 5 993*     Magnesium:   Results from last 7 days  Lab Units 11/25/18  0518   MAGNESIUM mg/dL 2 2     Lipid Profile:   Results from last 7 days  Lab Units 11/22/18  1514   HDL mg/dL 37*   LDL CALC mg/dL 81   TRIGLYCERIDES mg/dL 117     Imaging: I have personally reviewed pertinent reports  EKG:   VTE Pharmacologic Prophylaxis:   VTE Mechanical Prophylaxis:     Counseling / Coordination of Care  Total time spent today 30 minutes   Greater than 50% of total time was spent with the patient and / or family counseling and / or coordination of care   A description of the counseling / coordination of care: 30

## 2018-11-26 NOTE — PROGRESS NOTES
Progress Note - Cardiology   Billy Zhang 66 y o  male MRN: [id number]  Unit/Bed#:  Encounter: [id number]    Assessment:  1  S/P Vfib cardiac arrest  2  S/P STEMI  3  S/P CRUZ to LAD  4  Ischemic cardiomyopathy  5  Post arrest encephalopathy    Plan:  1  Stable cardiac status  2  Will need ICD implant prior to discharge  Subjective/Objective   Chief Complaint: None    Subjective: No chestpain    Objective:Awake and alert, delusional    Vitals: /84 (BP Location: Right arm)   Pulse 69   Temp 99 3 °F (37 4 °C) (Oral)   Resp 15   Ht 6' (1 829 m)   Wt 91 9 kg (202 lb 9 6 oz)   SpO2 97%   BMI 27 48 kg/m²   Vitals:    11/24/18 0552 11/25/18 0600   Weight: 99 7 kg (219 lb 12 8 oz) 91 9 kg (202 lb 9 6 oz)     Orthostatic Blood Pressures      Most Recent Value   Blood Pressure  156/84 filed at 11/26/2018 0300   Patient Position - Orthostatic VS  Lying filed at 11/26/2018 0300            Intake/Output Summary (Last 24 hours) at 11/26/18 0739  Last data filed at 11/26/18 0400   Gross per 24 hour   Intake          2176 52 ml   Output             1600 ml   Net           576 52 ml       Invasive Devices     Peripheral Intravenous Line            Peripheral IV 11/25/18 Left Antecubital less than 1 day    Peripheral IV 11/25/18 Right Forearm less than 1 day          Drain            Urethral Catheter 1 day                Review of Systems: Cardiovascular ROS: no chest pain or dyspnea on exertion    Physical Exam:   Cardiovascular: Normal rate, regular rhythm and normal heart sounds     No murmur heard    Pulmonary/Chest: Breath sounds normal      Lab Results:   Troponin:   0  Lab Value Date/Time   TROPONINI 27 92 (H) 11/23/2018 0955   TROPONINI 34 56 (H) 11/23/2018 0449   TROPONINI 34 45 (H) 11/23/2018 0018   TROPONINI 33 74 (H) 11/22/2018 2019   TROPONINI 10 63 (H) 11/22/2018 1609   TROPONINI 5 20 (H) 11/22/2018 1514   TROPONINI 1 11 (H) 11/22/2018 1240     Imaging: I have personally reviewed pertinent reports  EKG:   VTE Pharmacologic Prophylaxis:   VTE Mechanical Prophylaxis:     Counseling / Coordination of Care  Total time spent today 30 minutes  Greater than 50% of total time was spent with the patient and / or family counseling and / or coordination of care   A description of the counseling / coordination of care: 30

## 2018-11-26 NOTE — RESPIRATORY THERAPY NOTE
RT Protocol Note  Billy Zhang 66 y o  male MRN: [id number]  Unit/Bed#:  Encounter: [id number]    Assessment    Principal Problem:    Ventricular fibrillation (HCC)  Active Problems:    Anxiety    STEMI (ST elevation myocardial infarction) ([facility] )    CAD (coronary artery disease)    Transaminitis    STEVIE (acute kidney injury) ([facility] )    Cardiac arrest ([facility] 75 )    Toxic metabolic encephalopathy      Home Pulmonary Medications:  n/a       History reviewed  No pertinent past medical history  Social History     Social History    Marital status: /Civil Union     Spouse name: N/A    Number of children: N/A    Years of education: N/A     Social History Main Topics    Smoking status: Current Every Day Smoker    Smokeless tobacco: None    Alcohol use No    Drug use: No    Sexual activity: Not Asked     Other Topics Concern    None     Social History Narrative    None       Subjective    Subjective Data: patient with periods of lethargy and occasional ability to follow commands due to combativeness and refusal to follow simple commands  Objective    Physical Exam:   Assessment Type: During-treatment  General Appearance: Awake  Respiratory Pattern: Normal, Shallow, Spontaneous  Chest Assessment: Chest expansion symmetrical  Bilateral Breath Sounds: Diminished, Rhonchi, Scattered  Cough: Productive (assisted)  Suction: Oral  O2 Device: nasal cannula    Vitals:  Blood pressure 166/74, pulse 81, temperature 99 6 °F (37 6 °C), temperature source Oral, resp  rate (!) 29, height 6' (1 829 m), weight 91 9 kg (202 lb 9 6 oz), SpO2 93 %        Results from last 7 days  Lab Units 11/23/18  0449  11/22/18  1529   PH ART  7 406  < > 7 236*   PCO2 ART mm Hg 30 8*  < > 41 7   PO2 ART mm Hg 134 7*  < > 153 9*   HCO3 ART mmol/L 18 9*  < > 17 3*   BASE EXC ART mmol/L -4 7  < > -9 7   O2 CONTENT ART mL/dL 17 8  < > 21 0   O2 HGB, ARTERIAL % 97 9*  < > 97 2*   ABG SOURCE  Line, Arterial  < > Radial, Right   RADHA TEST --   --  Yes   < > = values in this interval not displayed  Imaging and other studies: I have personally reviewed pertinent reports        O2 Device: nasal cannula     Plan    Respiratory Plan: No distress/Pulmonary history  Airway Clearance Plan: Percussive Vest     Resp Comments: airway clearance protocol completed at this time patient has deminshed b s  with loud rhonchi and poor cough, unable to achieve minimal VT with I S  and therefore would benefit from highg frequency chest wall oscillation
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
## 2018-11-27 LAB
ALBUMIN SERPL BCP-MCNC: 2.1 G/DL (ref 3.5–5)
ALP SERPL-CCNC: 56 U/L (ref 46–116)
ALT SERPL W P-5'-P-CCNC: 58 U/L (ref 12–78)
AMMONIA PLAS-SCNC: 12 UMOL/L (ref 11–35)
ANION GAP SERPL CALCULATED.3IONS-SCNC: 15 MMOL/L (ref 4–13)
AST SERPL W P-5'-P-CCNC: 33 U/L (ref 5–45)
BACTERIA BLD CULT: NORMAL
BACTERIA BLD CULT: NORMAL
BASOPHILS # BLD AUTO: 0.03 THOUSANDS/ΜL (ref 0–0.1)
BASOPHILS NFR BLD AUTO: 0 % (ref 0–1)
BILIRUB SERPL-MCNC: 0.9 MG/DL (ref 0.2–1)
BUN SERPL-MCNC: 9 MG/DL (ref 5–25)
CALCIUM SERPL-MCNC: 7 MG/DL (ref 8.3–10.1)
CHLORIDE SERPL-SCNC: 106 MMOL/L (ref 100–108)
CO2 SERPL-SCNC: 21 MMOL/L (ref 21–32)
CREAT SERPL-MCNC: 0.67 MG/DL (ref 0.6–1.3)
EOSINOPHIL # BLD AUTO: 0.1 THOUSAND/ΜL (ref 0–0.61)
EOSINOPHIL NFR BLD AUTO: 1 % (ref 0–6)
ERYTHROCYTE [DISTWIDTH] IN BLOOD BY AUTOMATED COUNT: 13.5 % (ref 11.6–15.1)
GFR SERPL CREATININE-BSD FRML MDRD: 107 ML/MIN/1.73SQ M
GLUCOSE SERPL-MCNC: 121 MG/DL (ref 65–140)
GLUCOSE SERPL-MCNC: 129 MG/DL (ref 65–140)
GLUCOSE SERPL-MCNC: 159 MG/DL (ref 65–140)
GLUCOSE SERPL-MCNC: 193 MG/DL (ref 65–140)
GLUCOSE SERPL-MCNC: 225 MG/DL (ref 65–140)
HCT VFR BLD AUTO: 30.2 % (ref 36.5–49.3)
HGB BLD-MCNC: 10.2 G/DL (ref 12–17)
IMM GRANULOCYTES # BLD AUTO: 0.07 THOUSAND/UL (ref 0–0.2)
IMM GRANULOCYTES NFR BLD AUTO: 1 % (ref 0–2)
LYMPHOCYTES # BLD AUTO: 1.11 THOUSANDS/ΜL (ref 0.6–4.47)
LYMPHOCYTES NFR BLD AUTO: 9 % (ref 14–44)
MAGNESIUM SERPL-MCNC: 2.4 MG/DL (ref 1.6–2.6)
MCH RBC QN AUTO: 30.4 PG (ref 26.8–34.3)
MCHC RBC AUTO-ENTMCNC: 33.8 G/DL (ref 31.4–37.4)
MCV RBC AUTO: 90 FL (ref 82–98)
MONOCYTES # BLD AUTO: 1.18 THOUSAND/ΜL (ref 0.17–1.22)
MONOCYTES NFR BLD AUTO: 10 % (ref 4–12)
NEUTROPHILS # BLD AUTO: 9.69 THOUSANDS/ΜL (ref 1.85–7.62)
NEUTS SEG NFR BLD AUTO: 79 % (ref 43–75)
NRBC BLD AUTO-RTO: 0 /100 WBCS
PHOSPHATE SERPL-MCNC: 2.3 MG/DL (ref 2.7–4.5)
PLATELET # BLD AUTO: 245 THOUSANDS/UL (ref 149–390)
PMV BLD AUTO: 10 FL (ref 8.9–12.7)
POTASSIUM SERPL-SCNC: 3.6 MMOL/L (ref 3.5–5.3)
PROCALCITONIN SERPL-MCNC: 0.58 NG/ML
PROT SERPL-MCNC: 5.2 G/DL (ref 6.4–8.2)
RBC # BLD AUTO: 3.36 MILLION/UL (ref 3.88–5.62)
SODIUM SERPL-SCNC: 142 MMOL/L (ref 136–145)
WBC # BLD AUTO: 12.18 THOUSAND/UL (ref 4.31–10.16)

## 2018-11-27 PROCEDURE — 94760 N-INVAS EAR/PLS OXIMETRY 1: CPT

## 2018-11-27 PROCEDURE — 84100 ASSAY OF PHOSPHORUS: CPT | Performed by: NURSE PRACTITIONER

## 2018-11-27 PROCEDURE — 80053 COMPREHEN METABOLIC PANEL: CPT | Performed by: NURSE PRACTITIONER

## 2018-11-27 PROCEDURE — 83735 ASSAY OF MAGNESIUM: CPT | Performed by: NURSE PRACTITIONER

## 2018-11-27 PROCEDURE — 85025 COMPLETE CBC W/AUTO DIFF WBC: CPT | Performed by: NURSE PRACTITIONER

## 2018-11-27 PROCEDURE — 99233 SBSQ HOSP IP/OBS HIGH 50: CPT | Performed by: STUDENT IN AN ORGANIZED HEALTH CARE EDUCATION/TRAINING PROGRAM

## 2018-11-27 PROCEDURE — 92526 ORAL FUNCTION THERAPY: CPT

## 2018-11-27 PROCEDURE — 94669 MECHANICAL CHEST WALL OSCILL: CPT

## 2018-11-27 PROCEDURE — 82140 ASSAY OF AMMONIA: CPT | Performed by: NURSE PRACTITIONER

## 2018-11-27 PROCEDURE — 84145 PROCALCITONIN (PCT): CPT | Performed by: STUDENT IN AN ORGANIZED HEALTH CARE EDUCATION/TRAINING PROGRAM

## 2018-11-27 PROCEDURE — 94640 AIRWAY INHALATION TREATMENT: CPT

## 2018-11-27 PROCEDURE — 82948 REAGENT STRIP/BLOOD GLUCOSE: CPT

## 2018-11-27 RX ORDER — FUROSEMIDE 10 MG/ML
20 INJECTION INTRAMUSCULAR; INTRAVENOUS ONCE
Status: COMPLETED | OUTPATIENT
Start: 2018-11-27 | End: 2018-11-27

## 2018-11-27 RX ORDER — ACETAMINOPHEN 325 MG/1
975 TABLET ORAL EVERY 6 HOURS SCHEDULED
Status: DISCONTINUED | OUTPATIENT
Start: 2018-11-27 | End: 2018-11-28

## 2018-11-27 RX ORDER — LANOLIN ALCOHOL/MO/W.PET/CERES
6 CREAM (GRAM) TOPICAL
Status: DISCONTINUED | OUTPATIENT
Start: 2018-11-27 | End: 2018-12-02 | Stop reason: HOSPADM

## 2018-11-27 RX ORDER — OLANZAPINE 10 MG/1
10 TABLET, ORALLY DISINTEGRATING ORAL
Status: DISCONTINUED | OUTPATIENT
Start: 2018-11-27 | End: 2018-12-02 | Stop reason: HOSPADM

## 2018-11-27 RX ADMIN — CHLORHEXIDINE GLUCONATE 0.12% ORAL RINSE 15 ML: 1.2 LIQUID ORAL at 09:15

## 2018-11-27 RX ADMIN — METOPROLOL TARTRATE 25 MG: 25 TABLET, FILM COATED ORAL at 09:15

## 2018-11-27 RX ADMIN — INSULIN LISPRO 2 UNITS: 100 INJECTION, SOLUTION INTRAVENOUS; SUBCUTANEOUS at 14:10

## 2018-11-27 RX ADMIN — ISODIUM CHLORIDE 3 ML: 0.03 SOLUTION RESPIRATORY (INHALATION) at 19:52

## 2018-11-27 RX ADMIN — MELATONIN 6 MG: 3 TAB ORAL at 22:40

## 2018-11-27 RX ADMIN — BUSPIRONE HYDROCHLORIDE 15 MG: 5 TABLET ORAL at 17:55

## 2018-11-27 RX ADMIN — BACITRACIN ZINC 1 LARGE APPLICATION: 500 OINTMENT TOPICAL at 09:15

## 2018-11-27 RX ADMIN — ACETAMINOPHEN 975 MG: 325 TABLET, FILM COATED ORAL at 12:36

## 2018-11-27 RX ADMIN — OLANZAPINE 10 MG: 10 TABLET, ORALLY DISINTEGRATING ORAL at 21:13

## 2018-11-27 RX ADMIN — ISODIUM CHLORIDE 3 ML: 0.03 SOLUTION RESPIRATORY (INHALATION) at 14:50

## 2018-11-27 RX ADMIN — SODIUM CHLORIDE, SODIUM GLUCONATE, SODIUM ACETATE, POTASSIUM CHLORIDE AND MAGNESIUM CHLORIDE 75 ML/HR: 526; 502; 368; 37; 30 INJECTION, SOLUTION INTRAVENOUS at 08:39

## 2018-11-27 RX ADMIN — ASPIRIN 81 MG 81 MG: 81 TABLET ORAL at 09:15

## 2018-11-27 RX ADMIN — METOPROLOL TARTRATE 25 MG: 25 TABLET, FILM COATED ORAL at 21:13

## 2018-11-27 RX ADMIN — METRONIDAZOLE 500 MG: 500 INJECTION, SOLUTION INTRAVENOUS at 17:55

## 2018-11-27 RX ADMIN — BUSPIRONE HYDROCHLORIDE 15 MG: 5 TABLET ORAL at 12:30

## 2018-11-27 RX ADMIN — ACETAMINOPHEN 650 MG: 160 SUSPENSION ORAL at 05:32

## 2018-11-27 RX ADMIN — INSULIN LISPRO 2 UNITS: 100 INJECTION, SOLUTION INTRAVENOUS; SUBCUTANEOUS at 18:15

## 2018-11-27 RX ADMIN — METRONIDAZOLE 500 MG: 500 INJECTION, SOLUTION INTRAVENOUS at 09:59

## 2018-11-27 RX ADMIN — LEVALBUTEROL HYDROCHLORIDE 1.25 MG: 1.25 SOLUTION, CONCENTRATE RESPIRATORY (INHALATION) at 14:49

## 2018-11-27 RX ADMIN — CHLORHEXIDINE GLUCONATE 0.12% ORAL RINSE 15 ML: 1.2 LIQUID ORAL at 21:13

## 2018-11-27 RX ADMIN — ISODIUM CHLORIDE 3 ML: 0.03 SOLUTION RESPIRATORY (INHALATION) at 08:17

## 2018-11-27 RX ADMIN — NICOTINE 14 MG: 14 PATCH TRANSDERMAL at 09:14

## 2018-11-27 RX ADMIN — HEPARIN SODIUM 5000 UNITS: 5000 INJECTION, SOLUTION INTRAVENOUS; SUBCUTANEOUS at 05:32

## 2018-11-27 RX ADMIN — HEPARIN SODIUM 5000 UNITS: 5000 INJECTION, SOLUTION INTRAVENOUS; SUBCUTANEOUS at 14:13

## 2018-11-27 RX ADMIN — ACETAMINOPHEN 975 MG: 325 TABLET, FILM COATED ORAL at 17:53

## 2018-11-27 RX ADMIN — FUROSEMIDE 20 MG: 10 INJECTION, SOLUTION INTRAMUSCULAR; INTRAVENOUS at 10:43

## 2018-11-27 RX ADMIN — LEVALBUTEROL HYDROCHLORIDE 1.25 MG: 1.25 SOLUTION, CONCENTRATE RESPIRATORY (INHALATION) at 08:17

## 2018-11-27 RX ADMIN — TICAGRELOR 90 MG: 90 TABLET ORAL at 09:15

## 2018-11-27 RX ADMIN — OXYCODONE HYDROCHLORIDE 2.5 MG: 5 TABLET ORAL at 22:46

## 2018-11-27 RX ADMIN — HEPARIN SODIUM 5000 UNITS: 5000 INJECTION, SOLUTION INTRAVENOUS; SUBCUTANEOUS at 21:13

## 2018-11-27 RX ADMIN — LEVALBUTEROL HYDROCHLORIDE 1.25 MG: 1.25 SOLUTION, CONCENTRATE RESPIRATORY (INHALATION) at 19:53

## 2018-11-27 RX ADMIN — METRONIDAZOLE 500 MG: 500 INJECTION, SOLUTION INTRAVENOUS at 03:16

## 2018-11-27 RX ADMIN — TICAGRELOR 90 MG: 90 TABLET ORAL at 21:13

## 2018-11-27 RX ADMIN — OXYCODONE HYDROCHLORIDE 2.5 MG: 5 TABLET ORAL at 05:32

## 2018-11-27 RX ADMIN — BACITRACIN ZINC 1 LARGE APPLICATION: 500 OINTMENT TOPICAL at 17:55

## 2018-11-27 RX ADMIN — LIDOCAINE 2 PATCH: 50 PATCH CUTANEOUS at 09:14

## 2018-11-27 RX ADMIN — INSULIN LISPRO 1 UNITS: 100 INJECTION, SOLUTION INTRAVENOUS; SUBCUTANEOUS at 01:20

## 2018-11-27 RX ADMIN — LISINOPRIL 5 MG: 5 TABLET ORAL at 09:15

## 2018-11-27 RX ADMIN — ATORVASTATIN CALCIUM 40 MG: 40 TABLET, FILM COATED ORAL at 17:53

## 2018-11-27 NOTE — PLAN OF CARE
Problem: SLP ADULT - SWALLOWING, IMPAIRED  Goal: Advance to least restrictive diet without signs or symptoms of aspiration for planned discharge setting  See evaluation for individualized goals    Outcome: Progressing  Consider advance to level 3 dysphagia/dental soft diet and thin liquid

## 2018-11-27 NOTE — PROGRESS NOTES
Progress Note - Critical Care   Billy Zhang 66 y o  male MRN: [id number]  Unit/Bed#:  Encounter: [id number]    Attending Physician: Cheri Toscano MD      ______________________________________________________________________  Assessment and Plan:   Principal Problem:    Ventricular fibrillation [facility])  Active Problems:    Cardiac arrest ([facility] 75 )    STEMI (ST elevation myocardial infarction) ([facility] )    CAD (coronary artery disease)    Toxic metabolic encephalopathy    STEVIE (acute kidney injury) ([facility] 75 )    Transaminitis    Anxiety  Resolved Problems:    Bandemia        Neuro:  Encephalopathy improving, likely with a component of delirium versus hypoxemic injury  Continue Zyprexa, however being mindful of QTC  Will consider MRI if does not significantly improve  Encourage good sleep hygiene  Lidoderm patches and scheduled Tylenol for pain  P r n  Oxycodone      CV:  Continue zan with ASA/Brilinta for STEMI/VFib arrest status post PCI with CRUZ to the LAD  Continue high-dose statin, beta blockers, Ace inhibitors  Cardiology following    will require AICD implantation prior to discharge secondary to ischemic cardiomyopathy, with ejection fraction 20-25% and severe hypokinesis  Occasional PVCs but no sustained V-tach overnight      Pulm:  Encourage incentive spirometry and pulmonary hygiene  Mobilize out of bed as tolerated  Supplemental oxygen to maintain saturations greater than 92%  Bronchodilators as needed  Nicotine patch for tobacco cessation      GI:  Transaminitis likely secondary to shock liver, improving  Continue to monitor      :  Continue to monitor BUN, creatinine, and urinary output  STEVIE resolved      F/E/N: Continue to monitor electrolytes and replete as needed  Goal to keep magnesium >2 0 and potassium >4 0  Tolerating dysphagia diet      ID:  No indication for antimicrobial therapy  Continue monitor fever and leukocyte curves      Heme:  Hemoglobin and platelet count stable  Continue to monitor      Endo:  Routine glucoses and cover with insulin per protocol as needed  Msk/Skin:  PT/OT  Mobilize out of bed as tolerated  Meticulous skin care      Disposition:   May downgrade from ICU level of care if delirium remains controlled off Precedex  Code Status: Level 1 - Full Code    Counseling / Coordination of Care  Total time spent today 35 minutes  Greater than 50% of total time was spent with the patient and / or family counseling and / or coordination of care   ______________________________________________________________________    24 Hour Events:   Delirium somewhat improving  Patient less aggressive  Precedex weaned off overnight  Patient offers no complaints  Plan for AICD prior to discharge secondary to ischemic cardiomyopathy with EF 20-25%      ______________________________________________________________________    Physical Exam:   GEN:  Ill-appearing, appears stated age, no acute distress  HEENT:  Sclera anicteric, mucous membranes pink and moist, conjunctiva pink, no leatha/rhinorrhea  Neck:  No lymphadenopathy, normal ROM, supple, no bruits  CV :  S1S2, no murmurs, rubs or gallops  Intact distal pulses  No JVD  Resp:  Lungs diminished throughout  No subq air or crepitus  Symmetrical expansion  No cough noted  GI :  Abd soft, nontender, no guarding/rebound, nondistended, normoactive bowel sounds X4 quads, no organomegaly appreciated  Neuro:  CN II-XII grossly intact, nonfocal exam, speech clear, GCS confused  Psych:  Delirious      ______________________________________________________________________  Blood pressure 131/65, pulse 86, temperature 99 °F (37 2 °C), temperature source Oral, resp  rate 20, height 6' (1 829 m), weight 91 9 kg (202 lb 9 6 oz), SpO2 93 %        Temperature:   Temp (24hrs), Av 2 °F (37 3 °C), Min:98 5 °F (36 9 °C), Max:100 1 °F (37 8 °C)    Current Temperature: 99 °F (37 2 °C)  Weights:   IBW: 77 6 kg    Body mass index is 27 48 kg/m²  Weight (last 2 days)     Date/Time   Weight    11/25/18 0600  91 9 (202 6)              Non-Invasive/Invasive Ventilation Settings:  Respiratory    Lab Data (Last 4 hours)    None         O2/Vent Data (Last 4 hours)    None              No results found for: PHART, JNO6SVO, PO2ART, OSM9GEG, T0CEAWFB, BEART, SOURCE  SpO2: SpO2: 93 %  Intake and Outputs:  I/O       11/25 0701 - 11/26 0700 11/26 0701 - 11/27 0700    P  O   120    I V  (mL/kg) 2176 5 (23 7) 1579 3 (17 2)    IV Piggyback  256 7    Total Intake(mL/kg) 2176 5 (23 7) 1956 (21 3)    Urine (mL/kg/hr) 1600 (0 7) 3250 (1 5)    Total Output 1600 3250    Net +576 5 -1294                Nutrition:        Diet Orders            Start     Ordered    11/24/18 1057  Diet Dysphagia/Modified Consistency; Dysphagia 1-Pureed; Thin Liquid  Diet effective now     Question Answer Comment   Diet Type Dysphagia/Modified Consistency    Dysphagia/Modified Consistency Dysphagia 1-Pureed    Liquid Modifier Thin Liquid    RD to adjust diet per protocol? Yes        11/24/18 1056          Labs:   Pending      Imaging:  None today  EKG:  Sinus rhythm on cardiac monitor  Micro:  Lab Results   Component Value Date    BLOODCX No Growth After 4 Days  11/22/2018    BLOODCX No Growth After 4 Days  11/22/2018     Allergies:    Allergies   Allergen Reactions    Barley Grass Throat Swelling     Medications:   Scheduled Meds:  Current Facility-Administered Medications:  acetaminophen 650 mg Oral Q6H [facility] Poppy Charles PA-C    aspirin 81 mg Oral Daily Johan Armstrong PA-C    atorvastatin 40 mg Oral Daily With [place] CATARINA Chris    bacitracin 1 large application Topical BID Johan Armstrong PA-C    busPIRone 15 mg Oral BID Poppy Charles PA-C    chlorhexidine 15 mL [facility] Johan Armstrong PA-C    dexmedetomidine 0 1-1 2 mcg/kg/hr Intravenous Titrated LALITHA Kline Last Rate: Stopped (11/26/18 1630)   fentanyl citrate (PF) 50 mcg Intravenous Once Greg Berg CATARINA Freed    heparin (porcine) 5,000 Units Subcutaneous [facility] Markel Taylor PA-C    insulin lispro 1-6 Units Subcutaneous Q6H [address] Romelia Smith PA-C    levalbuterol 1 25 mg Nebulization TID Nia Nieto MD    lidocaine 2 patch Topical Daily Markel Taylor PA-C    lisinopril 5 mg Oral Daily LALITHA De La Cruz    metoprolol tartrate 25 mg Oral Q12H [address] Romelia Smith PA-C    metroNIDAZOLE 500 mg Intravenous Q8H [place], CRNP Last Rate: 500 mg (11/27/18 0316)   multi-electrolyte 75 mL/hr Intravenous Continuous Romelia Smith PA-C Last Rate: 75 mL/hr (11/26/18 1655)   nicotine 14 mg Transdermal Daily Romelia Smith PA-C    OLANZapine 10 mg Oral Q12H LALITHA De La Cruz    oxyCODONE 2 5 mg Oral Q4H PRN LALITHA De La Cruz    sodium chloride 3 mL Nebulization TID Nia Nieto MD    ticagrelor 90 mg Oral Q12H [address] Dennie Kung, MD      Continuous Infusions:  dexmedetomidine 0 1-1 2 mcg/kg/hr Last Rate: Stopped (11/26/18 1630)   multi-electrolyte 75 mL/hr Last Rate: 75 mL/hr (11/26/18 1655)     PRN Meds:    oxyCODONE 2 5 mg Q4H PRN     VTE Pharmacologic Prophylaxis: Heparin  VTE Mechanical Prophylaxis: sequential compression device  Invasive lines and devices: Invasive Devices     Peripheral Intravenous Line            Peripheral IV 11/25/18 Left Antecubital 1 day    Peripheral IV 11/25/18 Right Forearm 1 day          Drain            Urethral Catheter 2 days                     Portions of the record may have been created with voice recognition software  Occasional wrong word or "sound a like" substitutions may have occurred due to the inherent limitations of voice recognition software  Read the chart carefully and recognize, using context, where substitutions have occurred      LALITHA Sosa

## 2018-11-27 NOTE — SPEECH THERAPY NOTE
Pt seen for f/u re: swallowing (and cognition)  Pt was alert, OOB in chair,   In hospitall" but unable to state why  Orientation information and salient recent events reviewed (cog stim provided to enhance recall & performance in ADLs)  Pt was assessed c pureed peaches and thin liquid (more more alert & hopefully safer with thin liquid)  He refused other foods ("I'll try chewy foods when I have my dentures in")  Pt able to retrieve materials via tsp/cup/straw  He took liquids by individual sips  Bolus formation & transfer appeared functional   Swallowing initiation appeared prompt  Laryngeal rise was good by palpation  No coughing, throat clearing, change in vocal quality or BS c puree or individual sips of liquid  Recommendation: pureed food, thin liquid  PS Note:  After session,  pt asked that I order "more regular food" for lunch and he agreed to placement of dentures    Tray ordered & f/u session planned for pm

## 2018-11-27 NOTE — PLAN OF CARE
Problem: Nutrition/Hydration-ADULT  Goal: Nutrient/Hydration intake appropriate for improving, restoring or maintaining nutritional needs  Monitor and assess patient's nutrition/hydration status for malnutrition (ex- brittle hair, bruises, dry skin, pale skin and conjunctiva, muscle wasting, smooth red tongue, and disorientation)  Collaborate with interdisciplinary team and initiate plan and interventions as ordered  Monitor patient's weight and dietary intake as ordered or per policy  Utilize nutrition screening tool and intervene per policy  Determine patient's food preferences and provide high-protein, high-caloric foods as appropriate       INTERVENTIONS:  - Monitor oral intake, urinary output, labs, and treatment plans  - Assess nutrition and hydration status and recommend course of action  - Evaluate amount of meals eaten  - Assist patient with eating if necessary   - Allow adequate time for meals  - Recommend/ encourage appropriate diets, oral nutritional supplements, and vitamin/mineral supplements  - Order, calculate, and assess calorie counts as needed  - Recommend, monitor, and adjust tube feedings based on assessed needs  - Assess need for intravenous fluids  - Provide Heart Healthy Consistent CHO  nutrition/hydration education as appropriate  - Include patient/family/caregiver in decisions related to nutrition           Outcome: Progressing
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
## 2018-11-28 LAB
ALBUMIN SERPL BCP-MCNC: 2.7 G/DL (ref 3.5–5)
ALP SERPL-CCNC: 69 U/L (ref 46–116)
ALT SERPL W P-5'-P-CCNC: 62 U/L (ref 12–78)
ANION GAP SERPL CALCULATED.3IONS-SCNC: 13 MMOL/L (ref 4–13)
ARTERIAL PATENCY WRIST A: YES
AST SERPL W P-5'-P-CCNC: 32 U/L (ref 5–45)
BASE EXCESS BLDA CALC-SCNC: 0.1 MMOL/L
BASOPHILS # BLD AUTO: 0.03 THOUSANDS/ΜL (ref 0–0.1)
BASOPHILS NFR BLD AUTO: 0 % (ref 0–1)
BILIRUB SERPL-MCNC: 1.1 MG/DL (ref 0.2–1)
BUN SERPL-MCNC: 11 MG/DL (ref 5–25)
CALCIUM SERPL-MCNC: 8.6 MG/DL (ref 8.3–10.1)
CHLORIDE SERPL-SCNC: 105 MMOL/L (ref 100–108)
CO2 SERPL-SCNC: 25 MMOL/L (ref 21–32)
CREAT SERPL-MCNC: 0.84 MG/DL (ref 0.6–1.3)
EOSINOPHIL # BLD AUTO: 0.27 THOUSAND/ΜL (ref 0–0.61)
EOSINOPHIL NFR BLD AUTO: 2 % (ref 0–6)
ERYTHROCYTE [DISTWIDTH] IN BLOOD BY AUTOMATED COUNT: 13.6 % (ref 11.6–15.1)
GFR SERPL CREATININE-BSD FRML MDRD: 98 ML/MIN/1.73SQ M
GLUCOSE SERPL-MCNC: 111 MG/DL (ref 65–140)
GLUCOSE SERPL-MCNC: 139 MG/DL (ref 65–140)
GLUCOSE SERPL-MCNC: 154 MG/DL (ref 65–140)
GLUCOSE SERPL-MCNC: 164 MG/DL (ref 65–140)
HCO3 BLDA-SCNC: 23 MMOL/L (ref 22–28)
HCT VFR BLD AUTO: 37.4 % (ref 36.5–49.3)
HGB BLD-MCNC: 12.7 G/DL (ref 12–17)
IMM GRANULOCYTES # BLD AUTO: 0.08 THOUSAND/UL (ref 0–0.2)
IMM GRANULOCYTES NFR BLD AUTO: 1 % (ref 0–2)
INR PPP: 1.23 (ref 0.86–1.17)
LYMPHOCYTES # BLD AUTO: 1.03 THOUSANDS/ΜL (ref 0.6–4.47)
LYMPHOCYTES NFR BLD AUTO: 9 % (ref 14–44)
MAGNESIUM SERPL-MCNC: 2 MG/DL (ref 1.6–2.6)
MCH RBC QN AUTO: 30 PG (ref 26.8–34.3)
MCHC RBC AUTO-ENTMCNC: 34 G/DL (ref 31.4–37.4)
MCV RBC AUTO: 88 FL (ref 82–98)
MONOCYTES # BLD AUTO: 1.22 THOUSAND/ΜL (ref 0.17–1.22)
MONOCYTES NFR BLD AUTO: 10 % (ref 4–12)
NEUTROPHILS # BLD AUTO: 9.08 THOUSANDS/ΜL (ref 1.85–7.62)
NEUTS SEG NFR BLD AUTO: 78 % (ref 43–75)
NON VENT ROOM AIR: 21 %
NRBC BLD AUTO-RTO: 0 /100 WBCS
O2 CT BLDA-SCNC: 17 ML/DL (ref 16–23)
OSMOLALITY UR/SERPL-RTO: 290 MMOL/KG (ref 282–298)
OSMOLALITY UR: 425 MMOL/KG
OXYHGB MFR BLDA: 91.5 % (ref 94–97)
PCO2 BLDA: 32.1 MM HG (ref 36–44)
PH BLDA: 7.47 [PH] (ref 7.35–7.45)
PHOSPHATE SERPL-MCNC: 2.8 MG/DL (ref 2.7–4.5)
PLATELET # BLD AUTO: 319 THOUSANDS/UL (ref 149–390)
PMV BLD AUTO: 9.7 FL (ref 8.9–12.7)
PO2 BLDA: 62.4 MM HG (ref 75–129)
POTASSIUM SERPL-SCNC: 3.3 MMOL/L (ref 3.5–5.3)
PROT SERPL-MCNC: 6.5 G/DL (ref 6.4–8.2)
PROTHROMBIN TIME: 15.4 SECONDS (ref 11.8–14.2)
RBC # BLD AUTO: 4.24 MILLION/UL (ref 3.88–5.62)
SODIUM 24H UR-SCNC: 120 MOL/L
SODIUM SERPL-SCNC: 143 MMOL/L (ref 136–145)
SP GR UR STRIP.AUTO: 1.01 (ref 1–1.03)
SPECIMEN SOURCE: ABNORMAL
WBC # BLD AUTO: 11.71 THOUSAND/UL (ref 4.31–10.16)

## 2018-11-28 PROCEDURE — 97163 PT EVAL HIGH COMPLEX 45 MIN: CPT

## 2018-11-28 PROCEDURE — 94760 N-INVAS EAR/PLS OXIMETRY 1: CPT

## 2018-11-28 PROCEDURE — G8988 SELF CARE GOAL STATUS: HCPCS

## 2018-11-28 PROCEDURE — 80053 COMPREHEN METABOLIC PANEL: CPT | Performed by: NURSE PRACTITIONER

## 2018-11-28 PROCEDURE — 97167 OT EVAL HIGH COMPLEX 60 MIN: CPT

## 2018-11-28 PROCEDURE — 85025 COMPLETE CBC W/AUTO DIFF WBC: CPT | Performed by: NURSE PRACTITIONER

## 2018-11-28 PROCEDURE — G8979 MOBILITY GOAL STATUS: HCPCS

## 2018-11-28 PROCEDURE — 84100 ASSAY OF PHOSPHORUS: CPT | Performed by: NURSE PRACTITIONER

## 2018-11-28 PROCEDURE — 82805 BLOOD GASES W/O2 SATURATION: CPT | Performed by: PHYSICIAN ASSISTANT

## 2018-11-28 PROCEDURE — 99233 SBSQ HOSP IP/OBS HIGH 50: CPT | Performed by: STUDENT IN AN ORGANIZED HEALTH CARE EDUCATION/TRAINING PROGRAM

## 2018-11-28 PROCEDURE — 94669 MECHANICAL CHEST WALL OSCILL: CPT

## 2018-11-28 PROCEDURE — 81003 URINALYSIS AUTO W/O SCOPE: CPT | Performed by: PHYSICIAN ASSISTANT

## 2018-11-28 PROCEDURE — 92526 ORAL FUNCTION THERAPY: CPT

## 2018-11-28 PROCEDURE — 85610 PROTHROMBIN TIME: CPT | Performed by: NURSE PRACTITIONER

## 2018-11-28 PROCEDURE — 36600 WITHDRAWAL OF ARTERIAL BLOOD: CPT

## 2018-11-28 PROCEDURE — 82948 REAGENT STRIP/BLOOD GLUCOSE: CPT

## 2018-11-28 PROCEDURE — 83735 ASSAY OF MAGNESIUM: CPT | Performed by: NURSE PRACTITIONER

## 2018-11-28 PROCEDURE — 84300 ASSAY OF URINE SODIUM: CPT | Performed by: PHYSICIAN ASSISTANT

## 2018-11-28 PROCEDURE — 0T9B70Z DRAINAGE OF BLADDER WITH DRAINAGE DEVICE, VIA NATURAL OR ARTIFICIAL OPENING: ICD-10-PCS | Performed by: UROLOGY

## 2018-11-28 PROCEDURE — 83935 ASSAY OF URINE OSMOLALITY: CPT | Performed by: PHYSICIAN ASSISTANT

## 2018-11-28 PROCEDURE — G8987 SELF CARE CURRENT STATUS: HCPCS

## 2018-11-28 PROCEDURE — 83930 ASSAY OF BLOOD OSMOLALITY: CPT | Performed by: PHYSICIAN ASSISTANT

## 2018-11-28 PROCEDURE — G8978 MOBILITY CURRENT STATUS: HCPCS

## 2018-11-28 PROCEDURE — 94640 AIRWAY INHALATION TREATMENT: CPT

## 2018-11-28 RX ORDER — ACETAMINOPHEN 325 MG/1
975 TABLET ORAL EVERY 6 HOURS PRN
Status: DISCONTINUED | OUTPATIENT
Start: 2018-11-28 | End: 2018-11-29

## 2018-11-28 RX ORDER — POTASSIUM CHLORIDE 20 MEQ/1
40 TABLET, EXTENDED RELEASE ORAL ONCE
Status: COMPLETED | OUTPATIENT
Start: 2018-11-28 | End: 2018-11-28

## 2018-11-28 RX ORDER — POTASSIUM CHLORIDE 29.8 MG/ML
40 INJECTION INTRAVENOUS ONCE
Status: DISCONTINUED | OUTPATIENT
Start: 2018-11-28 | End: 2018-11-28

## 2018-11-28 RX ORDER — FUROSEMIDE 10 MG/ML
20 INJECTION INTRAMUSCULAR; INTRAVENOUS ONCE
Status: COMPLETED | OUTPATIENT
Start: 2018-11-28 | End: 2018-11-28

## 2018-11-28 RX ORDER — POTASSIUM CHLORIDE 14.9 MG/ML
20 INJECTION INTRAVENOUS
Status: COMPLETED | OUTPATIENT
Start: 2018-11-28 | End: 2018-11-29

## 2018-11-28 RX ORDER — TAMSULOSIN HYDROCHLORIDE 0.4 MG/1
0.4 CAPSULE ORAL
Status: DISCONTINUED | OUTPATIENT
Start: 2018-11-28 | End: 2018-12-02 | Stop reason: HOSPADM

## 2018-11-28 RX ORDER — MAGNESIUM SULFATE 1 G/100ML
1 INJECTION INTRAVENOUS ONCE
Status: COMPLETED | OUTPATIENT
Start: 2018-11-28 | End: 2018-11-28

## 2018-11-28 RX ADMIN — ISODIUM CHLORIDE 3 ML: 0.03 SOLUTION RESPIRATORY (INHALATION) at 07:56

## 2018-11-28 RX ADMIN — ASPIRIN 81 MG 81 MG: 81 TABLET ORAL at 08:46

## 2018-11-28 RX ADMIN — BACITRACIN ZINC 1 LARGE APPLICATION: 500 OINTMENT TOPICAL at 08:46

## 2018-11-28 RX ADMIN — TAMSULOSIN HYDROCHLORIDE 0.4 MG: 0.4 CAPSULE ORAL at 16:24

## 2018-11-28 RX ADMIN — METRONIDAZOLE 500 MG: 500 INJECTION, SOLUTION INTRAVENOUS at 12:00

## 2018-11-28 RX ADMIN — METRONIDAZOLE 500 MG: 500 INJECTION, SOLUTION INTRAVENOUS at 18:29

## 2018-11-28 RX ADMIN — POTASSIUM CHLORIDE 40 MEQ: 1500 TABLET, EXTENDED RELEASE ORAL at 05:57

## 2018-11-28 RX ADMIN — MAGNESIUM SULFATE HEPTAHYDRATE 1 G: 1 INJECTION, SOLUTION INTRAVENOUS at 05:57

## 2018-11-28 RX ADMIN — LEVALBUTEROL HYDROCHLORIDE 1.25 MG: 1.25 SOLUTION, CONCENTRATE RESPIRATORY (INHALATION) at 13:43

## 2018-11-28 RX ADMIN — ISODIUM CHLORIDE 3 ML: 0.03 SOLUTION RESPIRATORY (INHALATION) at 13:43

## 2018-11-28 RX ADMIN — FUROSEMIDE 20 MG: 10 INJECTION, SOLUTION INTRAMUSCULAR; INTRAVENOUS at 21:28

## 2018-11-28 RX ADMIN — BACITRACIN ZINC 1 LARGE APPLICATION: 500 OINTMENT TOPICAL at 18:44

## 2018-11-28 RX ADMIN — METOPROLOL TARTRATE 25 MG: 25 TABLET, FILM COATED ORAL at 08:46

## 2018-11-28 RX ADMIN — INSULIN LISPRO 1 UNITS: 100 INJECTION, SOLUTION INTRAVENOUS; SUBCUTANEOUS at 19:01

## 2018-11-28 RX ADMIN — ACETAMINOPHEN 975 MG: 325 TABLET, FILM COATED ORAL at 05:57

## 2018-11-28 RX ADMIN — METRONIDAZOLE 500 MG: 500 INJECTION, SOLUTION INTRAVENOUS at 02:42

## 2018-11-28 RX ADMIN — LISINOPRIL 5 MG: 5 TABLET ORAL at 08:46

## 2018-11-28 RX ADMIN — POTASSIUM CHLORIDE 20 MEQ: 14.9 INJECTION, SOLUTION INTRAVENOUS at 21:28

## 2018-11-28 RX ADMIN — HEPARIN SODIUM 5000 UNITS: 5000 INJECTION, SOLUTION INTRAVENOUS; SUBCUTANEOUS at 14:03

## 2018-11-28 RX ADMIN — BUSPIRONE HYDROCHLORIDE 15 MG: 5 TABLET ORAL at 08:46

## 2018-11-28 RX ADMIN — CHLORHEXIDINE GLUCONATE 0.12% ORAL RINSE 15 ML: 1.2 LIQUID ORAL at 08:45

## 2018-11-28 RX ADMIN — INSULIN LISPRO 1 UNITS: 100 INJECTION, SOLUTION INTRAVENOUS; SUBCUTANEOUS at 06:03

## 2018-11-28 RX ADMIN — LEVALBUTEROL HYDROCHLORIDE 1.25 MG: 1.25 SOLUTION, CONCENTRATE RESPIRATORY (INHALATION) at 07:55

## 2018-11-28 RX ADMIN — ISODIUM CHLORIDE 3 ML: 0.03 SOLUTION RESPIRATORY (INHALATION) at 19:54

## 2018-11-28 RX ADMIN — NICOTINE 14 MG: 14 PATCH TRANSDERMAL at 08:46

## 2018-11-28 RX ADMIN — LIDOCAINE 2 PATCH: 50 PATCH CUTANEOUS at 08:46

## 2018-11-28 RX ADMIN — POTASSIUM CHLORIDE 20 MEQ: 14.9 INJECTION, SOLUTION INTRAVENOUS at 23:50

## 2018-11-28 RX ADMIN — ATORVASTATIN CALCIUM 40 MG: 40 TABLET, FILM COATED ORAL at 16:24

## 2018-11-28 RX ADMIN — HEPARIN SODIUM 5000 UNITS: 5000 INJECTION, SOLUTION INTRAVENOUS; SUBCUTANEOUS at 21:31

## 2018-11-28 RX ADMIN — TICAGRELOR 90 MG: 90 TABLET ORAL at 08:46

## 2018-11-28 RX ADMIN — LEVALBUTEROL HYDROCHLORIDE 1.25 MG: 1.25 SOLUTION, CONCENTRATE RESPIRATORY (INHALATION) at 19:54

## 2018-11-28 RX ADMIN — HEPARIN SODIUM 5000 UNITS: 5000 INJECTION, SOLUTION INTRAVENOUS; SUBCUTANEOUS at 05:57

## 2018-11-28 NOTE — PLAN OF CARE
Problem: OCCUPATIONAL THERAPY ADULT  Goal: Performs self-care activities at highest level of function for planned discharge setting  See evaluation for individualized goals  Treatment Interventions: ADL retraining, Functional transfer training, Endurance training, Cognitive reorientation, Patient/family training, Equipment evaluation/education, Compensatory technique education, Continued evaluation, Cardiac education, Energy conservation, Activityengagement          See flowsheet documentation for full assessment, interventions and recommendations  Limitation: Decreased ADL status, Decreased Safe judgement during ADL, Decreased cognition, Decreased endurance, Decreased self-care trans, Decreased high-level ADLs  Prognosis: Good, Fair  Assessment: Pt is a 66 y o  male seen for OT evaluation s/p admit to [facility] on 11/22/2018 w/ Ventricular fibrillation ([facility] [address] )  Comorbidities affecting pt's functional performance at time of assessment include:STEVIE, anxiety, CAD, cardiac arrest, and toxic metabolic encephalopathy   Orders placed for OT evaluation and treatment and "activity as trinh" order  Performed at least two patient identifiers during session including name and wristband  Personal factors affecting pt at time of IE include:steps to enter environment, behavioral pattern, difficulty performing ADLS, difficulty performing IADLS , level of education, limited insight into deficits, compliance, decreased initiation and engagement , health management  and environment  Prior to admission, pt reports IND with ADLs, IND with IADLs, and (+) driving  Upon evaluation: Pt requires min A with UB ADLs, mod A with LB ADLs, min A of 2 (due to impulsivity) with xfers and deferred functional mobility at this time due to erratic and impulsive bx    Limitations 2* the following deficits impacting occupational performance: weakness, decreased balance, decreased tolerance, impaired attention, impaired initiation, impaired memory, impaired sequencing, impaired problem solving, impulsivity, decreased safety awareness, increased pain, decreased coping skills, decreased mobilty and environmental deficits  Pt to benefit from continued skilled OT tx while in the hospital to address deficits as defined above and maximize level of functional independence w ADL's and functional mobility  Occupational Performance areas to address include: grooming, bathing/shower, toilet hygiene, dressing, medication management, socialization, health maintenance, functional mobility, community mobility, clothing management, cleaning, meal prep, money management, household maintenance, job performance/volunteering and social participation  From OT standpoint, recommendation at time of d/c would be STR         OT Discharge Recommendation: Short Term Rehab  OT - OK to Discharge:  (when medically cleared and agreeable to DC recs)

## 2018-11-28 NOTE — PHYSICAL THERAPY NOTE
Physical Therapy Evaluation     Patient's Name: Billy Zhang    Admitting Diagnosis  Cardiac arrest ([place] [address] ) [I46 9]  V-tach ([place] [address] ) [I47 2]  STEMI (ST elevation myocardial infarction) ([place] [address] ) [I21 3]    Problem List  Patient Active Problem List   Diagnosis    Ventricular fibrillation ([place] [address] )    Anxiety    STEMI (ST elevation myocardial infarction) ([place] [address] )    CAD (coronary artery disease)    Transaminitis    STEVIE (acute kidney injury) ([place] [address] )    Cardiac arrest ([place] [address] )    Toxic metabolic encephalopathy          11/28/18 1110   Note Type   Note type Eval only   Pain Assessment   Pain Assessment No/denies pain   Pain Score No Pain   Home Living   Type of [address] Two level;Stairs to enter with rails; Performs ADLs on one level; Able to live on main level with bedroom/bathroom  (3+5 MALICK, 8 to loft  optional 1st floor set up, but 2nd floor)   Bathroom Shower/Tub Tub only  ([facility] tub  also has tub/shower)   [address] bars in shower   [address] (no AD at baseline)   Prior Function   Level of [place] Independent with ADLs and functional mobility   Lives With Spouse;Son;Family   Receives Help From Family   ADL Assistance Independent   IADLs Independent   Falls in the last 6 months 0   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[employer])   Comments (+) driving   Restrictions/Precautions   Wells Shorter Bearing Precautions Per Order No   Braces or Orthoses (none per pt)   Other Precautions Chair Alarm;Cognitive; Impulsive; Restraints;Multiple lines;Telemetry; Fall Risk   General   Family/Caregiver Present No   Cognition   Overall Cognitive Status Impaired   Arousal/Participation Alert   Orientation Level Oriented to person  (inconsistent to place and time)   Memory Decreased recall of recent events   Following Commands Follows one step commands with increased time or repetition   Comments pt agreeable to PT evaluation   RUE Assessment   RUE Assessment (defer to OT eval for comments)   LUE Assessment   LUE Assessment (defer to OT eval for comments)   RLE Assessment   RLE Assessment WFL  (grossly at least 3+/5 observed with functional mobility)   LLE Assessment   LLE Assessment WFL  (grossly at least 3+/5 observed with functional mobility)   Coordination   Sensation [facility]   Light Touch   RLE Light Touch Grossly intact   LLE Light Touch Grossly intact   Bed Mobility   Additional Comments pt received seated OOB in recliner upon arrival   Transfers   Sit to Stand 4  Minimal assistance   Additional items Assist x 2;Armrests; Impulsive; Increased time required;Verbal cues   Stand to Sit 4  Minimal assistance   Additional items Assist x 2;Armrests; Impulsive; Increased time required;Verbal cues   Additional Comments 1 sit<>stand trial performed to re position pt in chair, further mobility deferred 2/2 safety/impulsive   Ambulation/Elevation   Gait pattern Not tested   Balance   Static Sitting Fair +   Dynamic Sitting Fair   Static Standing Fair   Endurance Deficit   Endurance Deficit Yes   Activity Tolerance   Activity Tolerance Patient tolerated treatment well   Medical Staff Made Aware CC staff cleared pt for eval  KARTIK Harrington  OT Maria G   Nurse Made Aware yes, spoke with pt's nurseBrown  who stated pt was appropriate for PT and made aware of outcomes   Assessment   Prognosis Good   Problem List Decreased strength;Decreased endurance; Impaired balance;Decreased mobility; Decreased cognition; Impaired judgement;Decreased safety awareness   Assessment Pt is 66 y o  male seen for PT evaluation on 11/28/2018 s/p admit [facility] on 11/22/2018 w/ Ventricular fibrillation ([facility] [address] )  Pt presents after cardiac arrest, was found pulseless by police and received 2 shocks at home before EMS arrived, en route was in vtach and vfib  PT consulted to assess pt's functional mobility and d/c needs   Order placed for PT eval and tx, w/ activity as tolerated order  Performed at least 2 patient identifiers during session: Name and wristband  Comorbidities affecting pt's physical performance at time of assessment include: smoker  PTA, pt was independent w/ all functional mobility w/ no AD/DME, ambulates unrestricted distances and all terrain, has 3+5 MALICK, lives w/ spouse and family in 2 level home, works full time and (+) driving  Personal factors affecting pt at time of IE include: inaccessible home environment, inability to ambulate household distances, decreased initiation and engagement, impulsivity and limited insight into impairments  Please find objective findings from PT assessment regarding body systems outlined above with impairments and limitations including weakness, impaired balance, decreased endurance, decreased activity tolerance, decreased safety awareness, impaired judgement, fall risk and decreased cognition, as well as mobility assessment (need for min ax2 assist w/ all phases of mobility when usually ambulating independently and need for cueing for mobility technique)  The following objective measures performed on IE also reveal limitations: Barthel Index: 30/100 and Modified Aston: 4 (moderate/severe disability)  Pt's clinical presentation is currently unstable/unpredictable seen in pt's presentation of need for input for task focus and mobility technique, on telemetry monitoring and ongoing medical assessment  Pt to benefit from continued PT tx to address deficits as defined above and maximize level of functional independent mobility and consistency  From PT/mobility standpoint, recommendation at time of d/c would be STR pending progress in order to facilitate return to PLOF     Barriers to Discharge Inaccessible home environment;Decreased caregiver support   Goals   STG Expiration Date 12/08/18   Short Term Goal #1 In 7-10 days: Increase bilateral LE strength 1/2 grade to facilitate independent mobility, Perform all bed mobility tasks modified independent to decrease caregiver burden, Perform all transfers modified independent to improve independence, Increase all balance 1/2 grade to decrease risk for falls, Improve Barthel Index score to 45 or greater to facilitate independence and PT to see and establish goals for gait when appropriate   Treatment Day 0   Plan   Treatment/Interventions Functional transfer training;LE strengthening/ROM; Therapeutic exercise; Endurance training;Patient/family training;Equipment eval/education; Bed mobility;Spoke to case management;Spoke to nursing;Spoke to advanced practitioner;OT   PT Frequency (3-5x/wk)   Recommendation   Recommendation Short-term skilled PT   Equipment Recommended (TBD)   PT - OK to Discharge Yes  (when medically cleared if to STR)   Modified [place] Scale   Modified [place] Scale 4   Barthel Index   Feeding 5   Bathing 0   Grooming Score 0   Dressing Score 5   Bladder Score 0   Bowels Score 10   Toilet Use Score 5   Transfers (Bed/Chair) Score 5   Mobility (Level Surface) Score 0   Stairs Score 0   Barthel Index Score 30       Malissa Youngblood, PT, DPT

## 2018-11-28 NOTE — DISCHARGE INSTR - DIET
Regular and Thin Liquid diet  Assist patient with oral care, including care of dentures  May need setup assistance with trays if disoriented

## 2018-11-28 NOTE — SPEECH THERAPY NOTE
Speech/Language Pathology Progress Note    Patient Name: Cuco Dumont Date: 11/28/2018       Subjective:  "I'm all right "  Pt out of bed in chair seated upright with Son belt in place  Wife Dilip Vasquez and RN Ivan Paris present  Pt, wife, and RN all report good appetite and good tolerance of diet with dentures in place  Objective: Although still presenting with tangential answers to ~50% of speech, pt was eventually able to respond accurately to questions and commands given repetition and cueing from SLP and wife  Thin liquids via straw grossly WNL  Pt declined all offered foods, "I'm waiting for my lunch  When is it coming?" Pt's wife pointed out they had not ordered lunch yet, pt replied "Oh " He again declined offered food items  D/w pt whether he would like to advance to a Regular diet or remain on Dysphagia 3, he decided he would like to stay on Dysphagia 3 for now  Assessment:  Pt is tolerating current Dysphagia 3/Dental Soft and Thin Liquid diet, and would prefer to remain on this diet for now  Plan/Recommendations:  Continue current Dysphagia 3/Dental Soft and Thin Liquid diet  ST will continue to follow

## 2018-11-28 NOTE — SOCIAL WORK
PT rec STR  MD rec cardiac rehab  Cm spoke w pt and wife  Agreeable  However, the [facility], [facility] is in [place]  Preference for [facility]  Referral sent   Pt likely to transfer to [facility]

## 2018-11-28 NOTE — OCCUPATIONAL THERAPY NOTE
Occupational Therapy Evaluation      Billy Zhang    11/28/2018    Patient Active Problem List   Diagnosis    [facility]    Anxiety    STEMI [facility]ST elevation [facility] infarction) ([facility] )    CAD (coronary artery disease)    Transaminitis    STEVIE (acute kidney injury) ([facility] )    Cardiac arrest ([facility] )    Toxic metabolic encephalopathy       History reviewed  No pertinent past medical history  History reviewed  No pertinent surgical history  11/28/18 1109   Note Type   Note type Eval/Treat   Restrictions/Precautions   Weight Bearing Precautions Per Order No   Braces or Orthoses (none per pt)   Other Precautions Chair Alarm; Impulsive;Cognitive; Restraints;Multiple lines;Telemetry; Fall Risk   Pain Assessment   Pain Assessment No/denies pain   Pain Score No Pain   Home Living   Type of [address] Two level;Stairs to enter with rails; Performs ADLs on one level; Able to live on main level with bedroom/bathroom  (3+5 MALICK, 8 to loft  optional 1st floor set up, but 2nd floor)   Bathroom Shower/Tub Tub/shower unit   Bathroom Toilet Standard   Bathroom Equipment Grab bars in shower   [address] (no AD at baseline)   Prior Function   Level of [place] Independent with ADLs and functional mobility   Lives With Spouse;Son;Family   Receives Help From Family   ADL Assistance Independent   IADLs Independent   Falls in the last 6 months 0   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[employer])   Comments (+) driving   Lifestyle   Autonomy Pt was impulsive and bx was irratic throughout OT eval process  Pt reports IND with ADLs, IADLs, and driving PTA  Reciprocal Relationships wife, son, and Mother in law   Psychosocial   Psychosocial (WDL) X   Patient Behaviors/Mood Anxious; Hallucinations   ADL   Eating Assistance 5  Supervision/Setup   Eating Deficit Increased time to complete;Setup;Verbal cueing   Grooming Assistance 4  Minimal Assistance Grooming Deficit Setup; Increased time to complete; Impulsive;Verbal cueing   UB Bathing Assistance 4  Minimal Assistance   UB Bathing Deficit Increased time to complete;Supervision/safety;Setup; Impulsive   LB Bathing Assistance 3  Moderate Assistance   LB Bathing Deficit Increased time to complete;Supervision/safety;Setup; Impulsive   UB Dressing Assistance 4  Minimal Assistance   UB Dressing Deficit Increased time to complete;Supervision/safety;Setup; Impulsive   LB Dressing Assistance 3  Moderate Assistance   LB Dressing Deficit Setup;Supervision/safety; Increased time to complete; Impulsive   Toileting Assistance  3  Moderate Assistance   Toileting Deficit Supervison/safety; Increased time to complete; Impulsive   Functional Assistance 3  Moderate Assistance   Functional Deficit Increased time to complete;Supervision/safety;Setup; Impulsive   Bed Mobility   Rolling R Unable to assess   Additional Comments pt received seated OOB in recliner upon arrival   Transfers   Sit to Stand 4  Minimal assistance   Additional items Assist x 2;Armrests; Increased time required; Impulsive;Verbal cues   Stand to Sit 4  Minimal assistance   Additional items Assist x 2;Armrests; Increased time required; Impulsive;Verbal cues   Balance   Static Sitting Fair +   Dynamic Sitting Fair   Static Standing Fair   Activity Tolerance   Activity Tolerance Patient tolerated treatment well   Nurse Made Aware yes, spoke with pt's nurse, Jana Negron   who stated pt was appropriate for Ot and made aware of outcomes   RUE Assessment   RUE Assessment WFL   LUE Assessment   LUE Assessment WFL   Sensation   Light Touch No apparent deficits   Sharp/Dull No apparent deficits   Stereognosis No apparent deficits   Proprioception   Proprioception No apparent deficits   Vision-Basic Assessment   Current Vision Wears glasses all the time   Vision - Complex Assessment   Ocular Range of Motion [facility]   Perception   Inattention/Neglect Appears intact   Cognition   Overall Cognitive Status Impaired   Arousal/Participation Alert;Arousable   Attention Attends with cues to redirect   Orientation Level Oriented to person  (inconsistent to place and time)   Memory Decreased recall of recent events   Following Commands Follows one step commands with increased time or repetition   Comments Pt was agreeable to OT eval   Assessment   Limitation Decreased ADL status; Decreased Safe judgement during ADL;Decreased cognition;Decreased endurance;Decreased self-care trans;Decreased high-level ADLs   Prognosis Good;Fair   Assessment Pt is a 66 y o  male seen for OT evaluation s/p admit to [facility] on 11/22/2018 w/ Ventricular fibrillation ([facility] [address] )  Comorbidities affecting pt's functional performance at time of assessment include:STEVIE, anxiety, CAD, cardiac arrest, and toxic metabolic encephalopathy   Orders placed for OT evaluation and treatment and "activity as trinh" order  Performed at least two patient identifiers during session including name and wristband  Personal factors affecting pt at time of IE include:steps to enter environment, behavioral pattern, difficulty performing ADLS, difficulty performing IADLS , level of education, limited insight into deficits, compliance, decreased initiation and engagement , health management  and environment  Prior to admission, pt reports IND with ADLs, IND with IADLs, and (+) driving  Upon evaluation: Pt requires min A with UB ADLs, mod A with LB ADLs, min A of 2 (due to impulsivity) with xfers and deferred functional mobility at this time due to erratic and impulsive bx  Limitations 2* the following deficits impacting occupational performance: weakness, decreased balance, decreased tolerance, impaired attention, impaired initiation, impaired memory, impaired sequencing, impaired problem solving, impulsivity, decreased safety awareness, increased pain, decreased coping skills, decreased mobilty and environmental deficits   Pt to benefit from continued skilled OT tx while in the hospital to address deficits as defined above and maximize level of functional independence w ADL's and functional mobility  Occupational Performance areas to address include: grooming, bathing/shower, toilet hygiene, dressing, medication management, socialization, health maintenance, functional mobility, community mobility, clothing management, cleaning, meal prep, money management, household maintenance, job performance/volunteering and social participation  From OT standpoint, recommendation at time of d/c would be STR  Plan   Treatment Interventions ADL retraining;Functional transfer training; Endurance training;Cognitive reorientation;Patient/family training;Equipment evaluation/education; Compensatory technique education;Continued evaluation;Cardiac education; Energy conservation; Activityengagement   Goal Expiration Date 12/11/18   OT Frequency 3-5x/wk   Recommendation   OT Discharge Recommendation Short Term Rehab   OT - OK to Discharge (when medically cleared and agreeable to DC recs)   Barthel Index   Feeding 5   Bathing 0   Grooming Score 0   Dressing Score 5   Bladder Score 0   Bowels Score 10   Toilet Use Score 5   Transfers (Bed/Chair) Score 5   Mobility (Level Surface) Score 0   Stairs Score 0   Barthel Index Score 30   Modified Aston Scale   Modified [place] Scale 3     Occupational therapy Goals: In 2-4 days    1 - Patient will completed passport to independence program in order to further determine DC needs as well as any additional recommendations/ education  2 - Patient will verbalize and demonstrate use of energy conservation/ deep breathing technique and work simplification skills during functional activity with no verbal cues      3 - Patient will verbalize and demonstrate good body mechanics and joint protection techniques during ADLs/ IADLs with no verbal cues     4 - Patient will increase OOB/ sitting tolerance to 4-6 hours per day for increased participation in self care and leisure tasks with no s/s of exertion  5 - Patient will identify s/s of exertion during ADL and functional mobility with no verbal cues  6 - Patient will verbalize/ demonstrate compensatory strategies to recover from exertion with no verbal cues  7 - Patient will increase standing tolerance time to 10 minutes with No UE support to complete sink level ADLs @ Mod I level  8 - Patient will increase sitting tolerance at edge of bed to 30 minutes to complete UB ADLs @ Indep  level       9 - Patient/ Family will demonstrate competency with [address]  Abena Wright MS OTR/L

## 2018-11-28 NOTE — PROGRESS NOTES
Progress Note - Critical Care   Billy Zhang 66 y o  male MRN: [id number]  Unit/Bed#:  Encounter: [id number]    Attending Physician: Grady Vizcaino MD      ______________________________________________________________________  Assessment and Plan:   Principal Problem:    Ventricular fibrillation [facility])  Active Problems:    Cardiac arrest ([facility] )    STEMI (ST elevation myocardial infarction) ([facility] )    CAD (coronary artery disease)    Toxic metabolic encephalopathy    STEVIE (acute kidney injury) ([facility] )    Transaminitis    Anxiety  Resolved Problems:    Bandemia        Neuro:  Remains transiently encephalopathic, having periods of a spontaneous impulsiveness, cannot exclude hypoxic injury  Continue monitor neurologic status, encourage proper sleep hygiene, melatonin and Zyprexa ordered, monitor CAM-ICU    CV: S/P STEMI/ventricular fibrillation arrest status post PCI with CRUZ to the LAD  Continue monitor hemodynamics, continue anticoagulants/antiplatelets  Will require AICD when mental status improves    Pulm:  Encourage use of incentive spirometry and deep breathing, patient out of bed as tolerated, provide supplemental oxygen to maintain saturation greater than 95%, bronchodilators as ordered    GI:  Continue monitor transaminase    : Monitor I&O closely, monitor and trend renal indices    F/E/N:  Monitor replete electrolytes as indicated, maintain potassium greater than 4 0 and magnesium greater than 2 0    ID:  No indication for antibiotics    Heme:  Monitor and trend hemoglobin/hematocrit, platelets, continue anticoagulation as ordered    Endo:  Monitor blood glucose and treat as indicated     Msk/Skin:  Encourage patient out of bed    Disposition:  Remains ICU    Code Status: Level 1 - Full Code    Counseling / Coordination of Care  Total Critical Care time spent 40 minutes excluding procedures, teaching and family updates  ______________________________________________________________________    Chief Complaint:  Complaint parasternal pain    24 Hour Events: Patient became confused, and impulsive last evening requiring placement of safety belt, also received melatonin and Zyprexa  Did require straight cathed x1 with urine output greater than 1200 mL      ______________________________________________________________________    Physical Exam:   Constitutional:  Physical status improvement  HEENT:  Normocephalic, atraumatic, pupils equal reactive, EOM intact, sclera anicteric, airway patent, trachea midline without tugging, negative JVD, neck supple  Pulm:  Lungs equal with scattered rhonchi  CV: HRRR  ABD:  Soft/nontender  M/S:  Moves all extremities well, good distal CC SM  Skin:  Warm/dry/intact  Neuro:  No focal deficits  Mental Status:  Episodic periods of confusion, and impulsiveness    ______________________________________________________________________  Vitals:    18 2100 18 2200 18 2300 18 0000   BP: 146/83 150/80 160/90 148/85   BP Location:   Right arm    Pulse: 84 80 71 68   Resp: (!) 25 20 22 22   Temp:   98 7 °F (37 1 °C)    TempSrc:   Oral    SpO2: 96% 94% 96% (!) 51%   Weight:       Height:         Arterial Line BP: 78/71  Arterial Line MAP (mmHg): 74 mmHg     Temperature:   Temp (24hrs), Av 6 °F (37 °C), Min:98 3 °F (36 8 °C), Max:98 7 °F (37 1 °C)    Current Temperature: 98 7 °F (37 1 °C)  Weights:   IBW: 77 6 kg    Body mass index is 27 48 kg/m²  Weight (last 2 days)     None        Hemodynamic Monitoring:  N/A     Non-Invasive/Invasive Ventilation Settings:  Respiratory    Lab Data (Last 4 hours)    None         O2/Vent Data (Last 4 hours)    None              No results found for: PHART, BAX1UTY, PO2ART, LJA8QXM, T9MQVAEB, BEART, SOURCE  SpO2: SpO2: (!) 51 %    Intake and Outputs:  I/O       701 -  0700 701 -  0700    P  O  120 760    I V  (mL/kg) 1579 3 (17 2) 150 (1 6)    IV Piggyback 256 7 300    Total Intake(mL/kg) 1956 (21 3) 1210 (13 2)    Urine (mL/kg/hr) 3590 (1 6) 2250 (1)    Total Output 3590 2250    Net -1634 -1040              UOP: qs/hour   Nutrition:        Diet Orders            Start     Ordered    11/27/18 1557  Diet Dysphagia/Modified Consistency; Dysphagia 3-Dental Soft; Thin Liquid  Diet effective now     Comments:  No barley or barley products   Question Answer Comment   Diet Type Dysphagia/Modified Consistency    Dysphagia/Modified Consistency Dysphagia 3-Dental Soft    Liquid Modifier Thin Liquid    RD to adjust diet per protocol?  Yes        11/27/18 1556    11/27/18 1154  Dietary nutrition supplements  Once     Question Answer Comment   Select Supplement: Ensure Enlive-Vanilla    Frequency Breakfast, Dinner        11/27/18 1153          Labs:     Results from last 7 days  Lab Units 11/27/18  0617 11/26/18  0657 11/25/18  0518 11/24/18  0509 11/23/18  0449   WBC Thousand/uL 12 18* 15 46* 14 32* 13 88* 14 67*   HEMOGLOBIN g/dL 10 2* 11 8* 12 9 12 4 12 4   HEMATOCRIT % 30 2* 34 7* 38 0 37 0 37 2   PLATELETS Thousands/uL 245 221 208 187 232   NEUTROS PCT % 79*  --   --  81* 83*   MONOS PCT % 10  --   --  9 8       Results from last 7 days  Lab Units 11/27/18  0617 11/26/18  1015 11/26/18  0657 11/25/18  0518  11/23/18  0449  11/22/18  1226   POTASSIUM mmol/L 3 6  --  3 6 4 4  < > 5 2  < >  --    CHLORIDE mmol/L 106  --  108 106  < > 106  < >  --    CO2 mmol/L 21  --  24 25  < > 22  < >  --    CO2, I-STAT mmol/L  --   --   --   --   --   --   --  19*   BUN mg/dL 9  --  18 26*  < > 25  < >  --    CREATININE mg/dL 0 67  --  0 79 0 90  < > 1 01  < >  --    CALCIUM mg/dL 7 0*  --  8 7 8 9  < > 8 0*  < >  --    ALK PHOS U/L 56 70  --   --   --  69  < >  --    ALT U/L 58 92*  --   --   --  254*  < >  --    AST U/L 33 60*  --   --   --  237*  < >  --    GLUCOSE, ISTAT mg/dl  --   --   --   --   --   --   --  332*   < > = values in this interval not displayed  Results from last 7 days  Lab Units 11/27/18  0617 11/25/18  0518 11/24/18  0509   MAGNESIUM mg/dL 2 4 2 2 2 3     Lab Results   Component Value Date    PHOS 2 3 (L) 11/27/2018    PHOS 2 9 11/23/2018    PHOS 4 2 11/22/2018        Results from last 7 days  Lab Units 11/22/18  1811 11/22/18  1514 11/22/18  1240   INR  1 36* 2 09* 1 17   PTT seconds  --  84* 38       0  Lab Value Date/Time   TROPONINI 27 92 (H) 11/23/2018 0955   TROPONINI 34 56 (H) 11/23/2018 0449   TROPONINI 34 45 (H) 11/23/2018 0018   TROPONINI 33 74 (H) 11/22/2018 2019   TROPONINI 10 63 (H) 11/22/2018 1609   TROPONINI 5 20 (H) 11/22/2018 1514   TROPONINI 1 11 (H) 11/22/2018 1240       Results from last 7 days  Lab Units 11/23/18  0449 11/23/18  0018 11/22/18  1815   LACTIC ACID mmol/L 1 7 1 8 2 5*     ABG:  Lab Results   Component Value Date    PHART 7 406 11/23/2018    IUY0GAG 30 8 (L) 11/23/2018    PO2ART 134 7 (H) 11/23/2018    RGH6TUP 18 9 (L) 11/23/2018    BEART -4 7 11/23/2018    SOURCE Line, Arterial 11/23/2018     Imaging:  I have personally reviewed pertinent reports  EKG: SR  Micro:  Lab Results   Component Value Date    BLOODCX No Growth After 5 Days  11/22/2018    BLOODCX No Growth After 5 Days  11/22/2018     Allergies:    Allergies   Allergen Reactions    Barley Grass Throat Swelling     Medications:   Scheduled Meds:  Current Facility-Administered Medications:  acetaminophen 975 mg Oral Q6H [facility] LALITHA Kim    aspirin 81 mg Oral Daily Alexa Ang PA-C    atorvastatin 40 mg Oral Daily With Gunner Chris PA-C    bacitracin 1 large application Topical BID Alexa Ang PA-C    busPIRone 15 mg Oral BID Renata Isbell PA-C    chlorhexidine 15 mL [facility] Alexa Ang PA-C    heparin (porcine) 5,000 Units Subcutaneous [facility] Renata Isbell PA-C    insulin lispro 1-6 Units Subcutaneous Q6H [facility] Alexa Ang PA-C    levalbuterol 1 25 mg Nebulization TID Kit MD Carrillo    lidocaine 2 patch Topical Daily Marclairela NovemberCATARINA    lisinopril 5 mg Oral Daily LALITHA De La Cruz    melatonin 6 mg Oral HS Melani Mosqueda PA-C    metoprolol tartrate 25 mg Oral Q12H [address] Lieutenant Rashawn PA-C    metroNIDAZOLE 500 mg Intravenous Q8H Benita Pump, CRNP Last Rate: 500 mg (11/27/18 1755)   nicotine 14 mg Transdermal Daily Lieutenant Rashawn PA-C    OLANZapine 10 mg Oral HS Benita Pump, CRNP    oxyCODONE 2 5 mg Oral Q4H PRN LALITHA De La Cruz    sodium chloride 3 mL Nebulization TID Zabrina Camarena MD    ticagrelor 90 mg Oral Q12H [address] Fabien South, MD      Continuous Infusions:   PRN Meds:    oxyCODONE 2 5 mg Q4H PRN     VTE Pharmacologic Prophylaxis: Heparin  VTE Mechanical Prophylaxis: sequential compression device  Invasive lines and devices: Invasive Devices     Peripheral Intravenous Line            Peripheral IV 11/25/18 Left Antecubital 2 days    Peripheral IV 11/25/18 Right Forearm 2 days                     Portions of the record may have been created with voice recognition software  Occasional wrong word or "sound a like" substitutions may have occurred due to the inherent limitations of voice recognition software  Read the chart carefully and recognize, using context, where substitutions have occurred      Melani Mosqueda PA-C

## 2018-11-28 NOTE — PLAN OF CARE
Problem: PHYSICAL THERAPY ADULT  Goal: Performs mobility at highest level of function for planned discharge setting  See evaluation for individualized goals  Treatment/Interventions: Functional transfer training, LE strengthening/ROM, Therapeutic exercise, Endurance training, Patient/family training, Equipment eval/education, Bed mobility, Spoke to case management, Spoke to nursing, Spoke to advanced practitioner, OT  Equipment Recommended:  (TBD)       See flowsheet documentation for full assessment, interventions and recommendations  Prognosis: Good  Problem List: Decreased strength, Decreased endurance, Impaired balance, Decreased mobility, Decreased cognition, Impaired judgement, Decreased safety awareness  Assessment: Pt is 66 y o  male seen for PT evaluation on 11/28/2018 s/p admit to [facility] on 11/22/2018 w/ Ventricular fibrillation ([facility] [address] [facility] )  Pt presents after cardiac arrest, was found pulseless by police and received 2 shocks at home before EMS arrived, en route was in vtach and vfib  PT consulted to assess pt's functional mobility and d/c needs  Order placed for PT eval and tx, w/ activity as tolerated order  Performed at least 2 patient identifiers during session: Name and wristband  Comorbidities affecting pt's physical performance at time of assessment include: smoker  PTA, pt was independent w/ all functional mobility w/ no AD/DME, ambulates unrestricted distances and all terrain, has 3+5 MALICK, lives w/ spouse and family in 2 level home, works full time and (+) driving  Personal factors affecting pt at time of IE include: inaccessible home environment, inability to ambulate household distances, decreased initiation and engagement, impulsivity and limited insight into impairments   Please find objective findings from PT assessment regarding body systems outlined above with impairments and limitations including weakness, impaired balance, decreased endurance, decreased activity tolerance, decreased safety awareness, impaired judgement, fall risk and decreased cognition, as well as mobility assessment (need for min ax2 assist w/ all phases of mobility when usually ambulating independently and need for cueing for mobility technique)  The following objective measures performed on IE also reveal limitations: Barthel Index: 30/100 and Modified Haskell: 4 (moderate/severe disability)  Pt's clinical presentation is currently unstable/unpredictable seen in pt's presentation of need for input for task focus and mobility technique, on telemetry monitoring and ongoing medical assessment  Pt to benefit from continued PT tx to address deficits as defined above and maximize level of functional independent mobility and consistency  From PT/mobility standpoint, recommendation at time of d/c would be STR pending progress in order to facilitate return to PLOF  Barriers to Discharge: Inaccessible home environment, Decreased caregiver support     Recommendation: Short-term skilled PT     PT - OK to Discharge: Yes (when medically cleared if to STR)    See flowsheet documentation for full assessment

## 2018-11-28 NOTE — PROGRESS NOTES
Progress Note - Cardiology   Billy Zhang 66 y o  male MRN: [id number]  Unit/Bed#:  Encounter: [id number]    Assessment:  1  S/P Vfib cardiac arrest  2  S/P STEMI  3  S/P CRUZ to LAD  4  Ischemic cardiomyopathy  5  Post SCA encephalopathy    Plan:  1  Continue current meds  2  Plan ICD implant prior to discharge  Subjective/Objective   Chief Complaint: None    Subjective: No chest pain    Objective: Awake and alert    Vitals: /90   Pulse 92   Temp 98 3 °F (36 8 °C) (Oral)   Resp 16   Ht 6' (1 829 m)   Wt 91 9 kg (202 lb 9 6 oz)   SpO2 96%   BMI 27 48 kg/m²   Vitals:    11/24/18 0552 11/25/18 0600   Weight: 99 7 kg (219 lb 12 8 oz) 91 9 kg (202 lb 9 6 oz)     Orthostatic Blood Pressures      Most Recent Value   Blood Pressure  155/90 filed at 11/27/2018 2000   Patient Position - Orthostatic VS  Lying filed at 11/27/2018 1900            Intake/Output Summary (Last 24 hours) at 11/27/18 2100  Last data filed at 11/27/18 2000   Gross per 24 hour   Intake             1760 ml   Output             2040 ml   Net             -280 ml       Invasive Devices     Peripheral Intravenous Line            Peripheral IV 11/25/18 Left Antecubital 2 days    Peripheral IV 11/25/18 Right Forearm 2 days                Review of Systems: Cardiovascular ROS: no chest pain or dyspnea on exertion    Physical Exam:   Cardiovascular: Normal rate, regular rhythm and normal heart sounds     No murmur heard    Pulmonary/Chest: Breath sounds normal         Lab Results:   CBC with diff:   Results from last 7 days  Lab Units 11/27/18  0617   WBC Thousand/uL 12 18*   RBC Million/uL 3 36*   HEMOGLOBIN g/dL 10 2*   HEMATOCRIT % 30 2*   MCV fL 90   MCH pg 30 4   MCHC g/dL 33 8   RDW % 13 5   MPV fL 10 0   PLATELETS Thousands/uL 245     CMP:   Results from last 7 days  Lab Units 11/27/18  0617  11/22/18  1226   SODIUM mmol/L 142  < >  --    POTASSIUM mmol/L 3 6  < >  --    CHLORIDE mmol/L 106  < >  --    CO2 mmol/L 21  < > --    CO2, I-STAT mmol/L  --   --  19*   BUN mg/dL 9  < >  --    CREATININE mg/dL 0 67  < >  --    GLUCOSE, ISTAT mg/dl  --   --  332*   CALCIUM mg/dL 7 0*  < >  --    AST U/L 33  < >  --    ALT U/L 58  < >  --    ALK PHOS U/L 56  < >  --    EGFR ml/min/1 73sq m 107  < > 57   < > = values in this interval not displayed  Troponin:   0  Lab Value Date/Time   TROPONINI 27 92 (H) 11/23/2018 0955   TROPONINI 34 56 (H) 11/23/2018 0449   TROPONINI 34 45 (H) 11/23/2018 0018   TROPONINI 33 74 (H) 11/22/2018 2019   TROPONINI 10 63 (H) 11/22/2018 1609   TROPONINI 5 20 (H) 11/22/2018 1514   TROPONINI 1 11 (H) 11/22/2018 1240     BNP:   Results from last 7 days  Lab Units 11/27/18  0617  11/22/18  1226   POTASSIUM mmol/L 3 6  < >  --    CHLORIDE mmol/L 106  < >  --    CO2 mmol/L 21  < >  --    CO2, I-STAT mmol/L  --   --  19*   BUN mg/dL 9  < >  --    CREATININE mg/dL 0 67  < >  --    GLUCOSE, ISTAT mg/dl  --   --  332*   CALCIUM mg/dL 7 0*  < >  --    EGFR ml/min/1 73sq m 107  < > 57   < > = values in this interval not displayed  Coags:   Results from last 7 days  Lab Units 11/22/18  1811 11/22/18  1514   PTT seconds  --  84*   INR  1 36* 2 09*     TSH:   Results from last 7 days  Lab Units 11/22/18  1514   TSH 3RD GENERATON uIU/mL 5 993*     Magnesium:   Results from last 7 days  Lab Units 11/27/18  0617   MAGNESIUM mg/dL 2 4     Lipid Profile:   Results from last 7 days  Lab Units 11/22/18  1514   HDL mg/dL 37*   LDL CALC mg/dL 81   TRIGLYCERIDES mg/dL 117     Imaging: I have personally reviewed pertinent reports  EKG:   VTE Pharmacologic Prophylaxis:   VTE Mechanical Prophylaxis:     Counseling / Coordination of Care  Total time spent today 30 minutes  Greater than 50% of total time was spent with the patient and / or family counseling and / or coordination of care   A description of the counseling / coordination of care: 30

## 2018-11-29 ENCOUNTER — APPOINTMENT (INPATIENT)
Dept: RADIOLOGY | Facility: HOSPITAL | Age: 58
DRG: 246 | End: 2018-11-29
Payer: COMMERCIAL

## 2018-11-29 PROBLEM — N17.9 AKI (ACUTE KIDNEY INJURY) (HCC): Status: RESOLVED | Noted: 2018-11-22 | Resolved: 2018-11-29

## 2018-11-29 PROBLEM — R74.01 TRANSAMINITIS: Status: RESOLVED | Noted: 2018-11-22 | Resolved: 2018-11-29

## 2018-11-29 LAB
ALBUMIN SERPL BCP-MCNC: 3 G/DL (ref 3.5–5)
ALP SERPL-CCNC: 78 U/L (ref 46–116)
ALT SERPL W P-5'-P-CCNC: 65 U/L (ref 12–78)
ANION GAP SERPL CALCULATED.3IONS-SCNC: 12 MMOL/L (ref 4–13)
AST SERPL W P-5'-P-CCNC: 43 U/L (ref 5–45)
BASOPHILS # BLD AUTO: 0.02 THOUSANDS/ΜL (ref 0–0.1)
BASOPHILS NFR BLD AUTO: 0 % (ref 0–1)
BILIRUB SERPL-MCNC: 0.9 MG/DL (ref 0.2–1)
BUN SERPL-MCNC: 13 MG/DL (ref 5–25)
CALCIUM SERPL-MCNC: 8.7 MG/DL (ref 8.3–10.1)
CHLORIDE SERPL-SCNC: 104 MMOL/L (ref 100–108)
CO2 SERPL-SCNC: 23 MMOL/L (ref 21–32)
CREAT SERPL-MCNC: 0.84 MG/DL (ref 0.6–1.3)
EOSINOPHIL # BLD AUTO: 0.24 THOUSAND/ΜL (ref 0–0.61)
EOSINOPHIL NFR BLD AUTO: 2 % (ref 0–6)
ERYTHROCYTE [DISTWIDTH] IN BLOOD BY AUTOMATED COUNT: 13.5 % (ref 11.6–15.1)
GFR SERPL CREATININE-BSD FRML MDRD: 98 ML/MIN/1.73SQ M
GLUCOSE SERPL-MCNC: 173 MG/DL (ref 65–140)
GLUCOSE SERPL-MCNC: 177 MG/DL (ref 65–140)
GLUCOSE SERPL-MCNC: 185 MG/DL (ref 65–140)
GLUCOSE SERPL-MCNC: 194 MG/DL (ref 65–140)
GLUCOSE SERPL-MCNC: 235 MG/DL (ref 65–140)
HCT VFR BLD AUTO: 37.6 % (ref 36.5–49.3)
HGB BLD-MCNC: 12.9 G/DL (ref 12–17)
IMM GRANULOCYTES # BLD AUTO: 0.09 THOUSAND/UL (ref 0–0.2)
IMM GRANULOCYTES NFR BLD AUTO: 1 % (ref 0–2)
LYMPHOCYTES # BLD AUTO: 1.26 THOUSANDS/ΜL (ref 0.6–4.47)
LYMPHOCYTES NFR BLD AUTO: 13 % (ref 14–44)
MAGNESIUM SERPL-MCNC: 1.9 MG/DL (ref 1.6–2.6)
MCH RBC QN AUTO: 30 PG (ref 26.8–34.3)
MCHC RBC AUTO-ENTMCNC: 34.3 G/DL (ref 31.4–37.4)
MCV RBC AUTO: 87 FL (ref 82–98)
MONOCYTES # BLD AUTO: 1.08 THOUSAND/ΜL (ref 0.17–1.22)
MONOCYTES NFR BLD AUTO: 11 % (ref 4–12)
NEUTROPHILS # BLD AUTO: 7.15 THOUSANDS/ΜL (ref 1.85–7.62)
NEUTS SEG NFR BLD AUTO: 73 % (ref 43–75)
NRBC BLD AUTO-RTO: 0 /100 WBCS
PHOSPHATE SERPL-MCNC: 3 MG/DL (ref 2.7–4.5)
PLATELET # BLD AUTO: 392 THOUSANDS/UL (ref 149–390)
PMV BLD AUTO: 9.8 FL (ref 8.9–12.7)
POTASSIUM SERPL-SCNC: 3.5 MMOL/L (ref 3.5–5.3)
PROT SERPL-MCNC: 7 G/DL (ref 6.4–8.2)
RBC # BLD AUTO: 4.3 MILLION/UL (ref 3.88–5.62)
SODIUM SERPL-SCNC: 139 MMOL/L (ref 136–145)
WBC # BLD AUTO: 9.84 THOUSAND/UL (ref 4.31–10.16)

## 2018-11-29 PROCEDURE — 99254 IP/OBS CNSLTJ NEW/EST MOD 60: CPT | Performed by: PHYSICIAN ASSISTANT

## 2018-11-29 PROCEDURE — 82948 REAGENT STRIP/BLOOD GLUCOSE: CPT

## 2018-11-29 PROCEDURE — 85025 COMPLETE CBC W/AUTO DIFF WBC: CPT | Performed by: NURSE PRACTITIONER

## 2018-11-29 PROCEDURE — 80053 COMPREHEN METABOLIC PANEL: CPT | Performed by: NURSE PRACTITIONER

## 2018-11-29 PROCEDURE — 84100 ASSAY OF PHOSPHORUS: CPT | Performed by: NURSE PRACTITIONER

## 2018-11-29 PROCEDURE — 94669 MECHANICAL CHEST WALL OSCILL: CPT

## 2018-11-29 PROCEDURE — 71045 X-RAY EXAM CHEST 1 VIEW: CPT

## 2018-11-29 PROCEDURE — 94760 N-INVAS EAR/PLS OXIMETRY 1: CPT

## 2018-11-29 PROCEDURE — 94640 AIRWAY INHALATION TREATMENT: CPT

## 2018-11-29 PROCEDURE — 92526 ORAL FUNCTION THERAPY: CPT

## 2018-11-29 PROCEDURE — 99233 SBSQ HOSP IP/OBS HIGH 50: CPT | Performed by: STUDENT IN AN ORGANIZED HEALTH CARE EDUCATION/TRAINING PROGRAM

## 2018-11-29 PROCEDURE — 83735 ASSAY OF MAGNESIUM: CPT | Performed by: NURSE PRACTITIONER

## 2018-11-29 RX ORDER — ACETAMINOPHEN 325 MG/1
975 TABLET ORAL EVERY 6 HOURS PRN
Status: DISCONTINUED | OUTPATIENT
Start: 2018-11-29 | End: 2018-12-02 | Stop reason: HOSPADM

## 2018-11-29 RX ORDER — OLANZAPINE 10 MG/1
10 TABLET, ORALLY DISINTEGRATING ORAL ONCE
Status: COMPLETED | OUTPATIENT
Start: 2018-11-29 | End: 2018-11-29

## 2018-11-29 RX ORDER — ACETAMINOPHEN 325 MG/1
650 TABLET ORAL EVERY 6 HOURS SCHEDULED
Status: DISCONTINUED | OUTPATIENT
Start: 2018-11-29 | End: 2018-11-29

## 2018-11-29 RX ORDER — MIDAZOLAM HYDROCHLORIDE 1 MG/ML
2 INJECTION INTRAMUSCULAR; INTRAVENOUS ONCE
Status: COMPLETED | OUTPATIENT
Start: 2018-11-29 | End: 2018-11-29

## 2018-11-29 RX ORDER — FUROSEMIDE 10 MG/ML
20 INJECTION INTRAMUSCULAR; INTRAVENOUS ONCE
Status: COMPLETED | OUTPATIENT
Start: 2018-11-29 | End: 2018-11-29

## 2018-11-29 RX ORDER — OLANZAPINE 2.5 MG/1
5 TABLET ORAL ONCE
Status: DISCONTINUED | OUTPATIENT
Start: 2018-11-29 | End: 2018-11-30

## 2018-11-29 RX ORDER — LISINOPRIL 10 MG/1
10 TABLET ORAL DAILY
Status: DISCONTINUED | OUTPATIENT
Start: 2018-11-30 | End: 2018-12-02 | Stop reason: HOSPADM

## 2018-11-29 RX ADMIN — METOPROLOL TARTRATE 25 MG: 25 TABLET, FILM COATED ORAL at 21:00

## 2018-11-29 RX ADMIN — TICAGRELOR 90 MG: 90 TABLET ORAL at 21:00

## 2018-11-29 RX ADMIN — LISINOPRIL 5 MG: 5 TABLET ORAL at 08:12

## 2018-11-29 RX ADMIN — INSULIN LISPRO 1 UNITS: 100 INJECTION, SOLUTION INTRAVENOUS; SUBCUTANEOUS at 17:44

## 2018-11-29 RX ADMIN — METOPROLOL TARTRATE 25 MG: 25 TABLET, FILM COATED ORAL at 08:12

## 2018-11-29 RX ADMIN — OLANZAPINE 10 MG: 10 TABLET, ORALLY DISINTEGRATING ORAL at 21:00

## 2018-11-29 RX ADMIN — NICOTINE 14 MG: 14 PATCH TRANSDERMAL at 08:14

## 2018-11-29 RX ADMIN — METRONIDAZOLE 500 MG: 500 INJECTION, SOLUTION INTRAVENOUS at 02:57

## 2018-11-29 RX ADMIN — ISODIUM CHLORIDE 3 ML: 0.03 SOLUTION RESPIRATORY (INHALATION) at 13:23

## 2018-11-29 RX ADMIN — BUSPIRONE HYDROCHLORIDE 15 MG: 5 TABLET ORAL at 00:21

## 2018-11-29 RX ADMIN — MELATONIN 6 MG: 3 TAB ORAL at 21:00

## 2018-11-29 RX ADMIN — TICAGRELOR 90 MG: 90 TABLET ORAL at 08:12

## 2018-11-29 RX ADMIN — METRONIDAZOLE 500 MG: 500 INJECTION, SOLUTION INTRAVENOUS at 09:42

## 2018-11-29 RX ADMIN — HEPARIN SODIUM 5000 UNITS: 5000 INJECTION, SOLUTION INTRAVENOUS; SUBCUTANEOUS at 13:10

## 2018-11-29 RX ADMIN — OLANZAPINE 5 MG: 10 TABLET, ORALLY DISINTEGRATING ORAL at 05:21

## 2018-11-29 RX ADMIN — OLANZAPINE 10 MG: 10 TABLET, ORALLY DISINTEGRATING ORAL at 23:02

## 2018-11-29 RX ADMIN — METRONIDAZOLE 500 MG: 500 INJECTION, SOLUTION INTRAVENOUS at 17:40

## 2018-11-29 RX ADMIN — ACETAMINOPHEN 650 MG: 325 TABLET, FILM COATED ORAL at 09:41

## 2018-11-29 RX ADMIN — BUSPIRONE HYDROCHLORIDE 15 MG: 5 TABLET ORAL at 09:42

## 2018-11-29 RX ADMIN — INSULIN LISPRO 1 UNITS: 100 INJECTION, SOLUTION INTRAVENOUS; SUBCUTANEOUS at 06:11

## 2018-11-29 RX ADMIN — MIDAZOLAM HYDROCHLORIDE 2 MG: 1 INJECTION, SOLUTION INTRAMUSCULAR; INTRAVENOUS at 23:40

## 2018-11-29 RX ADMIN — LEVALBUTEROL HYDROCHLORIDE 1.25 MG: 1.25 SOLUTION, CONCENTRATE RESPIRATORY (INHALATION) at 13:23

## 2018-11-29 RX ADMIN — HEPARIN SODIUM 5000 UNITS: 5000 INJECTION, SOLUTION INTRAVENOUS; SUBCUTANEOUS at 05:20

## 2018-11-29 RX ADMIN — INSULIN LISPRO 1 UNITS: 100 INJECTION, SOLUTION INTRAVENOUS; SUBCUTANEOUS at 13:14

## 2018-11-29 RX ADMIN — ASPIRIN 81 MG 81 MG: 81 TABLET ORAL at 08:12

## 2018-11-29 RX ADMIN — LEVALBUTEROL HYDROCHLORIDE 1.25 MG: 1.25 SOLUTION, CONCENTRATE RESPIRATORY (INHALATION) at 20:27

## 2018-11-29 RX ADMIN — TAMSULOSIN HYDROCHLORIDE 0.4 MG: 0.4 CAPSULE ORAL at 17:40

## 2018-11-29 RX ADMIN — BACITRACIN ZINC 1 LARGE APPLICATION: 500 OINTMENT TOPICAL at 17:42

## 2018-11-29 RX ADMIN — FUROSEMIDE 20 MG: 10 INJECTION, SOLUTION INTRAMUSCULAR; INTRAVENOUS at 10:55

## 2018-11-29 RX ADMIN — BACITRACIN ZINC 1 LARGE APPLICATION: 500 OINTMENT TOPICAL at 08:17

## 2018-11-29 RX ADMIN — INSULIN LISPRO 3 UNITS: 100 INJECTION, SOLUTION INTRAVENOUS; SUBCUTANEOUS at 00:20

## 2018-11-29 RX ADMIN — BUSPIRONE HYDROCHLORIDE 15 MG: 5 TABLET ORAL at 17:40

## 2018-11-29 RX ADMIN — LIDOCAINE 2 PATCH: 50 PATCH CUTANEOUS at 08:13

## 2018-11-29 RX ADMIN — ISODIUM CHLORIDE 3 ML: 0.03 SOLUTION RESPIRATORY (INHALATION) at 20:27

## 2018-11-29 RX ADMIN — HEPARIN SODIUM 5000 UNITS: 5000 INJECTION, SOLUTION INTRAVENOUS; SUBCUTANEOUS at 21:00

## 2018-11-29 RX ADMIN — ATORVASTATIN CALCIUM 40 MG: 40 TABLET, FILM COATED ORAL at 17:39

## 2018-11-29 NOTE — SOCIAL WORK
CM put in a referral to [facility] and also spoke to GENEVA via phone  KARTIK explained that pt may be going to [facility] for a cardiac procedure, but could be d/lashawn directly from there to [facility] if accepted  GENEVA will review and let KARTIK know decision

## 2018-11-29 NOTE — PROGRESS NOTES
Daily Progress Note - Critical Care/ Stepdown   Billy Zhang 66 y o  male MRN: [id number]  Unit/Bed#:  Encounter: [id number]    Assessment:   Principal Problem:    Ventricular fibrillation (HCC)  Active Problems:    Anxiety    STEMI (ST elevation myocardial infarction) ([facility] [address] )    CAD (coronary artery disease)    Cardiac arrest ([facility] )    Toxic metabolic encephalopathy  Resolved Problems:    Transaminitis    Bandemia    STEVIE (acute kidney injury) ([facility] )      Plan:    Neuro:   Encephalopathy  - waxes and wanes  - likely component of delirium as well as hypoxia related to cardiac arrest  - continue q h s  Zyprexa with attempt to normalize sleep wake cycle  - avoidance of benzodiazepines and deliriogenic medications  - frequent reorientation    CV:   Cardiac arrest secondary to STEMI status post drug-eluting stent to LAD  - continue dual anti-platelet therapy with aspirin and Brilinta  - continue beta-blocker, ACE-inhibitor, statin  - smoking cessation education  - cardiology following, appreciate recommendations    Ischemic cardiomyopathy  - ejection fraction 20-25% with severe hypokinesis of apical anterior mid anterior septal and entire inferior walls on echocardiogram following cardiac arrest on 11/23  - will need AICD placement before discharge  - will discuss with Cardiology and electrophysiology in [facility] regarding timing of this    Cardiac infusions:  None  Rhythm: NSR  Follow rhythm on telemetry    Pulm:   Aspiration pneumonia  - secondary to down time in field  - patient still with mild oxygen requirement, continue care as per below  - continue Flagyl for now, currently day 4, recommend 7 day treatment    Rib Fractures secondary to CPR  - continue Lidoderm patch  - will schedule Tylenol now that patient's LFTs normalized  - reluctant to add p r n   Robaxin secondary to encephalopathy and waxing waning mental status, but will add if pain control not improved    Acute hypoxic respiratory insufficiency  - likely secondary to aspiration pneumonia, rib fractures, atelectasis  - pain control of rib fractures as per above  - Encourage deep breathing/coughing/IS/PulmToileting   - Wean O2 for sats > 92%  GI:   Transaminitis  - resolved    Nutrition/diet plan:  Cardiac diet  Stress ulcer prophylaxis: No prophylaxis needed      :   STEVIE  - resolved  Indwelling Ramey present:  Yes   Attempt removal again today    FEN:     Fluid/Diuretic plan: No intervention  Goal 24 hour fluid balance:  Deferred  Electrolytes repleted: yes  Goal: K >4 0, Mag >2 0, and Phos >3 0    ID:   Aspiration pneumonia  - leukocytosis downtrending  - oxygen requirements still stable  Abx ordered: Flagyl  Day # 4 of to be determined day course  Trend temps and WBC count    Heme:   No issues  Trend hgb and plts  Transfuse as needed for goal hgb > 7    Endo:   Hyperglycemia  - A1c 7 6, likely underlying diabetes  - continue sliding scale insulin for now, will need diabetic regimen going forward    Glycemic control plan: Blood glucose controlled on current regimen    Msk/Skin:  Mobility goal:  Out of bed with assistance  PT consult: yes  OT consult: yes  Frequent turning and off-loading    Family:  Will update family with plan of care today  Lines:  Peripheral IVs    VTE Prophylaxis:  Pharmacologic Prophylaxis: Heparin  Mechanical Prophylaxis: sequential compression device    Disposition: Transfer to general medical floor    Code Status: Level 1 - Full Code    Counseling / Coordination of Care  Total time spent today Forty-two minutes  Greater than 50% of total time was spent with the patient and / or family counseling and / or coordination of care  A description of the counseling / coordination of care: Performing interval history and physical exam, reviewing laboratory results in interval history    Reviewing imaging result, developing plan  ______________________________________________________________________    CC: ", okay"    HPI/24hr events:   Patient still with waxing and waning mental status  Did receive Zyprexa this a m  For impulsivity  This a m , more lucid, able to state exact date, place, name  Still without knowledge of situation  Complains of some lower abdominal pain and rib pain today     ______________________________________________________________________    Review of Systems   Cardiovascular: Positive for chest pain ( rib pain )  Gastrointestinal: Positive for abdominal pain (Bilateral upper quadrants, low rib pain)  All other systems reviewed and are negative       ______________________________________________________________________    VITALS:    Vitals:    18 0000 18 0100 18 0201 18 0300   BP: 155/85 156/93 146/83 144/69   BP Location:    Left arm   Pulse: 101 85 88 91   Resp: (!) 25 21 21 20   Temp:    98 6 °F (37 °C)   TempSrc:    Oral   SpO2: 91% 92% 90% 94%   Weight:       Height:         Temp  Min: 95 °F (35 °C)  Max: 100 1 °F (37 8 °C)  IBW: 77 6 kg       Temp (24hrs), Av 9 °F (37 2 °C), Min:98 3 °F (36 8 °C), Max:99 3 °F (37 4 °C)    Temp:  [98 3 °F (36 8 °C)-99 3 °F (37 4 °C)] 98 6 °F (37 °C)  HR:  [] 91  Resp:  [19-28] 20  BP: (144-184)/(69-99) 144/69  SpO2:  [90 %-100 %] 94 %    Weights:   IBW: 77 6 kg    Body mass index is 27 48 kg/m²    Weight (last 2 days)     None          Hemodynamic Monitoring:  N/A       Non-Invasive/Invasive Ventilation Settings:  Respiratory    Lab Data (Last 4 hours)    None         O2/Vent Data (Last 4 hours)    None              Lab Results   Component Value Date    PHART 7 473 (H) 2018    KRF4GPI 32 1 (L) 2018    PO2ART 62 4 (L) 2018    HGO9JYS 23 0 2018    BEART 0 1 2018    SOURCE Artery 2018     SpO2: SpO2: 96 %, SpO2 Activity: SpO2 Activity: At Rest, SpO2 Device: O2 Device: Nasal cannula    ______________________________________________________________________    Physical Exam:   Physical Exam   Constitutional: He appears well-developed and well-nourished  No distress  HENT:   Head: Normocephalic and atraumatic  Eyes: Pupils are equal, round, and reactive to light  EOM are normal    Neck: Normal range of motion  Neck supple  No JVD present  Cardiovascular: Normal rate and regular rhythm  Exam reveals no friction rub  No murmur heard  Pulmonary/Chest: Effort normal  He has no wheezes  He has no rales  Pain in the sternum and lower ribs to palpation and deep breath   Abdominal: Soft  Bowel sounds are normal  He exhibits no distension  There is no tenderness  Genitourinary:   Genitourinary Comments: Deferred   Musculoskeletal: Normal range of motion  He exhibits no edema  Neurological: He is alert  Oriented to person, place, time  Not situation  Skin: Skin is warm and dry  Capillary refill takes less than 2 seconds  Nursing note and vitals reviewed  ______________________________________________________________________    Intake and Outputs:  I/O       11/27 0701 - 11/28 0700 11/28 0701 - 11/29 0700 11/29 0701 - 11/30 0700    P  O  760      I V  (mL/kg) 150 (1 6) 20 (0 2)     IV Piggyback 400 500     Total Intake(mL/kg) 1310 (14 3) 520 (5 7)     Urine (mL/kg/hr) 3625 (1 6) 4940 (2 2)     Total Output 3625 4940      Net -4664 -5792                     Intake/Output Summary (Last 24 hours) at 11/29/18 0729  Last data filed at 11/29/18 0539   Gross per 24 hour   Intake           520 01 ml   Output             4940 ml   Net         -4419 99 ml         Nutrition:        Diet Orders            Start     Ordered    11/27/18 2208  Diet Dysphagia/Modified Consistency; Dysphagia 3-Dental Soft; Thin Liquid  Diet effective now     Comments:  No barley or barley products   Question Answer Comment   Diet Type Dysphagia/Modified Consistency    Dysphagia/Modified Consistency Dysphagia 3-Dental Soft    Liquid Modifier Thin Liquid    RD to adjust diet per protocol?  Yes        11/27/18 9888 11/27/18 1154  Dietary nutrition supplements  Once     Question Answer Comment   Select Supplement: Ensure Enlive-Vanilla    Frequency Breakfast, Dinner        11/27/18 1153          ______________________________________________________________________    Labs:     Results from last 7 days  Lab Units 11/29/18  0539 11/28/18  0429 11/27/18  0617   WBC Thousand/uL 9 84 11 71* 12 18*   HEMOGLOBIN g/dL 12 9 12 7 10 2*   HEMATOCRIT % 37 6 37 4 30 2*   PLATELETS Thousands/uL 392* 319 245   NEUTROS PCT % 73 78* 79*   MONOS PCT % 11 10 10       Results from last 7 days  Lab Units 11/29/18  0539 11/28/18  0429 11/27/18  0617  11/22/18  1226   POTASSIUM mmol/L 3 5 3 3* 3 6  < >  --    CHLORIDE mmol/L 104 105 106  < >  --    CO2 mmol/L 23 25 21  < >  --    CO2, I-STAT mmol/L  --   --   --   --  19*   BUN mg/dL 13 11 9  < >  --    CREATININE mg/dL 0 84 0 84 0 67  < >  --    CALCIUM mg/dL 8 7 8 6 7 0*  < >  --    ALK PHOS U/L 78 69 56  < >  --    ALT U/L 65 62 58  < >  --    AST U/L 43 32 33  < >  --    GLUCOSE, ISTAT mg/dl  --   --   --   --  332*   < > = values in this interval not displayed      Results from last 7 days  Lab Units 11/29/18  0539 11/28/18  0429 11/27/18  0617   MAGNESIUM mg/dL 1 9 2 0 2 4     Lab Results   Component Value Date    PHOS 3 0 11/29/2018    PHOS 2 8 11/28/2018    PHOS 2 3 (L) 11/27/2018        Results from last 7 days  Lab Units 11/28/18  0429 11/22/18  1811 11/22/18  1514 11/22/18  1240   INR  1 23* 1 36* 2 09* 1 17   PTT seconds  --   --  84* 38       0  Lab Value Date/Time   TROPONINI 27 92 (H) 11/23/2018 0955   TROPONINI 34 56 (H) 11/23/2018 0449   TROPONINI 34 45 (H) 11/23/2018 0018   TROPONINI 33 74 (H) 11/22/2018 2019   TROPONINI 10 63 (H) 11/22/2018 1609   TROPONINI 5 20 (H) 11/22/2018 1514   TROPONINI 1 11 (H) 11/22/2018 1240       Results from last 7 days  Lab Units 11/23/18  0449 11/23/18  0018 11/22/18  1815   LACTIC ACID mmol/L 1 7 1 8 2 5*     ABG:  Lab Results   Component Value Date PHART 7 473 (H) 11/28/2018    PAN5JSV 32 1 (L) 11/28/2018    PO2ART 62 4 (L) 11/28/2018    VCW5DZU 23 0 11/28/2018    BEART 0 1 11/28/2018    SOURCE Artery 11/28/2018       Micro:  Lab Results   Component Value Date    BLOODCX No Growth After 5 Days  11/22/2018    BLOODCX No Growth After 5 Days  11/22/2018       Imaging:   I have personally reviewed pertinent films in PACS     EKG:  Deferred  ______________________________________________________________________    Allergies:    Allergies   Allergen Reactions    Barley Grass Throat Swelling       Medications:   Scheduled Meds:  Current Facility-Administered Medications:  acetaminophen 975 mg Oral Q6H PRN LALITHA Alvarado    aspirin 81 mg Oral Daily Reinaldo OatCATARINA hector    atorvastatin 40 mg Oral Daily With Gunner Chris PA-C    bacitracin 1 large application Topical BID Reinaldo Jaramillo PA-C    busPIRone 15 mg Oral BID Unknown CATARINA De Guzman    heparin (porcine) 5,000 Units Subcutaneous [facility] Unknown CATARINA De Guzman    insulin lispro 1-6 Units Subcutaneous Q6H [facility] Reinaldo Jaramillo PA-C    levalbuterol 1 25 mg Nebulization TID Bre Goel MD    lidocaine 2 patch Topical Daily Unknown CATARINA De Guzman    lisinopril 5 mg Oral Daily LALITHA De La Cruz    melatonin 6 mg Oral HS Cristian Johnson PA-C    metoprolol tartrate 25 mg Oral Q12H [facility] Reinaldo Jaramillo PA-C    metroNIDAZOLE 500 mg Intravenous Q8H LALITHA De La Cruz Last Rate: 500 mg (11/29/18 0257)   nicotine 14 mg Transdermal Daily Reinaldo Jaramillo PA-C    OLANZapine 10 mg Oral HS LALITHA [place]    OLANZapine 5 mg Oral Once Cristian Johnson PA-C    oxyCODONE 2 5 mg Oral Q4H PRN LALITHA De La Cruz    sodium chloride 3 mL Nebulization TID Bre Goel MD    tamsulosin 0 4 mg Oral Daily With [employer], CRNP    ticagrelor 90 mg Oral Q12H [facility] Nikia Stauffer MD      Continuous Infusions:   PRN Meds:    acetaminophen 975 mg Q6H PRN   oxyCODONE 2 5 mg Q4H PRN ______________________________________________________________________    Invasive lines and devices: Invasive Devices     Peripheral Intravenous Line            Peripheral IV 11/25/18 Left Antecubital 3 days    Peripheral IV 11/25/18 Right Forearm 3 days          Drain            Urethral Catheter 16 Fr  1 day                   SIGNATURE: Danie Enciso PA-C  DATE: November 29, 2018    Portions of the record may have been created with voice recognition software  Occasional wrong word or "sound a like" substitutions may have occurred due to the inherent limitations of voice recognition software  Read the chart carefully and recognize, using context, where substitutions have occurred

## 2018-11-29 NOTE — SOCIAL WORK
Cm met with pt and wife at bedside  Pt could go to [facility] on d/c, but there will be a high OOP expense  Pt has a $2,700 00 deductible and will have an additional OOP expense of $1,500 00  He will need $4,000 00+ (this is insurance allowable amt, but it could be more) before pt could be accepted  This cost is too high for pt  He is asking that a referral be placed to [address] [place] [address]  If he does not meet the criteria, wife will take him home with [facility]

## 2018-11-29 NOTE — PROGRESS NOTES
Progress Note - ICU Transfer to [place]/[facility] Kendal Zhang 66 y o  male MRN: [id number]  [facility]   Unit/Bed#:  Encounter: [id number]    Code Status: Level 1 - Full Code  POA:    POLST:      Reason for ICU admission:  Cardiac arrest, STEMI, LAD    Active problems:   Principal Problem:    Ventricular fibrillation ([facility] [address] )  Active Problems:    Anxiety    STEMI (ST elevation myocardial infarction) ([facility] [address] )    CAD (coronary artery disease)    Cardiac arrest ([facility] [address] )    Toxic metabolic encephalopathy  Resolved Problems:    Transaminitis    Bandemia    STEVIE (acute kidney injury) [facility])      Consultants:  Cardiology, Urology    History of Present Illness:   53-year-old male who presented on 11/22/2018 around mid day after a witnessed cardiac arrest, patient had about 8 minutes bystander CPR until place arrived and took over  Was placed on AED and 2 shocks were delivered  On EMS arrival, patient was found in VFib arrest   EN route, patient had additional 6 shocks, 6 doses of epinephrine, 300 mg of amiodarone and started on amiodarone drip  Each pulse check showed either VFib ir V-tach, final pulse check showed sinus tachycardia with ST elevation  Patient was moving all extremities  He was intubated in the emergency department and taken to cardiac cath lab  In cardiac cath lab, patient was found to have LAD lesion that was stented  Was reloaded with Brilinta, given Angiomax, started on kangreal drip  Was transported back to the intensive care unit intubated and sedated on propofol    Summary of clinical course:   Prior to this, patient had no real past medical history  Was on BuSpar for anxiety  Patient did not require any antihypertensives or vasopressors after cath lab  Patient was extubated on 11/23  Immediately following extubation, patient very impulsive and confused and needed restraints    He had an echocardiogram  that showed an ejection fraction of 20-25%, severe hypokinesis of the apical anterior, mid anterior septal and entire inferior walls and akinesis of the mid inferior septal walls with thinning of apex and mid inferior septum  Due to this, is recommend the patient eventually undergo AICD placement prior to discharge  Since extubation, patient did have fevers and increased white blood cell count, therefore he was started on Flagyl for potential aspiration pneumonia  He should continue this for total of 7 days  Will also began diuresing patient  He diuresed for min distally from 20 IV Lasix yesterday, he was given another 20 today  Patient's impulsivity has been a continued issue  It is likely that patient had slight anoxic injury during down time as well as has ICU delirium  Patient was started on Zyprexa q h s  To help with sleep wake hygiene  Patient has also had issues with urinary retention  Urology consultation was obtained  They recommended continued Ramey for 4-5 days, continuing Flomax, and trialing discontinuation      Recent or scheduled procedures:   11/22:  Cardiac catheterization:  LAD stented      Outstanding/pending diagnostics:   Ramey discontinuation in 4-5 days  Prior to discharge, AICD placement in [place]    Cultures:   Blood cultures:  No growth at 5 days       Mobilization Plan:  Out of bed with assistance    Nutrition Plan:  Cardiac    Discharge Plan:   Patient should be ready for discharge to [facility] for AICD placement in next 24-48 hours after optimization of respiratory status     Initial Physical Therapy Recommendations:  Acute rehab  Initial Occupational Therapy Recommendations:  Acute rehab  Initial /Plan:  Referral to [facility] placed    Home medications that are not reordered and reason why:   None     Specific Diagnosis Plan:    Cardiac arrest secondary to STEMI of LAD  - patient continues on dual anti-platelet therapy, beta-blocker, ACE-inhibitor, statin  - ACE-inhibitor dose increased today secondary to some hypertension  - hemodynamically stable    Encephalopathy  - likely multifactorial, anoxia during cardiac arrest, delirium from ICU stay  - we placed patient on Zyprexa q h s  And will decrease stimulation at night in hopes to regulate sleep wake cycle  - frequent reorientation  - avoidance of benzodiazepines    Ischemic cardiomyopathy with acute systolic heart failure  - patient given 20 mg of IV Lasix last evening and again this morning  - will need standing dose likely, if gets good result from 20, could start 20 p o  Daily  - will need AICD prior to discharge    Acute hypoxic respiratory failure  - multifactorial, atelectasis secondary to broken ribs and chest pain, as well as aspiration pneumonia  - aggressive pulmonary toileting, vest, flutter valve, respiratory airway clearance protocol  - multi facet pain approach for rib fractures  - antibiotics    Urinary retention  - started on Flomax  - urology consultation  - will need Ramey catheter removed in 4-5 days    New onset diabetes type 2  - controlled on sliding scale insulin    Aspiration pneumonia  - continue Flagyl for total of 7 days    Spoke with Dr Helio Ho  regarding transfer  Please call [phone] with any questions or concerns  Portions of the record may have been created with voice recognition software  Occasional wrong word or "sound a like" substitutions may have occurred due to the inherent limitations of voice recognition software  Read the chart carefully and recognize, using context, where substitutions have occurred      Kedar Kendall PA-C

## 2018-11-29 NOTE — SPEECH THERAPY NOTE
Speech/Language Pathology Progress Note    Patient Name: Fortino Angela  [id number] Date: 11/29/2018       Subjective:  D/w FRANTZ Velasquez, pt dislikes quality of food but has been tolerating meals well, was asking for diet liberalization earlier in the day  Wife Doris Max present for session  Pt seated upright out of bed in chair, A+A, confused, attempting to remove all lines and devices, including telemetry monitor, pulse oximeter, chest PT vest, and mask for breathing treatment; SLP and wife spent several minutes attempting to redirect pt  SLP removed chest PT vest (treatment completed per machine display) and pt became less agitated, although still required frequent redirection  Objective:  Pt more confused today, frequently interrupting other speakers, consistently tangential, often objecting to presented information and "correcting" it with nonsense statements  However, with consistent cueing from SLP and wife, pt was able to respond to simple/concrete questions  He stated that he would like "something with a crack to it"; on further investigation, it became clearer that the pt was requesting crunchy foods, such as potato chips  SLP reminded pt that he had requested to remain on soft foods yesterday, and asked whether he ate such foods at home  Pt said "I can eat them if I have a drink, or if I suck on them to get them soft and then eat them " Pt's wife confirmed that he does do this at home and does not have difficulties  Thin liquids via straw WNL  Regular solids (dry Cheerios) p/w pt sucking briefly on small bolus, then chewing adequately with remainder of swallow WNL  Assessment:  Pt tolerated trials of Regular solids at b/s  Plan/Recommendations:  Recommend diet advance to Regular/Thin Liquid diet  ST will follow up briefly for dysphagia tx

## 2018-11-29 NOTE — CONSULTS
UROLOGY CONSULTATION NOTE     Patient Identifiers: Christian Velasquez (MRN [id number])  Service Requesting Consultation:  Critical care medicine  Service Providing Consultation:  Urology, Esha Lugo PA-C    Date of Service: 11/29/2018    Reason for Consultation:  Urinary retention    History of Present Illness:     Christian Velasquez is a 66 y o  old male who presented to the hospital via ambulance 11/22/2018 in cardiac arrest requiring cardiac catheterization  Patient has been demonstrating some toxic encephalopathy afterwards with confusion and agitation  Patient has been working with physical therapy due to decreased strength decreased endurance, and decreased mobility  Patient's discharge is likely to a short-term rehab as per review of case management documentation  Urology was consulted at due to patient having a bladder scan of 999 mL  A Ramey catheter was inserted 1200 mL on 11/28  Patient was initiated on tamsulosin yesterday, 11/28/2018  Previously was straight catheterized 11/24/18 for bladder scan of 350 mL with return of 400 mL, as well as on 11/23/18 for a bladder scan of 350 mL with a return of 500 mL  Difficult to discern whether these were postvoid residuals versus bladder scans  Patient states he was urinating small amounts at that time  He denies any dysuria, gross hematuria, suprapubic pressure, flank pain, or urinary infections previously  Patient denies ever having any urologic issues previously  He denies any history of genitourinary surgical manipulation  Patient denies any issues with urination at baseline  He denies any issues with Ramey catheter at this time  He does report having a bowel movement yesterday, however does admit to decreased ambulation at this time  Patient does have a Lidoderm patch for pain       Patient's creatinine has remained stable between 0 6 and 0 8    Urinalysis microscopy was obtained 11/22/2018 which was leukocyte nitrite negative  Microscopic hematuria was noted at that time with 2-4 RBCs per high-power field  Patient was intermittently confused throughout examination, however also responded to questions at urination appropriately        Medications, Allergies:     Current Facility-Administered Medications:     acetaminophen (TYLENOL) tablet 975 mg, 975 mg, Oral, Q6H PRN, LALITHA De La Cruz    aspirin chewable tablet 81 mg, 81 mg, Oral, Daily, Tammy Hilton PA-C, 81 mg at 11/28/18 0846    atorvastatin (LIPITOR) tablet 40 mg, 40 mg, Oral, Daily With Nate Overcast, PA-C, 40 mg at 11/28/18 1624    bacitracin topical ointment 1 large application, 1 large application, Topical, BID, LEAH Dumont-C, 1 large application at 99/00/95 1844    busPIRone (BUSPAR) tablet 15 mg, 15 mg, Oral, BID, Johanna Godinez PA-C, 15 mg at 11/29/18 0021    heparin (porcine) subcutaneous injection 5,000 Units, 5,000 Units, Subcutaneous, Q8H [address], Johanna Godinez PA-C, 5,000 Units at 11/29/18 0520    insulin lispro (HumaLOG) 100 units/mL subcutaneous injection 1-6 Units, 1-6 Units, Subcutaneous, Q6H [address], 1 Units at 11/29/18 0611 **AND** Fingerstick Glucose (POCT), , , Q6H, LEAH Dumont-C    levalbuterol [facility]) inhalation solution 1 25 mg, 1 25 mg, Nebulization, TID, Deandra Mckeon MD, 1 25 mg at 11/28/18 1954    lidocaine (LIDODERM) 5 % patch 2 patch, 2 patch, Topical, Daily, LEAH Medrano-EL, 2 patch at 11/28/18 0846    lisinopril (ZESTRIL) tablet 5 mg, 5 mg, Oral, Daily, LALITHA De La Cruz, 5 mg at 11/28/18 0846    melatonin tablet 6 mg, 6 mg, Oral, HS, Toshia Dopp PA-C, 6 mg at 11/27/18 2240    metoprolol tartrate (LOPRESSOR) tablet 25 mg, 25 mg, Oral, Q12H [address], [place] CATARINA Hilton, 25 mg at 11/28/18 0846    metroNIDAZOLE (FLAGYL) IVPB (premix) 500 mg, 500 mg, Intravenous, Q8H, LALITHA De La Cruz, Last Rate: 200 mL/hr at 11/29/18 0257, 500 mg at 11/29/18 0257    nicotine (NICODERM CQ) 14 mg/24hr TD 24 hr patch 14 mg, 14 mg, Transdermal, Daily, Yu Boykin PA-C, 14 mg at 11/28/18 0846    OLANZapine (ZyPREXA ZYDIS) dispersible tablet 10 mg, 10 mg, Oral, HS, Ale Cools, CRNP, 5 mg at 11/29/18 0521    OLANZapine (ZyPREXA) tablet 5 mg, 5 mg, Oral, Once, Hector Wahl PA-C    oxyCODONE (ROXICODONE) IR tablet 2 5 mg, 2 5 mg, Oral, Q4H PRN, LALITHA De La Cruz, 2 5 mg at 11/27/18 2246    sodium chloride 0 9 % inhalation solution 3 mL, 3 mL, Nebulization, TID, Landon Damon MD, 3 mL at 11/28/18 1954    tamsulosin (FLOMAX) capsule 0 4 mg, 0 4 mg, Oral, Daily With Dinner, LALITHA De La Cruz, 0 4 mg at 11/28/18 1624    ticagrelor (BRILINTA) tablet 90 mg, 90 mg, Oral, Q12H [address], Quinten Schultz MD, 90 mg at 11/28/18 0846    Allergies: Allergies   Allergen Reactions    Barley Grass Throat Swelling   :    Social and Family History:   Social History:   Social History   Substance Use Topics    Smoking status: Current Every Day Smoker    Smokeless tobacco: Not on file    Alcohol use No        History   Smoking Status    Current Every Day Smoker   Smokeless Tobacco    Not on file       Family History:  History reviewed  No pertinent family history :     Review of Systems:     General: Fever, chills, or night sweats: negative  Cardiac: positive for intermittent for chest pain  Pulmonary: Negative for shortness of breath  Gastrointestinal: Abdominal pain negative  Nausea, vomiting, or diarrhea negative,  Genitourinary: See HPI above  Patient does not have hematuria  All other systems queried were negative  Physical Exam:   General: Patient is pleasant and in NAD  Awake and alert  /69 (BP Location: Left arm)   Pulse 91   Temp 98 6 °F (37 °C) (Oral)   Resp 20   Ht 6' (1 829 m)   Wt 91 9 kg (202 lb 9 6 oz)   SpO2 94%   BMI 27 48 kg/m²   Cardiac: Peripheral edema: negative  Pulmonary: Non-labored breathing  Abdomen: Soft, non-tender, non-distended      Genitourinary: Negative CVA tenderness, negative suprapubic tenderness  (Male): Penis circumcised, phallus normal, meatus patent  Testicles descended into scrotum bilaterally without masses nor tenderness  HERNANDEZ:  In place draining clear yellow urine    Labs:     Lab Results   Component Value Date    HGB 12 9 11/29/2018    HCT 37 6 11/29/2018    WBC 9 84 11/29/2018     (H) 11/29/2018   ]    Lab Results   Component Value Date    K 3 5 11/29/2018     11/29/2018    CO2 23 11/29/2018    BUN 13 11/29/2018    CREATININE 0 84 11/29/2018    CALCIUM 8 7 11/29/2018    GLUCOSE 332 (H) 11/22/2018   ]    ASSESSMENT/PLAN:      1) Urinary retention  - likely multifactorial in etiology including recent sedation, Lidoderm patch, encephalopathy, and decreased ambulation  - patient was initiated on tamsulosin 0 4 milligrams once daily yesterday after catheter placement  Patient should be maintained on this medication upon discharge as well   - given patient's larger volume urinary retention with > 1 liter urine drainage when catheter was placed, recommend maintaining indwelling Hernandez catheter over the next 4-5 days  As patient becomes more ambulatory and improved mentation, recommend voiding trial   - if patient fails voiding trial with postvoid residual > 250 milliliters, recommend reinserting Hernandez catheter with a repeat voiding trial while in short-term rehab  Should patient continue to fail voiding trials thereafter, he will require cystoscopy in our office for further evaluation of bladder outlet obstruction  No further urologic interventions warranted at this time  Thank you for allowing me to participate in this patients care  Please do not hesitate to call with any additional questions    Alla Hodges PA-C

## 2018-11-29 NOTE — PLAN OF CARE
Problem: DISCHARGE PLANNING - CARE MANAGEMENT  Goal: Discharge to post-acute care or home with appropriate resources  INTERVENTIONS:  - Conduct assessment to determine patient/family and health care team treatment goals, and need for post-acute services based on payer coverage, community resources, and patient preferences, and barriers to discharge  - Address psychosocial, clinical, and financial barriers to discharge as identified in assessment in conjunction with the patient/family and health care team  - Arrange appropriate level of post-acute services according to patients   needs and preference and payer coverage in collaboration with the physician and health care team  - Communicate with and update the patient/family, physician, and health care team regarding progress on the discharge plan  - Arrange appropriate transportation to post-acute venues   Outcome: Progressing  Cm met with pt and wife at bedside  Pt could go to [facility] on d/c, but there will be a high OOP expense  Pt has a $2,700 00 deductible and will have an additional OOP expense of $1,500 00  He will need $4,000 00+ (this is insurance allowable amt, but it could be more) before pt could be accepted  This cost is too high for pt  He is asking that a referral be placed to [facility]  If he does not meet the criteria, wife will take him home with [facility]

## 2018-11-29 NOTE — PLAN OF CARE
Problem: DISCHARGE PLANNING - CARE MANAGEMENT  Goal: Discharge to post-acute care or home with appropriate resources  INTERVENTIONS:  - Conduct assessment to determine patient/family and health care team treatment goals, and need for post-acute services based on payer coverage, community resources, and patient preferences, and barriers to discharge  - Address psychosocial, clinical, and financial barriers to discharge as identified in assessment in conjunction with the patient/family and health care team  - Arrange appropriate level of post-acute services according to patients   needs and preference and payer coverage in collaboration with the physician and health care team  - Communicate with and update the patient/family, physician, and health care team regarding progress on the discharge plan  - Arrange appropriate transportation to post-acute venues   Outcome: Progressing  CM put in a referral to [facility] and also spoke to GENEVA via phone  CM explained that pt may be going to [facility] for a cardiac procedure, but could be d/lashawn directly from there to [facility] if accepted  GENEVA will review and let CM know decision

## 2018-11-29 NOTE — PROGRESS NOTES
Progress Note - Cardiology   Billy Zhang 66 y o  male MRN: [id number]  Unit/Bed#:  Encounter: [id number]    Assessment:  1  Post SCA encephalopathy  2  S/P Vfib cardiac arrest  3  S/P STEMI  4  S/P CRUZ to LAD  5  Ischemic cardiomyopathy    Plan:  1  Continue current meds  Very stable from cardiovascular standpoint  2  ICD implant as part of discharge planning  Dr Gato Rust (EP)  has been notified about patient, will work with us  Subjective/Objective   Chief Complaint: None    Subjective: No chest pain    Objective: Confused  Agitated  Vitals: /69 (BP Location: Left arm)   Pulse 91   Temp 98 6 °F (37 °C) (Oral)   Resp 20   Ht 6' (1 829 m)   Wt 91 9 kg (202 lb 9 6 oz)   SpO2 94%   BMI 27 48 kg/m²   Vitals:    11/24/18 0552 11/25/18 0600   Weight: 99 7 kg (219 lb 12 8 oz) 91 9 kg (202 lb 9 6 oz)     Orthostatic Blood Pressures      Most Recent Value   Blood Pressure  144/69 filed at 11/29/2018 0300   Patient Position - Orthostatic VS  Lying filed at 11/29/2018 0300            Intake/Output Summary (Last 24 hours) at 11/29/18 2381  Last data filed at 11/29/18 0539   Gross per 24 hour   Intake           520 01 ml   Output             4940 ml   Net         -4419 99 ml       Invasive Devices     Peripheral Intravenous Line            Peripheral IV 11/25/18 Left Antecubital 3 days    Peripheral IV 11/25/18 Right Forearm 3 days          Drain            Urethral Catheter 16 Fr  1 day                Review of Systems: Cardiovascular ROS: no chest pain or dyspnea on exertion    Physical Exam:   Cardiovascular: Normal rate, regular rhythm and normal heart sounds     No murmur heard    Pulmonary/Chest: Breath sounds normal      Lab Results:   CBC with diff:   Results from last 7 days  Lab Units 11/29/18  0539   WBC Thousand/uL 9 84   RBC Million/uL 4 30   HEMOGLOBIN g/dL 12 9   HEMATOCRIT % 37 6   MCV fL 87   MCH pg 30 0   MCHC g/dL 34 3   RDW % 13 5   MPV fL 9 8   PLATELETS Thousands/uL 392* CMP:   Results from last 7 days  Lab Units 11/29/18  0539  11/22/18  1226   SODIUM mmol/L 139  < >  --    POTASSIUM mmol/L 3 5  < >  --    CHLORIDE mmol/L 104  < >  --    CO2 mmol/L 23  < >  --    CO2, I-STAT mmol/L  --   --  19*   BUN mg/dL 13  < >  --    CREATININE mg/dL 0 84  < >  --    GLUCOSE, ISTAT mg/dl  --   --  332*   CALCIUM mg/dL 8 7  < >  --    AST U/L 43  < >  --    ALT U/L 65  < >  --    ALK PHOS U/L 78  < >  --    EGFR ml/min/1 73sq m 98  < > 57   < > = values in this interval not displayed  Troponin:   0  Lab Value Date/Time   TROPONINI 27 92 (H) 11/23/2018 0955   TROPONINI 34 56 (H) 11/23/2018 0449   TROPONINI 34 45 (H) 11/23/2018 0018   TROPONINI 33 74 (H) 11/22/2018 2019   TROPONINI 10 63 (H) 11/22/2018 1609   TROPONINI 5 20 (H) 11/22/2018 1514   TROPONINI 1 11 (H) 11/22/2018 1240     BNP:   Results from last 7 days  Lab Units 11/29/18  0539  11/22/18  1226   POTASSIUM mmol/L 3 5  < >  --    CHLORIDE mmol/L 104  < >  --    CO2 mmol/L 23  < >  --    CO2, I-STAT mmol/L  --   --  19*   BUN mg/dL 13  < >  --    CREATININE mg/dL 0 84  < >  --    GLUCOSE, ISTAT mg/dl  --   --  332*   CALCIUM mg/dL 8 7  < >  --    EGFR ml/min/1 73sq m 98  < > 57   < > = values in this interval not displayed  Coags:   Results from last 7 days  Lab Units 11/28/18  0429  11/22/18  1514   PTT seconds  --   --  84*   INR  1 23*  < > 2 09*   < > = values in this interval not displayed  TSH:   Results from last 7 days  Lab Units 11/22/18  1514   TSH 3RD GENERATON uIU/mL 5 993*     Magnesium:   Results from last 7 days  Lab Units 11/29/18  0539   MAGNESIUM mg/dL 1 9     Lipid Profile:   Results from last 7 days  Lab Units 11/22/18  1514   HDL mg/dL 37*   LDL CALC mg/dL 81   TRIGLYCERIDES mg/dL 117     Imaging: I have personally reviewed pertinent reports  EKG:   VTE Pharmacologic Prophylaxis:   VTE Mechanical Prophylaxis:     Counseling / Coordination of Care  Total time spent today 30 minutes   Greater than 50% of total time was spent with the patient and / or family counseling and / or coordination of care   A description of the counseling / coordination of care: 30

## 2018-11-30 LAB
ANION GAP SERPL CALCULATED.3IONS-SCNC: 11 MMOL/L (ref 4–13)
ATRIAL RATE: 58 BPM
ATRIAL RATE: 67 BPM
BASOPHILS # BLD AUTO: 0.03 THOUSANDS/ΜL (ref 0–0.1)
BASOPHILS NFR BLD AUTO: 0 % (ref 0–1)
BUN SERPL-MCNC: 22 MG/DL (ref 5–25)
CALCIUM SERPL-MCNC: 8.6 MG/DL (ref 8.3–10.1)
CHLORIDE SERPL-SCNC: 104 MMOL/L (ref 100–108)
CO2 SERPL-SCNC: 24 MMOL/L (ref 21–32)
CREAT SERPL-MCNC: 1.07 MG/DL (ref 0.6–1.3)
EOSINOPHIL # BLD AUTO: 0.28 THOUSAND/ΜL (ref 0–0.61)
EOSINOPHIL NFR BLD AUTO: 3 % (ref 0–6)
ERYTHROCYTE [DISTWIDTH] IN BLOOD BY AUTOMATED COUNT: 13.4 % (ref 11.6–15.1)
GFR SERPL CREATININE-BSD FRML MDRD: 77 ML/MIN/1.73SQ M
GLUCOSE SERPL-MCNC: 149 MG/DL (ref 65–140)
GLUCOSE SERPL-MCNC: 156 MG/DL (ref 65–140)
GLUCOSE SERPL-MCNC: 174 MG/DL (ref 65–140)
GLUCOSE SERPL-MCNC: 202 MG/DL (ref 65–140)
GLUCOSE SERPL-MCNC: 214 MG/DL (ref 65–140)
GLUCOSE SERPL-MCNC: 215 MG/DL (ref 65–140)
HCT VFR BLD AUTO: 34.8 % (ref 36.5–49.3)
HGB BLD-MCNC: 11.8 G/DL (ref 12–17)
IMM GRANULOCYTES # BLD AUTO: 0.12 THOUSAND/UL (ref 0–0.2)
IMM GRANULOCYTES NFR BLD AUTO: 1 % (ref 0–2)
LYMPHOCYTES # BLD AUTO: 1.34 THOUSANDS/ΜL (ref 0.6–4.47)
LYMPHOCYTES NFR BLD AUTO: 13 % (ref 14–44)
MAGNESIUM SERPL-MCNC: 1.9 MG/DL (ref 1.6–2.6)
MCH RBC QN AUTO: 29.9 PG (ref 26.8–34.3)
MCHC RBC AUTO-ENTMCNC: 33.9 G/DL (ref 31.4–37.4)
MCV RBC AUTO: 88 FL (ref 82–98)
MONOCYTES # BLD AUTO: 1.17 THOUSAND/ΜL (ref 0.17–1.22)
MONOCYTES NFR BLD AUTO: 11 % (ref 4–12)
NEUTROPHILS # BLD AUTO: 7.75 THOUSANDS/ΜL (ref 1.85–7.62)
NEUTS SEG NFR BLD AUTO: 72 % (ref 43–75)
NRBC BLD AUTO-RTO: 0 /100 WBCS
P AXIS: 28 DEGREES
P AXIS: 32 DEGREES
PLATELET # BLD AUTO: 349 THOUSANDS/UL (ref 149–390)
PMV BLD AUTO: 9.5 FL (ref 8.9–12.7)
POTASSIUM SERPL-SCNC: 3.9 MMOL/L (ref 3.5–5.3)
PR INTERVAL: 132 MS
PR INTERVAL: 140 MS
QRS AXIS: 19 DEGREES
QRS AXIS: 21 DEGREES
QRSD INTERVAL: 94 MS
QRSD INTERVAL: 96 MS
QT INTERVAL: 466 MS
QT INTERVAL: 474 MS
QTC INTERVAL: 457 MS
QTC INTERVAL: 500 MS
RBC # BLD AUTO: 3.95 MILLION/UL (ref 3.88–5.62)
SODIUM SERPL-SCNC: 139 MMOL/L (ref 136–145)
T WAVE AXIS: 25 DEGREES
T WAVE AXIS: 55 DEGREES
VENTRICULAR RATE: 58 BPM
VENTRICULAR RATE: 67 BPM
WBC # BLD AUTO: 10.69 THOUSAND/UL (ref 4.31–10.16)

## 2018-11-30 PROCEDURE — 93005 ELECTROCARDIOGRAM TRACING: CPT

## 2018-11-30 PROCEDURE — 82948 REAGENT STRIP/BLOOD GLUCOSE: CPT

## 2018-11-30 PROCEDURE — 93010 ELECTROCARDIOGRAM REPORT: CPT | Performed by: INTERNAL MEDICINE

## 2018-11-30 PROCEDURE — 80048 BASIC METABOLIC PNL TOTAL CA: CPT | Performed by: PHYSICIAN ASSISTANT

## 2018-11-30 PROCEDURE — 83735 ASSAY OF MAGNESIUM: CPT | Performed by: PHYSICIAN ASSISTANT

## 2018-11-30 PROCEDURE — 85025 COMPLETE CBC W/AUTO DIFF WBC: CPT | Performed by: PHYSICIAN ASSISTANT

## 2018-11-30 RX ORDER — FUROSEMIDE 20 MG/1
20 TABLET ORAL DAILY
Status: DISCONTINUED | OUTPATIENT
Start: 2018-11-30 | End: 2018-12-02 | Stop reason: HOSPADM

## 2018-11-30 RX ORDER — MAGNESIUM SULFATE HEPTAHYDRATE 40 MG/ML
2 INJECTION, SOLUTION INTRAVENOUS ONCE
Status: COMPLETED | OUTPATIENT
Start: 2018-11-30 | End: 2018-11-30

## 2018-11-30 RX ORDER — POTASSIUM CHLORIDE 14.9 MG/ML
20 INJECTION INTRAVENOUS ONCE
Status: COMPLETED | OUTPATIENT
Start: 2018-11-30 | End: 2018-11-30

## 2018-11-30 RX ADMIN — FUROSEMIDE 20 MG: 20 TABLET ORAL at 15:26

## 2018-11-30 RX ADMIN — INSULIN LISPRO 2 UNITS: 100 INJECTION, SOLUTION INTRAVENOUS; SUBCUTANEOUS at 05:54

## 2018-11-30 RX ADMIN — METRONIDAZOLE 500 MG: 500 INJECTION, SOLUTION INTRAVENOUS at 14:38

## 2018-11-30 RX ADMIN — BACITRACIN ZINC 1 LARGE APPLICATION: 500 OINTMENT TOPICAL at 09:59

## 2018-11-30 RX ADMIN — MAGNESIUM SULFATE IN WATER 2 G: 40 INJECTION, SOLUTION INTRAVENOUS at 14:39

## 2018-11-30 RX ADMIN — OLANZAPINE 10 MG: 10 TABLET, ORALLY DISINTEGRATING ORAL at 21:58

## 2018-11-30 RX ADMIN — TAMSULOSIN HYDROCHLORIDE 0.4 MG: 0.4 CAPSULE ORAL at 15:23

## 2018-11-30 RX ADMIN — METRONIDAZOLE 500 MG: 500 INJECTION, SOLUTION INTRAVENOUS at 01:35

## 2018-11-30 RX ADMIN — LIDOCAINE 2 PATCH: 50 PATCH CUTANEOUS at 10:44

## 2018-11-30 RX ADMIN — TICAGRELOR 90 MG: 90 TABLET ORAL at 21:47

## 2018-11-30 RX ADMIN — ATORVASTATIN CALCIUM 40 MG: 40 TABLET, FILM COATED ORAL at 15:24

## 2018-11-30 RX ADMIN — METOPROLOL TARTRATE 25 MG: 25 TABLET, FILM COATED ORAL at 21:48

## 2018-11-30 RX ADMIN — INSULIN LISPRO 2 UNITS: 100 INJECTION, SOLUTION INTRAVENOUS; SUBCUTANEOUS at 00:55

## 2018-11-30 RX ADMIN — BUSPIRONE HYDROCHLORIDE 15 MG: 5 TABLET ORAL at 17:22

## 2018-11-30 RX ADMIN — HEPARIN SODIUM 5000 UNITS: 5000 INJECTION, SOLUTION INTRAVENOUS; SUBCUTANEOUS at 15:05

## 2018-11-30 RX ADMIN — HEPARIN SODIUM 5000 UNITS: 5000 INJECTION, SOLUTION INTRAVENOUS; SUBCUTANEOUS at 21:47

## 2018-11-30 RX ADMIN — NICOTINE 14 MG: 14 PATCH TRANSDERMAL at 10:44

## 2018-11-30 RX ADMIN — MELATONIN 6 MG: 3 TAB ORAL at 21:47

## 2018-11-30 RX ADMIN — LISINOPRIL 10 MG: 10 TABLET ORAL at 15:23

## 2018-11-30 RX ADMIN — DEXMEDETOMIDINE HYDROCHLORIDE 0.7 MCG/KG/HR: 100 INJECTION, SOLUTION INTRAVENOUS at 04:44

## 2018-11-30 RX ADMIN — DEXMEDETOMIDINE HYDROCHLORIDE 0.7 MCG/KG/HR: 100 INJECTION, SOLUTION INTRAVENOUS at 02:46

## 2018-11-30 RX ADMIN — HEPARIN SODIUM 5000 UNITS: 5000 INJECTION, SOLUTION INTRAVENOUS; SUBCUTANEOUS at 05:54

## 2018-11-30 RX ADMIN — INSULIN LISPRO 1 UNITS: 100 INJECTION, SOLUTION INTRAVENOUS; SUBCUTANEOUS at 12:50

## 2018-11-30 RX ADMIN — DEXMEDETOMIDINE HYDROCHLORIDE 0.7 MCG/KG/HR: 100 INJECTION, SOLUTION INTRAVENOUS at 00:43

## 2018-11-30 RX ADMIN — POTASSIUM CHLORIDE 20 MEQ: 14.9 INJECTION, SOLUTION INTRAVENOUS at 14:38

## 2018-11-30 RX ADMIN — METRONIDAZOLE 500 MG: 500 INJECTION, SOLUTION INTRAVENOUS at 18:30

## 2018-11-30 RX ADMIN — BACITRACIN ZINC 1 LARGE APPLICATION: 500 OINTMENT TOPICAL at 17:23

## 2018-11-30 RX ADMIN — DEXMEDETOMIDINE HYDROCHLORIDE 1.2 MCG/KG/HR: 100 INJECTION, SOLUTION INTRAVENOUS at 00:17

## 2018-11-30 NOTE — PROGRESS NOTES
Patient found hanging out of the bed  Patient disorieneted to place, time and situation  Provider at bedside  Patient repositioned and pulled up in bed  Soft limb restraints applied for patient's safety  Will continue to monitor

## 2018-11-30 NOTE — RESPIRATORY THERAPY NOTE
Attempted one more time to give patient breathing treatment  Patient again swatted at me and the breathing treatment  Notified Rn of patient's  non compliance

## 2018-11-30 NOTE — SPEECH THERAPY NOTE
Speech-Language Pathology Attempt Note    D/w CNRP Rebecca Mandujano, pt had a change in mental status overnight, received Versed, and has now been somnolent throughout the day today  He has not been eating by mouth today and is not currently able to participate in [facility]   Will continue to follow

## 2018-11-30 NOTE — SOCIAL WORK
CM spoke to Jimena Wilks from [facility] for assistance with this pt  She will review insurance info and will advise accordingly

## 2018-11-30 NOTE — UTILIZATION REVIEW
[address]  Utilization Review Department  Phone: [phone]; Fax [phone]  [email or web address]  ATTENTION: Please call with any questions or concerns to [phone]  and carefully listen to the prompts so that you are directed to the right person  Send all requests for admission clinical reviews, approved or denied determinations and any other requests to fax [phone]  All voicemails are confidential     Continued Stay Review    Date: 11/30/18   INPATIENT     Age/Sex: 66 y o  male initially admitted to ICU on 11/22/18 after v fib arrest 2nd to STEMI, was intubated/vented, extubated on 11/23/18  Assessment/Plan: needs ICU-possible implant to be done on Monday, 12/3  Has been disoriented to place/time/situation, and impulsive  Likely that pt had slight anoxic injury during downtiem, as well as ICU delirium  Soft limb restraints applied for safety  Has been on flagyl due to fever and increased wbc after aspiration for possible aspiration pneumonia  Diuresis underway with IV lasix  Urology was consulted due to urinary retention-to maintain lizarraga x 4-5 days, continue flomax      Vital Signs: BP 92/51 (BP Location: Right arm)   Pulse 59   Temp (!) 97 4 °F (36 3 °C) (Axillary)   Resp 15   Ht 6' (1 829 m)   Wt 92 6 kg (204 lb 2 3 oz)   SpO2 94%   BMI 27 69 kg/m²     Medications:   Scheduled Meds:   Current Facility-Administered Medications:  acetaminophen 975 mg Oral Q6H PRN   aspirin 81 mg Oral Daily   atorvastatin 40 mg Oral Daily With Dinner   bacitracin 1 large application Topical BID   busPIRone 15 mg Oral BID   furosemide 20 mg Oral Daily   heparin (porcine) 5,000 Units Subcutaneous Q8H [address]   insulin lispro 1-6 Units Subcutaneous Q6H [address]   levalbuterol 1 25 mg Nebulization TID   lidocaine 2 patch Topical Daily   lisinopril 10 mg Oral Daily   magnesium sulfate 2 g Intravenous Once   melatonin 6 mg Oral HS   metoprolol tartrate 25 mg Oral Q12H [address] metroNIDAZOLE 500 mg Intravenous Q8H   nicotine 14 mg Transdermal Daily   OLANZapine 10 mg Oral HS   oxyCODONE 2 5 mg Oral Q4H PRN   potassium chloride 20 mEq Intravenous Once   sodium chloride 3 mL Nebulization TID   tamsulosin 0 4 mg Oral Daily With Dinner   ticagrelor 90 mg Oral Q12H [facility]   Abnormal Labs/Diagnostic Results:   11/30: gluc 202, 214, 215, 156  Wbc 10 69   hgb 11 8  hct 34 8  11/29: PCXR: 1   Left basilar opacity compatible with small effusion and a component of atelectasis or infection  2   Stable mild cardiomegaly      Discharge Plan: needs STR, referrals in progress, prefers [facility]; however, out of pocket cost is too high for patient to afford  Requesting referral to [facility]  If does not meet criteria for Counts include 234 beds at the [facility] Offer, wife will take patient home with home health services

## 2018-11-30 NOTE — PROGRESS NOTES
Progress Note - Cardiology   Billy Zhang 66 y o  male MRN: [id number]  Unit/Bed#:  Encounter: [id number]    Assessment:  1  Post SCA encephalopathy  2  S/P Vfib cardiac arrest  3  S/P STEMI  4  S/P CRUZ to LAD  5  Ischemic cardiomyopathy    Plan:  1  Continue current meds  Very stable from cardiovascular standpoint  2  ICD implant as part of discharge planning  Subjective/Objective   Chief Complaint: None    Subjective: More oriented today    Objective: No chest pain    Vitals: /57 (BP Location: Right arm)   Pulse 99   Temp 98 2 °F (36 8 °C) (Oral)   Resp 21   Ht 6' (1 829 m)   Wt 91 9 kg (202 lb 9 6 oz)   SpO2 94%   BMI 27 48 kg/m²   Vitals:    11/24/18 0552 11/25/18 0600   Weight: 99 7 kg (219 lb 12 8 oz) 91 9 kg (202 lb 9 6 oz)     Orthostatic Blood Pressures      Most Recent Value   Blood Pressure  106/57 filed at 11/29/2018 1849   Patient Position - Orthostatic VS  Sitting filed at 11/29/2018 1849            Intake/Output Summary (Last 24 hours) at 11/29/18 2010  Last data filed at 11/29/18 1836   Gross per 24 hour   Intake           400 01 ml   Output             3070 ml   Net         -2669 99 ml       Invasive Devices     Peripheral Intravenous Line            Peripheral IV 11/29/18 Right Antecubital less than 1 day          Drain            Urethral Catheter 16 Fr  1 day                Review of Systems: Cardiovascular ROS: no chest pain or dyspnea on exertion    Physical Exam:   Cardiovascular: Normal rate, regular rhythm and normal heart sounds     No murmur heard    Pulmonary/Chest: Breath sounds normal      Lab Results:   CBC with diff:   Results from last 7 days  Lab Units 11/29/18  0539   WBC Thousand/uL 9 84   RBC Million/uL 4 30   HEMOGLOBIN g/dL 12 9   HEMATOCRIT % 37 6   MCV fL 87   MCH pg 30 0   MCHC g/dL 34 3   RDW % 13 5   MPV fL 9 8   PLATELETS Thousands/uL 392*     CMP:   Results from last 7 days  Lab Units 11/29/18  0539   SODIUM mmol/L 139   POTASSIUM mmol/L 3  5   CHLORIDE mmol/L 104   CO2 mmol/L 23   BUN mg/dL 13   CREATININE mg/dL 0 84   CALCIUM mg/dL 8 7   AST U/L 43   ALT U/L 65   ALK PHOS U/L 78   EGFR ml/min/1 73sq m 98     Troponin:   0  Lab Value Date/Time   TROPONINI 27 92 (H) 11/23/2018 0955   TROPONINI 34 56 (H) 11/23/2018 0449   TROPONINI 34 45 (H) 11/23/2018 0018   TROPONINI 33 74 (H) 11/22/2018 2019   TROPONINI 10 63 (H) 11/22/2018 1609   TROPONINI 5 20 (H) 11/22/2018 1514   TROPONINI 1 11 (H) 11/22/2018 1240     BNP:   Results from last 7 days  Lab Units 11/29/18  0539   POTASSIUM mmol/L 3 5   CHLORIDE mmol/L 104   CO2 mmol/L 23   BUN mg/dL 13   CREATININE mg/dL 0 84   CALCIUM mg/dL 8 7   EGFR ml/min/1 73sq m 98     Coags:   Results from last 7 days  Lab Units 11/28/18  0429   INR  1 23*     TSH:     Magnesium:   Results from last 7 days  Lab Units 11/29/18  0539   MAGNESIUM mg/dL 1 9     Lipid Profile:     Imaging: I have personally reviewed pertinent reports  EKG:   VTE Pharmacologic Prophylaxis:   VTE Mechanical Prophylaxis:     Counseling / Coordination of Care  Total time spent today 30 minutes  Greater than 50% of total time was spent with the patient and / or family counseling and / or coordination of care   A description of the counseling / coordination of care: 30

## 2018-11-30 NOTE — RESPIRATORY THERAPY NOTE
Woke patient for treatment  Patient swatted hand at me, and said why are you bothering me  I'll do the next one

## 2018-11-30 NOTE — PLAN OF CARE
Problem: DISCHARGE PLANNING - CARE MANAGEMENT  Goal: Discharge to post-acute care or home with appropriate resources  INTERVENTIONS:  - Conduct assessment to determine patient/family and health care team treatment goals, and need for post-acute services based on payer coverage, community resources, and patient preferences, and barriers to discharge  - Address psychosocial, clinical, and financial barriers to discharge as identified in assessment in conjunction with the patient/family and health care team  - Arrange appropriate level of post-acute services according to patients   needs and preference and payer coverage in collaboration with the physician and health care team  - Communicate with and update the patient/family, physician, and health care team regarding progress on the discharge plan  - Arrange appropriate transportation to post-acute venues   Outcome: Progressing  CM spoke to [address] from  for assistance with this pt  She will review insurance info and will advise accordingly

## 2018-11-30 NOTE — PROGRESS NOTES
Progress Note - Cardiology   Billy Zhang 66 y o  male MRN: [id number]  Unit/Bed#:  Encounter: [id number]    Assessment:  1  Post SCA encephalopathy  2  S/P Vfib cardiac arrest  3  S/P STEMI  4  S/P CRUZ to LAD    Plan:  1  Will notify Dr Neela Coreas  Plan possible ICD implant for Monday, followed by discharge to rehab facility in [facility]  Subjective/Objective   Chief Complaint: None    Subjective: No chest pain    Objective: More disoriented today  Vitals: BP 92/51 (BP Location: Right arm)   Pulse 59   Temp (!) 97 4 °F (36 3 °C) (Axillary)   Resp 15   Ht 6' (1 829 m)   Wt 92 6 kg (204 lb 2 3 oz)   SpO2 94%   BMI 27 69 kg/m²   Vitals:    11/25/18 0600 11/30/18 0600   Weight: 91 9 kg (202 lb 9 6 oz) 92 6 kg (204 lb 2 3 oz)     Orthostatic Blood Pressures      Most Recent Value   Blood Pressure  92/51 filed at 11/30/2018 0900   Patient Position - Orthostatic VS  Lying filed at 11/30/2018 0900            Intake/Output Summary (Last 24 hours) at 11/30/18 1228  Last data filed at 11/30/18 5033   Gross per 24 hour   Intake           111 59 ml   Output              600 ml   Net          -488  41 ml       Invasive Devices     Peripheral Intravenous Line            Peripheral IV 11/29/18 Right Antecubital less than 1 day          Drain            Urethral Catheter 16 Fr  2 days                Review of Systems: Cardiovascular ROS: no chest pain or dyspnea on exertion    Physical Exam:   Cardiovascular: Normal rate, regular rhythm and normal heart sounds     No murmur heard    Pulmonary/Chest: Breath sounds normal      Lab Results:   CBC with diff:   Results from last 7 days  Lab Units 11/30/18  0431   WBC Thousand/uL 10 69*   RBC Million/uL 3 95   HEMOGLOBIN g/dL 11 8*   HEMATOCRIT % 34 8*   MCV fL 88   MCH pg 29 9   MCHC g/dL 33 9   RDW % 13 4   MPV fL 9 5   PLATELETS Thousands/uL 349     CMP:   Results from last 7 days  Lab Units 11/30/18  0431 11/29/18  0539   SODIUM mmol/L 139 139   POTASSIUM mmol/L 3 9 3 5   CHLORIDE mmol/L 104 104   CO2 mmol/L 24 23   BUN mg/dL 22 13   CREATININE mg/dL 1 07 0 84   CALCIUM mg/dL 8 6 8 7   AST U/L  --  43   ALT U/L  --  65   ALK PHOS U/L  --  78   EGFR ml/min/1 73sq m 77 98     Troponin:   0  Lab Value Date/Time   TROPONINI 27 92 (H) 11/23/2018 0955   TROPONINI 34 56 (H) 11/23/2018 0449   TROPONINI 34 45 (H) 11/23/2018 0018   TROPONINI 33 74 (H) 11/22/2018 2019   TROPONINI 10 63 (H) 11/22/2018 1609   TROPONINI 5 20 (H) 11/22/2018 1514   TROPONINI 1 11 (H) 11/22/2018 1240     BNP:   Results from last 7 days  Lab Units 11/30/18  0431   POTASSIUM mmol/L 3 9   CHLORIDE mmol/L 104   CO2 mmol/L 24   BUN mg/dL 22   CREATININE mg/dL 1 07   CALCIUM mg/dL 8 6   EGFR ml/min/1 73sq m 77     Coags:   Results from last 7 days  Lab Units 11/28/18  0429   INR  1 23*     TSH:     Magnesium:   Results from last 7 days  Lab Units 11/30/18  0431   MAGNESIUM mg/dL 1 9     Lipid Profile:     Imaging: I have personally reviewed pertinent reports  EKG:   VTE Pharmacologic Prophylaxis:   VTE Mechanical Prophylaxis:     Counseling / Coordination of Care  Total time spent today 30 minutes  Greater than 50% of total time was spent with the patient and / or family counseling and / or coordination of care   A description of the counseling / coordination of care: 30

## 2018-12-01 LAB
GLUCOSE SERPL-MCNC: 156 MG/DL (ref 65–140)
GLUCOSE SERPL-MCNC: 170 MG/DL (ref 65–140)
GLUCOSE SERPL-MCNC: 201 MG/DL (ref 65–140)
GLUCOSE SERPL-MCNC: 204 MG/DL (ref 65–140)
GLUCOSE SERPL-MCNC: 205 MG/DL (ref 65–140)

## 2018-12-01 PROCEDURE — 94760 N-INVAS EAR/PLS OXIMETRY 1: CPT

## 2018-12-01 PROCEDURE — 94640 AIRWAY INHALATION TREATMENT: CPT

## 2018-12-01 PROCEDURE — 99232 SBSQ HOSP IP/OBS MODERATE 35: CPT | Performed by: STUDENT IN AN ORGANIZED HEALTH CARE EDUCATION/TRAINING PROGRAM

## 2018-12-01 PROCEDURE — 82948 REAGENT STRIP/BLOOD GLUCOSE: CPT

## 2018-12-01 PROCEDURE — 94669 MECHANICAL CHEST WALL OSCILL: CPT

## 2018-12-01 RX ADMIN — LEVALBUTEROL HYDROCHLORIDE 1.25 MG: 1.25 SOLUTION, CONCENTRATE RESPIRATORY (INHALATION) at 13:52

## 2018-12-01 RX ADMIN — MELATONIN 6 MG: 3 TAB ORAL at 21:38

## 2018-12-01 RX ADMIN — ISODIUM CHLORIDE 3 ML: 0.03 SOLUTION RESPIRATORY (INHALATION) at 13:52

## 2018-12-01 RX ADMIN — ATORVASTATIN CALCIUM 40 MG: 40 TABLET, FILM COATED ORAL at 16:41

## 2018-12-01 RX ADMIN — TICAGRELOR 90 MG: 90 TABLET ORAL at 21:39

## 2018-12-01 RX ADMIN — INSULIN LISPRO 1 UNITS: 100 INJECTION, SOLUTION INTRAVENOUS; SUBCUTANEOUS at 16:41

## 2018-12-01 RX ADMIN — TAMSULOSIN HYDROCHLORIDE 0.4 MG: 0.4 CAPSULE ORAL at 16:41

## 2018-12-01 RX ADMIN — HEPARIN SODIUM 5000 UNITS: 5000 INJECTION, SOLUTION INTRAVENOUS; SUBCUTANEOUS at 21:39

## 2018-12-01 RX ADMIN — OLANZAPINE 10 MG: 10 TABLET, ORALLY DISINTEGRATING ORAL at 21:38

## 2018-12-01 RX ADMIN — HEPARIN SODIUM 5000 UNITS: 5000 INJECTION, SOLUTION INTRAVENOUS; SUBCUTANEOUS at 13:16

## 2018-12-01 RX ADMIN — BUSPIRONE HYDROCHLORIDE 15 MG: 5 TABLET ORAL at 16:42

## 2018-12-01 RX ADMIN — ACETAMINOPHEN 975 MG: 325 TABLET, FILM COATED ORAL at 08:39

## 2018-12-01 RX ADMIN — LEVALBUTEROL HYDROCHLORIDE 1.25 MG: 1.25 SOLUTION, CONCENTRATE RESPIRATORY (INHALATION) at 08:48

## 2018-12-01 RX ADMIN — HEPARIN SODIUM 5000 UNITS: 5000 INJECTION, SOLUTION INTRAVENOUS; SUBCUTANEOUS at 07:05

## 2018-12-01 RX ADMIN — ISODIUM CHLORIDE 3 ML: 0.03 SOLUTION RESPIRATORY (INHALATION) at 19:06

## 2018-12-01 RX ADMIN — FUROSEMIDE 20 MG: 20 TABLET ORAL at 08:44

## 2018-12-01 RX ADMIN — LIDOCAINE 2 PATCH: 50 PATCH CUTANEOUS at 08:46

## 2018-12-01 RX ADMIN — INSULIN LISPRO 2 UNITS: 100 INJECTION, SOLUTION INTRAVENOUS; SUBCUTANEOUS at 13:14

## 2018-12-01 RX ADMIN — INSULIN LISPRO 1 UNITS: 100 INJECTION, SOLUTION INTRAVENOUS; SUBCUTANEOUS at 07:05

## 2018-12-01 RX ADMIN — LISINOPRIL 10 MG: 10 TABLET ORAL at 08:41

## 2018-12-01 RX ADMIN — ISODIUM CHLORIDE 3 ML: 0.03 SOLUTION RESPIRATORY (INHALATION) at 08:48

## 2018-12-01 RX ADMIN — INSULIN LISPRO 2 UNITS: 100 INJECTION, SOLUTION INTRAVENOUS; SUBCUTANEOUS at 21:39

## 2018-12-01 RX ADMIN — LEVALBUTEROL HYDROCHLORIDE 1.25 MG: 1.25 SOLUTION, CONCENTRATE RESPIRATORY (INHALATION) at 19:06

## 2018-12-01 RX ADMIN — TICAGRELOR 90 MG: 90 TABLET ORAL at 08:43

## 2018-12-01 RX ADMIN — INSULIN LISPRO 1 UNITS: 100 INJECTION, SOLUTION INTRAVENOUS; SUBCUTANEOUS at 02:06

## 2018-12-01 RX ADMIN — ASPIRIN 81 MG 81 MG: 81 TABLET ORAL at 08:41

## 2018-12-01 RX ADMIN — METOPROLOL TARTRATE 25 MG: 25 TABLET, FILM COATED ORAL at 21:38

## 2018-12-01 RX ADMIN — METRONIDAZOLE 500 MG: 500 INJECTION, SOLUTION INTRAVENOUS at 03:00

## 2018-12-01 RX ADMIN — BUSPIRONE HYDROCHLORIDE 15 MG: 5 TABLET ORAL at 09:42

## 2018-12-01 RX ADMIN — METOPROLOL TARTRATE 25 MG: 25 TABLET, FILM COATED ORAL at 08:42

## 2018-12-01 RX ADMIN — NICOTINE 14 MG: 14 PATCH TRANSDERMAL at 08:45

## 2018-12-01 NOTE — PROGRESS NOTES
Progress Note - ICU Transfer to [facility]   Summer Zhang 66 y o  male MRN: [id number]  [address] [facility]   Unit/Bed#:  Encounter: [id number]    Code Status: Level 1 - Full Code  POA:    POLST:      Reason for ICU admission:  Cardiac arrest    Active problems:   Principal Problem:    Ventricular fibrillation ([facility] )  Active Problems:    Cardiac arrest ([facility] )    STEMI (ST elevation myocardial infarction) ([place] [address] )    CAD (coronary artery disease)    Toxic metabolic encephalopathy    Anxiety  Resolved Problems:    STEVIE (acute kidney injury) ([facility] 75 )    Transaminitis    Bandemia      Consultants:  Cardiology, Urology    History of Present Illness:  Patient is a 81-year-old male presenting on 11/22 following a witnessed cardiac arrest rest   He has no past medical history  In the outpatient setting the patient received 2 defibrillations on initial arrival of emergency personnel, and route to the hospital the patient received an additional 6 shocks with 6 doses of epinephrine, 300 mg a aorta room bolus, and an amiodarone infusion  On arrival to the emergency room the patient was moving all extremities and was intubated in the emergency room  Immediately following intubation he proceeded to the cardiac catheterization suite where he under went deployment of a drug-eluting stent to the LAD  Following this procedure he was admitted to the critical care unit for closer monitoring  Summary of clinical course:  He was successfully liberated from mechanical ventilation following day and all vasopressor support  Echocardiogram done in the unit demonstrated wall motion abnormalities with depressed ejection fraction  Following liberation patient developed significant agitation felt to be secondary to hypoxic encephalopathy  He was placed on Precedex and initially started on continuous observation    He was seen by the Cardiology service throughout his hospitalization felt he would likely need an ICD secondary to his profound cardiomyopathy  He continued to have periods of impulsiveness characterized by aggression toward staff members requiring liberal use of antipsychotics as well as Precedex  He has since been off Precedex since yesterday morning and is more alert/redirectable today  He has experienced significant urinary retention which ultimately resulted in replacement of the Ramey catheter on 11/29  He was seen by the Urology service who recommended the Ramey catheter remain in place for 4-5 days at which point he could again undergo a spontaneous voiding trial and he was started on Flomax  At this point he is felt to be stable for transition to a medical-surgical floor with telemetry in anticipation of obtaining an ICD while inpatient        Recent or scheduled procedures:   11/29 CXR- left basal opacity compatible with small effusion and component of atelectasis, stable mild cardiomegaly  11/28 Urinary catheter  11/23 CT Head- no acute intracranial abnormality  11/23 ECHO- left ventricle dilated with reduced systolic function with an ejection fraction of 20-25% and severe hypokinesis of the apical anterior/mid anteroseptal, and anterior inferior walls with akinesis of the mid inferoseptal and apical walls, mild mitral regurgitation, mild aortic regurgitation, mild trace tricuspid regurgitation, trace pulmonic regurgitation  11/22 Cardiac cath- balloon angioplasty on the 100% lesion in the proximal LAD, drug-eluting stent placed on 100% lesion in the mid LAD    Outstanding/pending diagnostics:   Voiding trial, ICD    Cultures:   11/22 Blood x2- no growth after 5 days       Mobilization Plan:  Out of bed with assistance    Nutrition Plan:  Oral diet    Discharge Plan:   Patient should be ready for discharge to short-term rehab after placement of ICD, voiding trial    Initial Physical Therapy Recommendations:  Short-term rehab  Initial Occupational Therapy Recommendations:  Short-term rehab  Initial /Plan:  [address] [place] [address] would any stent    Home medications that are not reordered and reason why:   None     Specific Diagnosis Plan:    Heart Failure:  Cardiology consult placed  Diuresis plan:  Oral Lasix    Spoke with Dr Koko White regarding transfer  Please call [phone] with any questions or concerns  Portions of the record may have been created with voice recognition software  Occasional wrong word or "sound a like" substitutions may have occurred due to the inherent limitations of voice recognition software  Read the chart carefully and recognize, using context, where substitutions have occurred      LALITHA Jain

## 2018-12-01 NOTE — PROGRESS NOTES
Progress Note - Critical Care   Billy Zhang 66 y o  male MRN: [id number]  Unit/Bed#:  Encounter: [id number]    Attending Physician: Yani Horner MD      ______________________________________________________________________  Assessment and Plan:   Principal Problem:    Ventricular fibrillation [facility])  Active Problems:    Cardiac arrest ([facility] )    STEMI (ST elevation myocardial infarction) ([place] [address] )    CAD (coronary artery disease)    Toxic metabolic encephalopathy    Anxiety  Resolved Problems:    STEVIE (acute kidney injury) ([place] [address] )    Transaminitis    Bandemia    Neuro: Remains impulsive, having periods of a spontaneous impulsiveness, cannot exclude hypoxic injury  Continue monitor neurologic status, encourage proper sleep hygiene,  monitor CAM-ICU     CV: S/P STEMI/ventricular fibrillation arrest status post PCI with CRUZ to the LAD  Continue monitor hemodynamics, continue anticoagulants/antiplatelets  May be transferred to [place] on Monday for ICD implant     Pulm:  Encourage use of incentive spirometry and deep breathing, patient out of bed as tolerated, provide supplemental oxygen to maintain saturation greater than 95%, bronchodilators as ordered     GI:  Continue monitor transaminase     : Monitor I&O closely, monitor and trend renal indices     F/E/N:  Monitor replete electrolytes as indicated, maintain potassium greater than 4 0 and magnesium greater than 2 0     ID:  No indication for antibiotics     Heme:  Monitor and trend hemoglobin/hematocrit, platelets, continue anticoagulation as ordered     Endo:  Monitor blood glucose and treat as indicated                Msk/Skin:  Encourage patient out of bed     Disposition:  Remains ICU        Code Status: Level 1 - Full Code    Counseling / Coordination of Care  Total Critical Care time spent 30 minutes excluding procedures, teaching and family updates      ______________________________________________________________________    Chief Complaint: none offered    24 Hour Events:  Much less impulsive today      ______________________________________________________________________    Physical Exam:   Constitutional:  Physical status improvement  HEENT:  Normocephalic, atraumatic, pupils equal reactive, EOM intact, sclera anicteric, airway patent, trachea midline without tugging, negative JVD, neck supple  Pulm:  Lungs equal with scattered rhonchi  CV: HRRR  ABD:  Soft/nontender  M/S:  Moves all extremities well, good distal CC SM  Skin:  Warm/dry/intact  Neuro:  No focal deficits  Mental Status:   somnolent    ______________________________________________________________________  Vitals:    18 2000 18 2100 18 2200 18 2340   BP: 120/70 120/69 128/76 114/69   BP Location:    Right arm   Pulse: 73 78 80 76   Resp: (!) 26 (!) 11 (!) 26 (!) 28   Temp:    99 1 °F (37 3 °C)   TempSrc:    Oral   SpO2: 94% 92% 92% 90%   Weight:       Height:         Arterial Line BP: 78/71  Arterial Line MAP (mmHg): 74 mmHg     Temperature:   Temp (24hrs), Av 3 °F (36 8 °C), Min:97 4 °F (36 3 °C), Max:99 1 °F (37 3 °C)    Current Temperature: 99 1 °F (37 3 °C)  Weights:   IBW: 77 6 kg    Body mass index is 27 69 kg/m²    Weight (last 2 days)     Date/Time   Weight    18 0600  92 6 (204 15)            Hemodynamic Monitoring:  N/A     Non-Invasive/Invasive Ventilation Settings:  Respiratory    Lab Data (Last 4 hours)    None         O2/Vent Data (Last 4 hours)    None              No results found for: PHART, QIN3IXJ, PO2ART, MET5NNW, J0JCLOBE, BEART, SOURCE  SpO2: SpO2: 90 %  Intake and Outputs:  I/O        07 -  0700  07 -  0700    I V  (mL/kg) 141 6 (1 5)     IV Piggyback 100     Total Intake(mL/kg) 241 6 (2 6)     Urine (mL/kg/hr) 1035 (0 5)     Total Output 1035      Net -793 4                UOP: qs/hour   Nutrition:        Diet Orders            Start     Ordered    18 1518  Diet Cardiovascular; Cardiac Diet effective now     Comments:  No barley or barley products   Question Answer Comment   Diet Type Cardiovascular    Cardiac Cardiac    RD to adjust diet per protocol? Yes        11/29/18 1517    11/27/18 1154  Dietary nutrition supplements  Once     Question Answer Comment   Select Supplement: Ensure Enlive-Vanilla    Frequency Breakfast, Dinner        11/27/18 1153          Labs:     Results from last 7 days  Lab Units 11/30/18  0431 11/29/18  0539 11/28/18  0429   WBC Thousand/uL 10 69* 9 84 11 71*   HEMOGLOBIN g/dL 11 8* 12 9 12 7   HEMATOCRIT % 34 8* 37 6 37 4   PLATELETS Thousands/uL 349 392* 319   NEUTROS PCT % 72 73 78*   MONOS PCT % 11 11 10       Results from last 7 days  Lab Units 11/30/18  0431 11/29/18  0539 11/28/18  0429 11/27/18  0617   POTASSIUM mmol/L 3 9 3 5 3 3* 3 6   CHLORIDE mmol/L 104 104 105 106   CO2 mmol/L 24 23 25 21   BUN mg/dL 22 13 11 9   CREATININE mg/dL 1 07 0 84 0 84 0 67   CALCIUM mg/dL 8 6 8 7 8 6 7 0*   ALK PHOS U/L  --  78 69 56   ALT U/L  --  65 62 58   AST U/L  --  43 32 33       Results from last 7 days  Lab Units 11/30/18  0431 11/29/18  0539 11/28/18  0429   MAGNESIUM mg/dL 1 9 1 9 2 0     Lab Results   Component Value Date    PHOS 3 0 11/29/2018    PHOS 2 8 11/28/2018    PHOS 2 3 (L) 11/27/2018        Results from last 7 days  Lab Units 11/28/18  0429   INR  1 23*       0  Lab Value Date/Time   TROPONINI 27 92 (H) 11/23/2018 0955   TROPONINI 34 56 (H) 11/23/2018 0449   TROPONINI 34 45 (H) 11/23/2018 0018   TROPONINI 33 74 (H) 11/22/2018 2019   TROPONINI 10 63 (H) 11/22/2018 1609   TROPONINI 5 20 (H) 11/22/2018 1514   TROPONINI 1 11 (H) 11/22/2018 1240         ABG:  Lab Results   Component Value Date    PHART 7 473 (H) 11/28/2018    LTS2MVR 32 1 (L) 11/28/2018    PO2ART 62 4 (L) 11/28/2018    NMI7DLK 23 0 11/28/2018    BEART 0 1 11/28/2018    SOURCE Artery 11/28/2018     Imaging:  I have personally reviewed pertinent reports      EKG: SR  Micro:  Lab Results   Component Value Date    BLOODCX No Growth After 5 Days  11/22/2018    BLOODCX No Growth After 5 Days  11/22/2018     Allergies: Allergies   Allergen Reactions    Barley Grass Throat Swelling     Medications:   Scheduled Meds:  Current Facility-Administered Medications:  acetaminophen 975 mg Oral Q6H PRN Dimple Boykin, PA-EL    aspirin 81 mg Oral Daily Dimple Boykin, CATARINA    atorvastatin 40 mg Oral Daily With Gunner Chris PA-C    bacitracin 1 large application Topical BID Dimple Boykin, CATARINA    busPIRone 15 mg Oral BID Justino CATARINA Alvarenga    furosemide 20 mg Oral Daily LALITHA De La Cruz    heparin (porcine) 5,000 Units Subcutaneous Counts include [facility] beds at the [facility] Justino Alvarenga PA-C    insulin lispro 1-6 Units Subcutaneous Q6H [address] Dimple Boykin, CATARINA    levalbuterol 1 25 mg Nebulization TID Landon Damon MD    lidocaine 2 patch Topical Daily Justino Alvarenga PA-C    lisinopril 10 mg Oral Daily Dimple Boykin, [place]    melatonin 6 mg Oral HS Paige [place]    metoprolol tartrate 25 mg Oral Q12H [address] Dimple Boykin, CATARINA    metroNIDAZOLE 500 mg Intravenous Q8H LALITHA De La Cruz Last Rate: 500 mg (11/30/18 1830)   nicotine 14 mg Transdermal Daily Emil Chris PA-C    OLANZapine 10 mg Oral HS Ale CoolsLALITHA    oxyCODONE 2 5 mg Oral Q4H PRN LALITHA De La Cruz    sodium chloride 3 mL Nebulization TID Landon Damon MD    tamsulosin 0 4 mg Oral Daily With Dinner LALITHA De La Cruz    ticagrelor 90 mg Oral Q12H [address] Quinten Schultz MD      Continuous Infusions:   PRN Meds:    acetaminophen 975 mg Q6H PRN   oxyCODONE 2 5 mg Q4H PRN     VTE Pharmacologic Prophylaxis: Heparin  VTE Mechanical Prophylaxis: sequential compression device  Invasive lines and devices: Invasive Devices     Peripheral Intravenous Line            Peripheral IV 11/29/18 Right Antecubital 1 day          Drain            Urethral Catheter 16 Fr  2 days                     Portions of the record may have been created with voice recognition software    Occasional wrong word or "sound a like" substitutions may have occurred due to the inherent limitations of voice recognition software  Read the chart carefully and recognize, using context, where substitutions have occurred      Pili Zamora PA-C

## 2018-12-02 ENCOUNTER — HOSPITAL ENCOUNTER (INPATIENT)
Facility: HOSPITAL | Age: 58
LOS: 2 days | Discharge: HOME HEALTH | DRG: 226 | End: 2018-12-04
Attending: INTERNAL MEDICINE | Admitting: INTERNAL MEDICINE
Payer: COMMERCIAL

## 2018-12-02 VITALS
RESPIRATION RATE: 20 BRPM | TEMPERATURE: 98.5 F | HEART RATE: 82 BPM | OXYGEN SATURATION: 95 % | HEIGHT: 72 IN | DIASTOLIC BLOOD PRESSURE: 66 MMHG | BODY MASS INDEX: 28.01 KG/M2 | WEIGHT: 206.79 LBS | SYSTOLIC BLOOD PRESSURE: 120 MMHG

## 2018-12-02 DIAGNOSIS — E11.9 TYPE 2 DIABETES MELLITUS WITHOUT COMPLICATION, WITHOUT LONG-TERM CURRENT USE OF INSULIN (HCC): ICD-10-CM

## 2018-12-02 DIAGNOSIS — F41.9 ANXIETY: ICD-10-CM

## 2018-12-02 DIAGNOSIS — I21.02 ST ELEVATION MYOCARDIAL INFARCTION INVOLVING LEFT ANTERIOR DESCENDING (LAD) CORONARY ARTERY (HCC): ICD-10-CM

## 2018-12-02 DIAGNOSIS — R33.9 URINARY RETENTION: ICD-10-CM

## 2018-12-02 DIAGNOSIS — I46.9 CARDIAC ARREST (HCC): ICD-10-CM

## 2018-12-02 DIAGNOSIS — I49.01 VENTRICULAR FIBRILLATION (HCC): Primary | ICD-10-CM

## 2018-12-02 DIAGNOSIS — I25.10 CORONARY ARTERY DISEASE INVOLVING NATIVE CORONARY ARTERY OF NATIVE HEART, ANGINA PRESENCE UNSPECIFIED: ICD-10-CM

## 2018-12-02 DIAGNOSIS — Z72.0 TOBACCO ABUSE: ICD-10-CM

## 2018-12-02 PROBLEM — G92.8 TOXIC METABOLIC ENCEPHALOPATHY: Status: RESOLVED | Noted: 2018-11-24 | Resolved: 2018-12-02

## 2018-12-02 LAB
ANION GAP SERPL CALCULATED.3IONS-SCNC: 11 MMOL/L (ref 4–13)
BUN SERPL-MCNC: 13 MG/DL (ref 5–25)
CALCIUM SERPL-MCNC: 8.8 MG/DL (ref 8.3–10.1)
CHLORIDE SERPL-SCNC: 103 MMOL/L (ref 100–108)
CO2 SERPL-SCNC: 23 MMOL/L (ref 21–32)
CREAT SERPL-MCNC: 0.91 MG/DL (ref 0.6–1.3)
ERYTHROCYTE [DISTWIDTH] IN BLOOD BY AUTOMATED COUNT: 13.8 % (ref 11.6–15.1)
GFR SERPL CREATININE-BSD FRML MDRD: 94 ML/MIN/1.73SQ M
GLUCOSE SERPL-MCNC: 158 MG/DL (ref 65–140)
GLUCOSE SERPL-MCNC: 165 MG/DL (ref 65–140)
GLUCOSE SERPL-MCNC: 204 MG/DL (ref 65–140)
GLUCOSE SERPL-MCNC: 216 MG/DL (ref 65–140)
GLUCOSE SERPL-MCNC: 218 MG/DL (ref 65–140)
HCT VFR BLD AUTO: 37.4 % (ref 36.5–49.3)
HGB BLD-MCNC: 12.5 G/DL (ref 12–17)
MAGNESIUM SERPL-MCNC: 2.1 MG/DL (ref 1.6–2.6)
MCH RBC QN AUTO: 30 PG (ref 26.8–34.3)
MCHC RBC AUTO-ENTMCNC: 33.4 G/DL (ref 31.4–37.4)
MCV RBC AUTO: 90 FL (ref 82–98)
PLATELET # BLD AUTO: 353 THOUSANDS/UL (ref 149–390)
PMV BLD AUTO: 9.6 FL (ref 8.9–12.7)
POTASSIUM SERPL-SCNC: 4 MMOL/L (ref 3.5–5.3)
RBC # BLD AUTO: 4.17 MILLION/UL (ref 3.88–5.62)
SODIUM SERPL-SCNC: 137 MMOL/L (ref 136–145)
WBC # BLD AUTO: 12.7 THOUSAND/UL (ref 4.31–10.16)

## 2018-12-02 PROCEDURE — 99239 HOSP IP/OBS DSCHRG MGMT >30: CPT | Performed by: INTERNAL MEDICINE

## 2018-12-02 PROCEDURE — 94640 AIRWAY INHALATION TREATMENT: CPT

## 2018-12-02 PROCEDURE — 80048 BASIC METABOLIC PNL TOTAL CA: CPT | Performed by: NURSE PRACTITIONER

## 2018-12-02 PROCEDURE — 85027 COMPLETE CBC AUTOMATED: CPT | Performed by: NURSE PRACTITIONER

## 2018-12-02 PROCEDURE — 94760 N-INVAS EAR/PLS OXIMETRY 1: CPT

## 2018-12-02 PROCEDURE — 83735 ASSAY OF MAGNESIUM: CPT | Performed by: NURSE PRACTITIONER

## 2018-12-02 PROCEDURE — 82948 REAGENT STRIP/BLOOD GLUCOSE: CPT

## 2018-12-02 RX ORDER — OXYCODONE HYDROCHLORIDE 5 MG/1
2.5 TABLET ORAL EVERY 4 HOURS PRN
Status: CANCELLED | OUTPATIENT
Start: 2018-12-02

## 2018-12-02 RX ORDER — GINSENG 100 MG
1 CAPSULE ORAL 2 TIMES DAILY
Status: DISCONTINUED | OUTPATIENT
Start: 2018-12-02 | End: 2018-12-02

## 2018-12-02 RX ORDER — LISINOPRIL 10 MG/1
10 TABLET ORAL DAILY
Status: DISCONTINUED | OUTPATIENT
Start: 2018-12-03 | End: 2018-12-04 | Stop reason: HOSPADM

## 2018-12-02 RX ORDER — ATORVASTATIN CALCIUM 40 MG/1
40 TABLET, FILM COATED ORAL
Status: CANCELLED | OUTPATIENT
Start: 2018-12-02

## 2018-12-02 RX ORDER — ASPIRIN 81 MG/1
81 TABLET, CHEWABLE ORAL DAILY
Status: CANCELLED | OUTPATIENT
Start: 2018-12-03

## 2018-12-02 RX ORDER — NICOTINE 21 MG/24HR
14 PATCH, TRANSDERMAL 24 HOURS TRANSDERMAL DAILY
Status: CANCELLED | OUTPATIENT
Start: 2018-12-03

## 2018-12-02 RX ORDER — LANOLIN ALCOHOL/MO/W.PET/CERES
6 CREAM (GRAM) TOPICAL
Status: CANCELLED | OUTPATIENT
Start: 2018-12-02

## 2018-12-02 RX ORDER — OLANZAPINE 10 MG/1
10 TABLET, ORALLY DISINTEGRATING ORAL
Status: DISCONTINUED | OUTPATIENT
Start: 2018-12-02 | End: 2018-12-02

## 2018-12-02 RX ORDER — ACETAMINOPHEN 325 MG/1
975 TABLET ORAL EVERY 6 HOURS PRN
Status: CANCELLED | OUTPATIENT
Start: 2018-12-02

## 2018-12-02 RX ORDER — NICOTINE 21 MG/24HR
14 PATCH, TRANSDERMAL 24 HOURS TRANSDERMAL DAILY
Status: DISCONTINUED | OUTPATIENT
Start: 2018-12-03 | End: 2018-12-04 | Stop reason: HOSPADM

## 2018-12-02 RX ORDER — LISINOPRIL 10 MG/1
10 TABLET ORAL DAILY
Status: CANCELLED | OUTPATIENT
Start: 2018-12-03

## 2018-12-02 RX ORDER — HEPARIN SODIUM 5000 [USP'U]/ML
5000 INJECTION, SOLUTION INTRAVENOUS; SUBCUTANEOUS EVERY 8 HOURS SCHEDULED
Status: CANCELLED | OUTPATIENT
Start: 2018-12-02

## 2018-12-02 RX ORDER — SODIUM CHLORIDE FOR INHALATION 0.9 %
3 VIAL, NEBULIZER (ML) INHALATION
Status: DISCONTINUED | OUTPATIENT
Start: 2018-12-02 | End: 2018-12-02

## 2018-12-02 RX ORDER — LIDOCAINE 50 MG/G
2 PATCH TOPICAL DAILY
Status: CANCELLED | OUTPATIENT
Start: 2018-12-03

## 2018-12-02 RX ORDER — ATORVASTATIN CALCIUM 40 MG/1
40 TABLET, FILM COATED ORAL
Status: DISCONTINUED | OUTPATIENT
Start: 2018-12-02 | End: 2018-12-04 | Stop reason: HOSPADM

## 2018-12-02 RX ORDER — OXYCODONE HYDROCHLORIDE 5 MG/1
2.5 TABLET ORAL EVERY 4 HOURS PRN
Status: DISCONTINUED | OUTPATIENT
Start: 2018-12-02 | End: 2018-12-04 | Stop reason: HOSPADM

## 2018-12-02 RX ORDER — FUROSEMIDE 20 MG/1
20 TABLET ORAL DAILY
Status: DISCONTINUED | OUTPATIENT
Start: 2018-12-03 | End: 2018-12-04 | Stop reason: HOSPADM

## 2018-12-02 RX ORDER — FUROSEMIDE 20 MG/1
20 TABLET ORAL DAILY
Status: CANCELLED | OUTPATIENT
Start: 2018-12-03

## 2018-12-02 RX ORDER — HEPARIN SODIUM 5000 [USP'U]/ML
5000 INJECTION, SOLUTION INTRAVENOUS; SUBCUTANEOUS EVERY 8 HOURS SCHEDULED
Status: DISCONTINUED | OUTPATIENT
Start: 2018-12-02 | End: 2018-12-02

## 2018-12-02 RX ORDER — TAMSULOSIN HYDROCHLORIDE 0.4 MG/1
0.4 CAPSULE ORAL
Status: CANCELLED | OUTPATIENT
Start: 2018-12-02

## 2018-12-02 RX ORDER — ACETAMINOPHEN 325 MG/1
975 TABLET ORAL EVERY 6 HOURS PRN
Status: DISCONTINUED | OUTPATIENT
Start: 2018-12-02 | End: 2018-12-04 | Stop reason: HOSPADM

## 2018-12-02 RX ORDER — LIDOCAINE 50 MG/G
2 PATCH TOPICAL DAILY
Status: DISCONTINUED | OUTPATIENT
Start: 2018-12-03 | End: 2018-12-02

## 2018-12-02 RX ORDER — GINSENG 100 MG
1 CAPSULE ORAL 2 TIMES DAILY
Status: CANCELLED | OUTPATIENT
Start: 2018-12-02

## 2018-12-02 RX ORDER — LEVALBUTEROL 1.25 MG/.5ML
1.25 SOLUTION, CONCENTRATE RESPIRATORY (INHALATION)
Status: CANCELLED | OUTPATIENT
Start: 2018-12-02

## 2018-12-02 RX ORDER — SODIUM CHLORIDE FOR INHALATION 0.9 %
3 VIAL, NEBULIZER (ML) INHALATION
Status: CANCELLED | OUTPATIENT
Start: 2018-12-02

## 2018-12-02 RX ORDER — LANOLIN ALCOHOL/MO/W.PET/CERES
6 CREAM (GRAM) TOPICAL
Status: DISCONTINUED | OUTPATIENT
Start: 2018-12-02 | End: 2018-12-04 | Stop reason: HOSPADM

## 2018-12-02 RX ORDER — ASPIRIN 81 MG/1
81 TABLET, CHEWABLE ORAL DAILY
Status: DISCONTINUED | OUTPATIENT
Start: 2018-12-03 | End: 2018-12-04 | Stop reason: HOSPADM

## 2018-12-02 RX ORDER — LEVALBUTEROL 1.25 MG/.5ML
1.25 SOLUTION, CONCENTRATE RESPIRATORY (INHALATION)
Status: DISCONTINUED | OUTPATIENT
Start: 2018-12-02 | End: 2018-12-02

## 2018-12-02 RX ORDER — TAMSULOSIN HYDROCHLORIDE 0.4 MG/1
0.4 CAPSULE ORAL
Status: DISCONTINUED | OUTPATIENT
Start: 2018-12-02 | End: 2018-12-04 | Stop reason: HOSPADM

## 2018-12-02 RX ORDER — OLANZAPINE 10 MG/1
10 TABLET, ORALLY DISINTEGRATING ORAL
Status: CANCELLED | OUTPATIENT
Start: 2018-12-02

## 2018-12-02 RX ADMIN — INSULIN LISPRO 2 UNITS: 100 INJECTION, SOLUTION INTRAVENOUS; SUBCUTANEOUS at 11:57

## 2018-12-02 RX ADMIN — LEVALBUTEROL HYDROCHLORIDE 1.25 MG: 1.25 SOLUTION, CONCENTRATE RESPIRATORY (INHALATION) at 08:03

## 2018-12-02 RX ADMIN — LIDOCAINE 2 PATCH: 50 PATCH CUTANEOUS at 07:46

## 2018-12-02 RX ADMIN — MELATONIN 6 MG: at 21:50

## 2018-12-02 RX ADMIN — METOPROLOL TARTRATE 25 MG: 25 TABLET, FILM COATED ORAL at 07:42

## 2018-12-02 RX ADMIN — LISINOPRIL 10 MG: 10 TABLET ORAL at 07:42

## 2018-12-02 RX ADMIN — INSULIN LISPRO 2 UNITS: 100 INJECTION, SOLUTION INTRAVENOUS; SUBCUTANEOUS at 07:35

## 2018-12-02 RX ADMIN — ISODIUM CHLORIDE 3 ML: 0.03 SOLUTION RESPIRATORY (INHALATION) at 08:03

## 2018-12-02 RX ADMIN — TICAGRELOR 90 MG: 90 TABLET ORAL at 07:41

## 2018-12-02 RX ADMIN — BUSPIRONE HYDROCHLORIDE 15 MG: 5 TABLET ORAL at 07:44

## 2018-12-02 RX ADMIN — TICAGRELOR 90 MG: 90 TABLET ORAL at 21:50

## 2018-12-02 RX ADMIN — Medication 524 MG: at 11:53

## 2018-12-02 RX ADMIN — HEPARIN SODIUM 5000 UNITS: 5000 INJECTION, SOLUTION INTRAVENOUS; SUBCUTANEOUS at 05:35

## 2018-12-02 RX ADMIN — LEVALBUTEROL HYDROCHLORIDE 1.25 MG: 1.25 SOLUTION, CONCENTRATE RESPIRATORY (INHALATION) at 13:16

## 2018-12-02 RX ADMIN — ISODIUM CHLORIDE 3 ML: 0.03 SOLUTION RESPIRATORY (INHALATION) at 13:17

## 2018-12-02 RX ADMIN — TAMSULOSIN HYDROCHLORIDE 0.4 MG: 0.4 CAPSULE ORAL at 17:41

## 2018-12-02 RX ADMIN — METOPROLOL TARTRATE 25 MG: 25 TABLET, FILM COATED ORAL at 21:50

## 2018-12-02 RX ADMIN — ACETAMINOPHEN 975 MG: 325 TABLET, FILM COATED ORAL at 07:39

## 2018-12-02 RX ADMIN — ATORVASTATIN CALCIUM 40 MG: 40 TABLET, FILM COATED ORAL at 17:41

## 2018-12-02 RX ADMIN — HEPARIN SODIUM 5000 UNITS: 5000 INJECTION, SOLUTION INTRAVENOUS; SUBCUTANEOUS at 12:00

## 2018-12-02 RX ADMIN — ASPIRIN 81 MG 81 MG: 81 TABLET ORAL at 07:39

## 2018-12-02 RX ADMIN — BUSPIRONE HYDROCHLORIDE 15 MG: 10 TABLET ORAL at 17:41

## 2018-12-02 RX ADMIN — NICOTINE 14 MG: 14 PATCH TRANSDERMAL at 07:45

## 2018-12-02 RX ADMIN — INSULIN LISPRO 2 UNITS: 100 INJECTION, SOLUTION INTRAVENOUS; SUBCUTANEOUS at 18:05

## 2018-12-02 RX ADMIN — FUROSEMIDE 20 MG: 20 TABLET ORAL at 07:43

## 2018-12-02 NOTE — PROGRESS NOTES
Progress Note - Cardiology   Billy Zhang 66 y o  male MRN: [id number]  Unit/Bed#:  Encounter: [id number]    Assessment:  1  Post SCA encephalopathy  2  S/P Vfib cardiac arrest  3  S/P STEMI  4  S/P CRUZ to LAD    Plan:  1  Sable cardiovascular status  2  For transfer to Dr Neela Coreas for ICD implant  3  Follow up in my office after he returns to [place]  Subjective/Objective   Chief Complaint: None    Subjective: No chest pain    Objective: No distress    Vitals: /66   Pulse 82   Temp 98 5 °F (36 9 °C) (Oral)   Resp 20   Ht 6' (1 829 m)   Wt 93 8 kg (206 lb 12 7 oz)   SpO2 95%   BMI 28 05 kg/m²   Vitals:    11/30/18 0600 12/02/18 0300   Weight: 92 6 kg (204 lb 2 3 oz) 93 8 kg (206 lb 12 7 oz)     Orthostatic Blood Pressures      Most Recent Value   Blood Pressure  120/66 filed at 12/02/2018 3442   Patient Position - Orthostatic VS  Lying filed at 12/02/2018 0700            Intake/Output Summary (Last 24 hours) at 12/02/18 1122  Last data filed at 12/02/18 0902   Gross per 24 hour   Intake             1025 ml   Output             1300 ml   Net             -275 ml       Invasive Devices     Peripheral Intravenous Line            Peripheral IV 11/29/18 Right Antecubital 2 days          Drain            Urethral Catheter 16 Fr  4 days                Review of Systems: Cardiovascular ROS: no chest pain or dyspnea on exertion    Physical Exam:   Cardiovascular: Normal rate, regular rhythm and normal heart sounds     No murmur heard    Pulmonary/Chest: Breath sounds normal         Lab Results:   CBC with diff:   Results from last 7 days  Lab Units 12/02/18  0509   WBC Thousand/uL 12 70*   RBC Million/uL 4 17   HEMOGLOBIN g/dL 12 5   HEMATOCRIT % 37 4   MCV fL 90   MCH pg 30 0   MCHC g/dL 33 4   RDW % 13 8   MPV fL 9 6   PLATELETS Thousands/uL 353     CMP:   Results from last 7 days  Lab Units 12/02/18  0509  11/29/18  0539   SODIUM mmol/L 137  < > 139   POTASSIUM mmol/L 4 0  < > 3 5 CHLORIDE mmol/L 103  < > 104   CO2 mmol/L 23  < > 23   BUN mg/dL 13  < > 13   CREATININE mg/dL 0 91  < > 0 84   CALCIUM mg/dL 8 8  < > 8 7   AST U/L  --   --  43   ALT U/L  --   --  65   ALK PHOS U/L  --   --  78   EGFR ml/min/1 73sq m 94  < > 98   < > = values in this interval not displayed  Troponin:   0  Lab Value Date/Time   TROPONINI 27 92 (H) 11/23/2018 0955   TROPONINI 34 56 (H) 11/23/2018 0449   TROPONINI 34 45 (H) 11/23/2018 0018   TROPONINI 33 74 (H) 11/22/2018 2019   TROPONINI 10 63 (H) 11/22/2018 1609   TROPONINI 5 20 (H) 11/22/2018 1514   TROPONINI 1 11 (H) 11/22/2018 1240     BNP:   Results from last 7 days  Lab Units 12/02/18  0509   POTASSIUM mmol/L 4 0   CHLORIDE mmol/L 103   CO2 mmol/L 23   BUN mg/dL 13   CREATININE mg/dL 0 91   CALCIUM mg/dL 8 8   EGFR ml/min/1 73sq m 94     Coags:   Results from last 7 days  Lab Units 11/28/18  0429   INR  1 23*     TSH:     Magnesium:   Results from last 7 days  Lab Units 12/02/18  0509   MAGNESIUM mg/dL 2 1     Lipid Profile:     Imaging: I have personally reviewed pertinent reports  EKG:   VTE Pharmacologic Prophylaxis:   VTE Mechanical Prophylaxis:     Counseling / Coordination of Care  Total time spent today 30 minutes  Greater than 50% of total time was spent with the patient and / or family counseling and / or coordination of care   A description of the counseling / coordination of care: 30

## 2018-12-02 NOTE — H&P
INTERNAL MEDICINE HISTORY AND PHYSICAL  [facility] 511-01 SOD Team A    NAME: Billy Zhang  AGE: 66 y o  SEX: male  : 1960   MRN: [id number]  ENCOUNTER: [id number]    DATE: 2018  TIME: 4:52 PM    Primary Care Physician: No primary care provider on file  Admitting Provider: Andreas Wesley MD    Chief complaint: AICD placement    History of Present Illness     Billy Thompson is a 66 y o  male with no significant past medical history who presented on  at [facility] following a witnessed cardiac arrest   EMS administered 2 defibrillations and on route to the hospital the patient received an additional 6 shocks with 6 doses of epinephrine, 300 mg a aorta room bolus, and an amiodarone infusion  On arrival to the emergency room the patient was moving all extremities and was intubated in the emergency room  Immediately following intubation he was taken to to the cardiac catheterization suite where he under went deployment of a drug-eluting stent to the LAD which was noted to have 100% stenosis  Following this procedure he was admitted to the critical care unit for closer monitoring  During his ICU stay, he was liberated from endotracheal intubation following day as well as all vasopressor support  2D echocardiogram done at that time revealed ejection fraction of 20-25% and severe hypokinesis of the apical anterior/mid anteroseptal portion along with akinesis of the mid inferoseptal and apical walls  While in the ICU, he also developed significant agitation likely secondary to hypoxic encephalopathy  He was started on Precedex and placed on continuous observation  He was eventually taken off the Precedex and became more alert/redirectable on the day of discharge  He also experienced significant urinary retention resulting in replacement of his Ramey catheter on     Urology recommended the Ramey catheter remain in place for 4-5 days after which he can undergo spontaneous voiding trial  He was also started on Flomax for the urinary retention  He was seen by the Cardiology service throughout the hospitalization and it was determined that he would likely need an AICD given his cardiomyopathy  He is being transferred to [facility] for AICD placement and evaluation by electrophysiology  During my interview, the patient had no complaints and denied fevers, chills, sweats, chest pain, shortness of breath, abdominal pain, bowel complaints        Review of Systems   Review of Systems    Past Medical History     Past Medical History:   Diagnosis Date    Arthritis     Coronary artery disease     Renal disorder        Past Surgical History   No past surgical history on file  Social History     History   Alcohol Use No     History   Drug Use No     History   Smoking Status    Former Smoker    Packs/day: 0 25    Years: 30 00    Types: Cigarettes    Quit date: 11/22/2018   Smokeless Tobacco    Never Used       Family History   No family history on file  Medications Prior to Admission     Prior to Admission medications    Medication Sig Start Date End Date Taking? Authorizing Provider   busPIRone (BUSPAR) 15 mg tablet Take 15 mg by mouth 2 (two) times a day    Historical Provider, MD       Allergies     Allergies   Allergen Reactions    Barley Grass Throat Swelling       Objective     Vitals:    12/02/18 1614   BP: 112/67   BP Location: Right arm   Pulse: 88   Resp: 18   Temp: 98 6 °F (37 °C)   TempSrc: Oral   SpO2: 95%   Weight: 90 6 kg (199 lb 11 8 oz)   Height: 5' 11" (1 803 m)     Body mass index is 27 86 kg/m²  No intake or output data in the 24 hours ending 12/02/18 1652  Invasive Devices     Peripheral Intravenous Line            Peripheral IV 11/29/18 Right Antecubital 2 days          Drain            Urethral Catheter 16 Fr  4 days                Physical Exam  GENERAL: Appears well-developed and well-nourished    Appears in no acute distress   HEENT: Normocephalic and atraumatic  No scleral icterus  PERRLA  EOMI B/L  No oropharyngeal edema  MM moist    NECK: Neck supple with no lymphadenopathy  Trachea midline  No JVD  CARDIOVASCULAR: S1 and S2 are present  Regular rate and rhythm  No murmurs, rubs, or gallops  RESPIRATORY: CTA B/L, no rales, rhonci or wheezes  Normal respiratory expansion  ABDOMINAL: Bowel sounds present in all 4 quadrants, non-tender, soft, non-distended  No organomegaly, rebound, or guarding  EXTREMITIES: 2+ DP and PT pulses bilaterally; no cyanosis, clubbing, edema  ROM intact  JOSEPH x4   MUSCULOSKELETAL: No joint tenderness, deformity or swelling, full range of motion without pain  NEUROLOGIC: Patient is alert and oriented to person, place, and time  No sensory or motor deficits  CN 2-12 intact  Plantars downgoing bilaterally  Speech fluent  SKIN: Skin is warm and dry  No skin lesions are present  No rashes  PSYCHIATRIC: Normal mood and affect     Lab Results: I have personally reviewed pertinent reports      CBC:   Results from last 7 days  Lab Units 12/02/18  0509 11/30/18  0431   WBC Thousand/uL 12 70* 10 69*   RBC Million/uL 4 17 3 95   HEMOGLOBIN g/dL 12 5 11 8*   HEMATOCRIT % 37 4 34 8*   MCV fL 90 88   MCH pg 30 0 29 9   MCHC g/dL 33 4 33 9   RDW % 13 8 13 4   MPV fL 9 6 9 5   PLATELETS Thousands/uL 353 349   NRBC AUTO /100 WBCs  --  0   NEUTROS PCT %  --  72   LYMPHS PCT %  --  13*   MONOS PCT %  --  11   EOS PCT %  --  3   BASOS PCT %  --  0   NEUTROS ABS Thousands/µL  --  7 75*   LYMPHS ABS Thousands/µL  --  1 34   MONOS ABS Thousand/µL  --  1 17   EOS ABS Thousand/µL  --  0 28   , Chemistry Profile:   Results from last 7 days  Lab Units 12/02/18  0509  11/29/18  0539   POTASSIUM mmol/L 4 0  < > 3 5   CHLORIDE mmol/L 103  < > 104   CO2 mmol/L 23  < > 23   BUN mg/dL 13  < > 13   CREATININE mg/dL 0 91  < > 0 84   CALCIUM mg/dL 8 8  < > 8 7   AST U/L  --   --  43   ALT U/L  --   --  65   ALK PHOS U/L  --   --  78   EGFR ml/min/1 73sq m 94  < > 98   < > = values in this interval not displayed  , Coagulation Studies:   Results from last 7 days  Lab Units 11/28/18  0429   PROTIME seconds 15 4*   INR  1 23*   , Cardiac Studies:   , Additional Labs:   Results from last 7 days  Lab Units 12/02/18  0509  11/29/18  0539  11/27/18  0617   MAGNESIUM mg/dL 2 1  < > 1 9  < > 2 4   PHOSPHORUS mg/dL  --   --  3 0  < > 2 3*   AMMONIA umol/L  --   --   --   --  12   < > = values in this interval not displayed  , iSTAT CHEM 8:   Results from last 7 days  Lab Units 12/02/18  0509   ANION GAP mmol/L 11   EGFR ml/min/1 73sq m 94   HEMOGLOBIN g/dL 12 5   , ABG:   Results from last 7 days  Lab Units 11/28/18  2257   PH ART  7 473*   PCO2 ART mm Hg 32 1*   PO2 ART mm Hg 62 4*   HCO3 ART mmol/L 23 0   BASE EXC ART mmol/L 0 1   ABG SOURCE  Artery   , Toxicology:   , Last A1C/Lipid Panel/Thyroid Panel:   Lab Results   Component Value Date    HGBA1C 7 6 (H) 11/23/2018    TRIG 117 11/22/2018    HDL 37 (L) 11/22/2018    LDLCALC 81 11/22/2018    LHV7QKYAMAGI 5 993 (H) 11/22/2018    FREET4 0 89 11/22/2018       Imaging: I have personally reviewed pertinent films in PACS  Ct Head Wo Contrast    Result Date: 11/23/2018  Narrative: CT BRAIN - WITHOUT CONTRAST INDICATION:   S/p Cardiac arrest, impulsivity  COMPARISON:  None  TECHNIQUE:  CT examination of the brain was performed  In addition to axial images, coronal 2D reformatted images were created and submitted for interpretation  Radiation dose length product (DLP) for this visit:  900 mGy-cm   This examination, like all CT scans performed in the [facility], was performed utilizing techniques to minimize radiation dose exposure, including the use of iterative reconstruction and automated exposure control  IMAGE QUALITY:  Diagnostic  FINDINGS: PARENCHYMA:  No intracranial mass, mass effect or midline shift  No CT signs of acute infarction  No acute parenchymal hemorrhage   VENTRICLES AND EXTRA-AXIAL SPACES: Normal for the patient's age  VISUALIZED ORBITS AND PARANASAL SINUSES:  Unremarkable  CALVARIUM AND EXTRACRANIAL SOFT TISSUES:  Normal      Impression: No acute intracranial abnormality  Workstation performed: [id number]     X-ray Chest 1 View    Result Date: 11/22/2018  Narrative: CHEST INDICATION:   Endotracheal tube positioning  COMPARISON:  None EXAM PERFORMED/VIEWS:  XR CHEST 1 VIEW FINDINGS: Tip of endotracheal tube 5 cm proximal to the ashwini  Enteric tube projects over the gastric fundus  Mild cardiomegaly  No pleural effusion  Central pulmonary vascular congestion  Subsegmental atelectasis in the right upper and midlung  No pneumothorax  No evidence of fracture or soft tissue collection  Impression: 1  Endotracheal tube in expected position  2   Subsegmental atelectasis in the right upper and midlung  No pneumothorax  Workstation performed: [id number]     Xr Chest Portable Icu    Result Date: 11/29/2018  Narrative: CHEST INDICATION:   hypoxia  COMPARISON:  Chest radiograph 11/26/2018 EXAM PERFORMED/VIEWS:  XR CHEST PORTABLE ICU FINDINGS: Heart shadow is enlarged but unchanged from prior exam  There is a left basilar opacity and mild blunting of the left costophrenic angle  The remainder the lungs are clear  No pneumothorax  Osseous structures appear within normal limits for patient age  Impression: 1  Left basilar opacity compatible with small effusion and a component of atelectasis or infection  2   Stable mild cardiomegaly  Workstation performed: [id number]     Xr Chest Portable Icu    Result Date: 11/26/2018  Narrative: CHEST INDICATION:   questionable aspiration  COMPARISON:  11/23/2018 EXAM PERFORMED/VIEWS:  XR CHEST PORTABLE ICU FINDINGS: Cardiomediastinal silhouette appears enlarged  The nasogastric and endotracheal tubes have been removed  The lungs are clear  No pneumothorax or pleural effusion    Pulmonary vessels are normal  Osseous structures appear within normal limits for patient age  Impression: No acute cardiopulmonary disease  Cardiomegaly  Workstation performed: [id number]     Xr Chest Portable Icu    Result Date: 11/23/2018  Narrative: CHEST INDICATION:   hypoxic respiratory failure  COMPARISON:  Chest x-ray 11/22/2018 EXAM PERFORMED/VIEWS:  XR CHEST PORTABLE ICU FINDINGS:  Endotracheal tube redemonstrated, tip terminates approximately 2 5 cm from the ashwini  Enteric feeding tube is seen extending below the level of the hemidiaphragms into the right upper abdomen  Lung volumes are low  No pneumothorax is seen  The previously seen linear platelike atelectasis in the right midlung field appears intervally resolved  There is now some linear platelike atelectasis seen in the bilateral lower lung fields  The lungs otherwise appear grossly clear  Some mild pulmonary vascular prominence is redemonstrated which appears mildly intervally improved  The cardiac and mediastinal contours and the bones are stable  There has been no other significant interval change  Impression: Endotracheal and enteric feeding tubes as described  Low lung volumes with interval resolution of previously seen right midlung field atelectasis  Some platelike atelectasis is now seen in the left lower lung field  Interval improvement in previously seen pulmonary vascular prominence/congestion  No other significant interval change  Workstation performed: [id number]     Urinalysis:   Results from last 7 days  Lab Units 11/28/18  0508   SPEC GRAV UA  1 015        Urine Micro:        EKG, Pathology, and Other Studies: I have personally reviewed pertinent reports  Medications given in Emergency Department       Assessment and Plan     1  Cardiac arrest:  Patient requires AICD placement here with further EPS study  · Continue aspirin, Lipitor, Lasix  · F/U EP consult  · PT/OT    2   STEMI:  Drug-eluting stent placed in the LAD  · Continue aspirin, Lipitor, Brilinta, metoprolol, Ace inhibitor  · Cardiac diet    3  Anxiety  · Continue BuSpar    4  Toxic metabolic encephalopathy:  Patient no longer encephalopathic and is AAO x3    5  Hemoglobin A1c:  Hemoglobin A1c 7 6 on 11/23/2018  · Continue insulin sliding scale with q i d  Sugar checks (170s-200s)    6  Leukocytosis:  WBC 12 7  Patient remains afebrile, vital signs stable with normal procalcitonin  No sign of infection at this time  · Continue to monitor WBC, temperature, vital signs    Code Status: Level 1 - Full Code  VTE Pharmacologic Prophylaxis: Enoxaparin (Lovenox)   VTE Mechanical Prophylaxis: sequential compression device  Admission Status: INPATIENT     Admission Time  I spent 1 hour admitting the patient  This involved direct patient contact where I performed a full history and physical, reviewing previous records, and reviewing laboratory and other diagnostic studies      Claudette Conradi, MD  Internal Medicine  PGY-1

## 2018-12-02 NOTE — DISCHARGE SUMMARY
Discharge Summary - [facility] Internal Medicine    Patient Information: Merle Lo 66 y o  male MRN: [id number]  Unit/Bed#:  Encounter: [id number]    Discharging Physician / Practitioner: Fay Vance MD  PCP: No primary care provider on file  Admission Date: 11/22/2018  Discharge Date: 12/02/18    Reason for Admission:  Witnessed cardiac arrest    Discharge Diagnoses:     Principal Problem:  ·   Ventricular fibrillation [facility]):  Patient had a witnessed cardiac arrest   Patient was found to be in VFib or V-tach and required shocking in the field x2 with an additional 6 shocks and 6 doses for epinephrine  Amiodarone was initiated  A Samuel airway was used  On arrival patient was found to be hypertensive but moving all extremities  A Samuel airway was exchanged to an injury tracheal tube  Patient was taken to the cath lab  Cardiac catheterization revealed a significant LAD lesion  Add drug-eluting stent was placed  He was loaded with Brilinta and given Angiomax  As well as Integrilin  The patient's only reported past medical history was anxiety for which he takes BuSpar  Patient was returned from the cath lab to the ICU intubated  He was subsequently extubated and developed urinary retention for which Ramey catheter was placed  Urology recommended 4-5 day continuation of the Ramey catheter with voiding trial at that time  He was started on Flomax  Patient was found to be delirious with altered mental status and toxic metabolic changes which required antipsychotic, and use of Precedex aggressive behavior  Patient subsequently returned to normal behavior  Patient needs to have an AICD placed and therefore will be transferred to [place] for further EPS study  Patient will continue on Lipitor, aspirin, Lasix  Active Problems:  ·   Anxiety:  Continue BuSpar, Zyprexa  ·   STEMI (ST elevation myocardial infarction) [facility]):  Drug-eluting stent placed to the LAD    Continue supportive medications including aspirin, Brilinta, metoprolol, ACE-inhibitor  ·   CAD (coronary artery disease): As above aspirin, metoprolol, ACE-inhibitor, Brilinta  ·   Cardiac arrest [facility]):  Secondary to LAD lesion status post drug-eluting stent  ·   Toxic metabolic encephalopathy:  Clearing  Continue BuSpar and Zyprexa for now  Patient was BuSpar prior to admission  Zyprexa may be able to be weaned if he is doing clinically better  · Hyperglycemia unclear etiology continue sliding scale insulin, check hemoglobin A1c  · Leukocytosis:  No evidence for fever  Patient did have an elevated procalcitonin earlier in the week  No evidence for infection at this time  But should consider further surveillance before putting in the AICD  Would send a procalcitonin repeat  Resolved Problems:    Transaminitis    Bandemia    STEVIE (acute kidney injury) [facility])      Consultations During Hospital Stay:  · Cardiology  · Urology    Procedures Performed:     · Cardiac catheterization with drug-eluting stent placed to LAD lesion  · 2D echo:  Ejection fraction 20-25%  Severe hypokinesis the able anterior mid anterior septum and entire inferior wall  Mild mitral, pulmonic, aortic and tricuspid valve regurgitation  · Chest x-ray 11/29/2018  Left basilar opacity compatible small effusion and a component of atelectasis or infection stable mild cardiomegaly  Significant Findings / Test Results:     · As above  · White count 12 70 (slightly up from yesterday 10 69), procalcitonin had been elevated at 0 58  · Blood cultures have been negative    Incidental Findings:   · None    Test Results Pending at Discharge (will require follow up):    · None     Outpatient Tests Requested:  · None    Complications:  Urinary retention    Hospital Course:     Armando Marcelino is a 66 y o  male patient who originally presented to the hospital on 11/22/2018 due to witness V-tach arrest   Patient complained of not feeling well at home suddenly he collapsed  Upon arrival EMS found him to be in VFib V-tach arrest   Patient was shocked a times and given several rounds of ACLS medications occluding epinephrine and amiodarone bolus with drip  Rosc was achieved with sinus tachycardia as the rhythm  Patient was taken emergency to the cath lab as a STEMI  A left anterior descending lesion was noted and a drug-eluting stent was placed  Patient was subsequently extubated and displayed combative behavior with delirium  Likely secondary to toxic metabolic encephalopathy  Patient was treated with antipsychotic medications and ultimately did improved  Recommendations are for patient had an AICD placed  Evaluation for infection showed no obvious disease process  Course was complicated by urinary retention  Ramey catheter will remain for the next 4-5 days with Flomax in place and then a voiding trial can be prescribed per Urology  Condition at Discharge: good     Discharge Day Visit / Exam:     Subjective:  Patient is awake sitting at the table working on his computer    He appears in no acute distress  Vitals: Blood Pressure: 120/66 (12/02/18 0742)  Pulse: 82 (12/02/18 0742)  Temperature: 98 5 °F (36 9 °C) (12/02/18 0700)  Temp Source: Oral (12/02/18 0700)  Respirations: 20 (12/02/18 0700)  Height: 6' (182 9 cm) (11/22/18 1521)  Weight - Scale: 93 8 kg (206 lb 12 7 oz) (12/02/18 0300)  SpO2: 95 % (12/02/18 0804)  Exam:   Physical Exam    General well-developed moderately overweight male no acute distress normocephalic atraumatic pupils equal round and reactive to light extraocular muscles intact mucous membranes are moist neck is supple there is no JVD no lymph nodes no carotid bruits chest is decreased with no rhonchi rales or wheezes  Cardiovascular regular rate rhythm positive S1 and S2 no S3-S4 murmur or gallop  Abdomen obese soft nontender nondistended with positive bowel sounds no hepatosplenomegaly no guarding or rebound  Neurologically patient awake alert oriented cranial nerves 2-12 appear to be intact    Discharge instructions/Information to patient and family:   See after visit summary for information provided to patient and family  Provisions for Follow-Up Care:  See after visit summary for information related to follow-up care and any pertinent home health orders  Disposition:     [address] Transfer to [facility] [address] to [facility] SNF:   · Not Applicable to this Patient - Not Applicable to this Patient    Planned Readmission:  Being readmitted to the [address] [place] [address]     Discharge Statement:  I spent 40 minutes discharging the patient  Discussed with PAC and excepting attending as well as Dr Tonny Vega This time was spent on the day of discharge  I had direct contact with the patient on the day of discharge  Greater than 50% of the total time was spent examining patient, answering all patient questions, arranging and discussing plan of care with patient as well as directly providing post-discharge instructions  Additional time then spent on discharge activities  Discharge Medications:  See after visit summary for reconciled discharge medications provided to patient and family        ** Please Note: This note has been constructed using a voice recognition system **

## 2018-12-03 ENCOUNTER — ANESTHESIA (INPATIENT)
Dept: NON INVASIVE DIAGNOSTICS | Facility: HOSPITAL | Age: 58
DRG: 226 | End: 2018-12-03
Payer: COMMERCIAL

## 2018-12-03 ENCOUNTER — APPOINTMENT (INPATIENT)
Dept: RADIOLOGY | Facility: HOSPITAL | Age: 58
DRG: 226 | End: 2018-12-03
Payer: COMMERCIAL

## 2018-12-03 PROBLEM — E11.9 TYPE 2 DIABETES MELLITUS, WITHOUT LONG-TERM CURRENT USE OF INSULIN (HCC): Status: ACTIVE | Noted: 2018-12-03

## 2018-12-03 LAB
ANION GAP SERPL CALCULATED.3IONS-SCNC: 5 MMOL/L (ref 4–13)
BUN SERPL-MCNC: 13 MG/DL (ref 5–25)
CALCIUM SERPL-MCNC: 9.5 MG/DL (ref 8.3–10.1)
CHLORIDE SERPL-SCNC: 104 MMOL/L (ref 100–108)
CO2 SERPL-SCNC: 26 MMOL/L (ref 21–32)
CREAT SERPL-MCNC: 0.87 MG/DL (ref 0.6–1.3)
ERYTHROCYTE [DISTWIDTH] IN BLOOD BY AUTOMATED COUNT: 14 % (ref 11.6–15.1)
GFR SERPL CREATININE-BSD FRML MDRD: 96 ML/MIN/1.73SQ M
GLUCOSE SERPL-MCNC: 160 MG/DL (ref 65–140)
GLUCOSE SERPL-MCNC: 171 MG/DL (ref 65–140)
GLUCOSE SERPL-MCNC: 177 MG/DL (ref 65–140)
GLUCOSE SERPL-MCNC: 180 MG/DL (ref 65–140)
HCT VFR BLD AUTO: 38.1 % (ref 36.5–49.3)
HGB BLD-MCNC: 12.5 G/DL (ref 12–17)
MCH RBC QN AUTO: 29.8 PG (ref 26.8–34.3)
MCHC RBC AUTO-ENTMCNC: 32.8 G/DL (ref 31.4–37.4)
MCV RBC AUTO: 91 FL (ref 82–98)
PLATELET # BLD AUTO: 373 THOUSANDS/UL (ref 149–390)
PMV BLD AUTO: 10 FL (ref 8.9–12.7)
POTASSIUM SERPL-SCNC: 3.9 MMOL/L (ref 3.5–5.3)
RBC # BLD AUTO: 4.2 MILLION/UL (ref 3.88–5.62)
SODIUM SERPL-SCNC: 135 MMOL/L (ref 136–145)
WBC # BLD AUTO: 10.2 THOUSAND/UL (ref 4.31–10.16)

## 2018-12-03 PROCEDURE — 33249 INSJ/RPLCMT DEFIB W/LEAD(S): CPT | Performed by: PHYSICIAN ASSISTANT

## 2018-12-03 PROCEDURE — 85027 COMPLETE CBC AUTOMATED: CPT | Performed by: STUDENT IN AN ORGANIZED HEALTH CARE EDUCATION/TRAINING PROGRAM

## 2018-12-03 PROCEDURE — G8978 MOBILITY CURRENT STATUS: HCPCS

## 2018-12-03 PROCEDURE — G8987 SELF CARE CURRENT STATUS: HCPCS

## 2018-12-03 PROCEDURE — 02HK3KZ INSERTION OF DEFIBRILLATOR LEAD INTO RIGHT VENTRICLE, PERCUTANEOUS APPROACH: ICD-10-PCS | Performed by: INTERNAL MEDICINE

## 2018-12-03 PROCEDURE — 0JH608Z INSERTION OF DEFIBRILLATOR GENERATOR INTO CHEST SUBCUTANEOUS TISSUE AND FASCIA, OPEN APPROACH: ICD-10-PCS | Performed by: INTERNAL MEDICINE

## 2018-12-03 PROCEDURE — C1894 INTRO/SHEATH, NON-LASER: HCPCS | Performed by: PHYSICIAN ASSISTANT

## 2018-12-03 PROCEDURE — 02H63KZ INSERTION OF DEFIBRILLATOR LEAD INTO RIGHT ATRIUM, PERCUTANEOUS APPROACH: ICD-10-PCS | Performed by: INTERNAL MEDICINE

## 2018-12-03 PROCEDURE — C1730 CATH, EP, 19 OR FEW ELECT: HCPCS | Performed by: PHYSICIAN ASSISTANT

## 2018-12-03 PROCEDURE — 71045 X-RAY EXAM CHEST 1 VIEW: CPT

## 2018-12-03 PROCEDURE — 93620 COMP EP EVL R AT VEN PAC&REC: CPT | Performed by: PHYSICIAN ASSISTANT

## 2018-12-03 PROCEDURE — 33249 INSJ/RPLCMT DEFIB W/LEAD(S): CPT | Performed by: INTERNAL MEDICINE

## 2018-12-03 PROCEDURE — 93620 COMP EP EVL R AT VEN PAC&REC: CPT | Performed by: INTERNAL MEDICINE

## 2018-12-03 PROCEDURE — G8979 MOBILITY GOAL STATUS: HCPCS

## 2018-12-03 PROCEDURE — C1721 AICD, DUAL CHAMBER: HCPCS

## 2018-12-03 PROCEDURE — G8988 SELF CARE GOAL STATUS: HCPCS

## 2018-12-03 PROCEDURE — 99254 IP/OBS CNSLTJ NEW/EST MOD 60: CPT | Performed by: INTERNAL MEDICINE

## 2018-12-03 PROCEDURE — C1898 LEAD, PMKR, OTHER THAN TRANS: HCPCS

## 2018-12-03 PROCEDURE — C1777 LEAD, AICD, ENDO SINGLE COIL: HCPCS

## 2018-12-03 PROCEDURE — 80048 BASIC METABOLIC PNL TOTAL CA: CPT | Performed by: STUDENT IN AN ORGANIZED HEALTH CARE EDUCATION/TRAINING PROGRAM

## 2018-12-03 PROCEDURE — 97167 OT EVAL HIGH COMPLEX 60 MIN: CPT

## 2018-12-03 PROCEDURE — 82948 REAGENT STRIP/BLOOD GLUCOSE: CPT

## 2018-12-03 PROCEDURE — 99153 MOD SED SAME PHYS/QHP EA: CPT | Performed by: PHYSICIAN ASSISTANT

## 2018-12-03 PROCEDURE — 97163 PT EVAL HIGH COMPLEX 45 MIN: CPT

## 2018-12-03 RX ORDER — LIDOCAINE HYDROCHLORIDE 10 MG/ML
INJECTION, SOLUTION INFILTRATION; PERINEURAL CODE/TRAUMA/SEDATION MEDICATION
Status: COMPLETED | OUTPATIENT
Start: 2018-12-03 | End: 2018-12-03

## 2018-12-03 RX ORDER — MIDAZOLAM HYDROCHLORIDE 1 MG/ML
INJECTION INTRAMUSCULAR; INTRAVENOUS CODE/TRAUMA/SEDATION MEDICATION
Status: COMPLETED | OUTPATIENT
Start: 2018-12-03 | End: 2018-12-03

## 2018-12-03 RX ORDER — FENTANYL CITRATE 50 UG/ML
INJECTION, SOLUTION INTRAMUSCULAR; INTRAVENOUS CODE/TRAUMA/SEDATION MEDICATION
Status: COMPLETED | OUTPATIENT
Start: 2018-12-03 | End: 2018-12-03

## 2018-12-03 RX ORDER — GENTAMICIN SULFATE 40 MG/ML
INJECTION, SOLUTION INTRAMUSCULAR; INTRAVENOUS CODE/TRAUMA/SEDATION MEDICATION
Status: COMPLETED | OUTPATIENT
Start: 2018-12-03 | End: 2018-12-03

## 2018-12-03 RX ORDER — LIDOCAINE HYDROCHLORIDE 20 MG/ML
INJECTION, SOLUTION INFILTRATION; PERINEURAL CODE/TRAUMA/SEDATION MEDICATION
Status: COMPLETED | OUTPATIENT
Start: 2018-12-03 | End: 2018-12-03

## 2018-12-03 RX ADMIN — BUSPIRONE HYDROCHLORIDE 15 MG: 10 TABLET ORAL at 18:33

## 2018-12-03 RX ADMIN — IOHEXOL 10 ML: 350 INJECTION, SOLUTION INTRAVENOUS at 17:29

## 2018-12-03 RX ADMIN — ACETAMINOPHEN 975 MG: 325 TABLET, FILM COATED ORAL at 23:32

## 2018-12-03 RX ADMIN — TAMSULOSIN HYDROCHLORIDE 0.4 MG: 0.4 CAPSULE ORAL at 18:33

## 2018-12-03 RX ADMIN — FENTANYL CITRATE 50 MCG: 50 INJECTION, SOLUTION INTRAMUSCULAR; INTRAVENOUS at 16:19

## 2018-12-03 RX ADMIN — TICAGRELOR 90 MG: 90 TABLET ORAL at 08:39

## 2018-12-03 RX ADMIN — FUROSEMIDE 20 MG: 20 TABLET ORAL at 08:40

## 2018-12-03 RX ADMIN — TICAGRELOR 90 MG: 90 TABLET ORAL at 21:47

## 2018-12-03 RX ADMIN — MELATONIN 6 MG: at 21:47

## 2018-12-03 RX ADMIN — MIDAZOLAM 1 MG: 1 INJECTION INTRAMUSCULAR; INTRAVENOUS at 17:22

## 2018-12-03 RX ADMIN — LISINOPRIL 10 MG: 10 TABLET ORAL at 08:40

## 2018-12-03 RX ADMIN — LIDOCAINE HYDROCHLORIDE 20 ML: 10 INJECTION, SOLUTION INFILTRATION; PERINEURAL at 16:36

## 2018-12-03 RX ADMIN — ATORVASTATIN CALCIUM 40 MG: 40 TABLET, FILM COATED ORAL at 18:33

## 2018-12-03 RX ADMIN — GENTAMICIN SULFATE 80 MG: 40 INJECTION, SOLUTION INTRAMUSCULAR; INTRAVENOUS at 17:44

## 2018-12-03 RX ADMIN — MIDAZOLAM 1 MG: 1 INJECTION INTRAMUSCULAR; INTRAVENOUS at 16:11

## 2018-12-03 RX ADMIN — FENTANYL CITRATE 50 MCG: 50 INJECTION, SOLUTION INTRAMUSCULAR; INTRAVENOUS at 17:14

## 2018-12-03 RX ADMIN — METOPROLOL TARTRATE 25 MG: 25 TABLET, FILM COATED ORAL at 08:40

## 2018-12-03 RX ADMIN — ASPIRIN 81 MG 81 MG: 81 TABLET ORAL at 08:40

## 2018-12-03 RX ADMIN — INSULIN LISPRO 1 UNITS: 100 INJECTION, SOLUTION INTRAVENOUS; SUBCUTANEOUS at 21:48

## 2018-12-03 RX ADMIN — MIDAZOLAM 1 MG: 1 INJECTION INTRAMUSCULAR; INTRAVENOUS at 17:40

## 2018-12-03 RX ADMIN — BUSPIRONE HYDROCHLORIDE 15 MG: 10 TABLET ORAL at 08:39

## 2018-12-03 RX ADMIN — LIDOCAINE HYDROCHLORIDE 30 ML: 20 INJECTION, SOLUTION INFILTRATION; PERINEURAL at 17:26

## 2018-12-03 RX ADMIN — FENTANYL CITRATE 50 MCG: 50 INJECTION, SOLUTION INTRAMUSCULAR; INTRAVENOUS at 17:31

## 2018-12-03 RX ADMIN — MIDAZOLAM 1 MG: 1 INJECTION INTRAMUSCULAR; INTRAVENOUS at 16:33

## 2018-12-03 RX ADMIN — LIDOCAINE HYDROCHLORIDE 10 ML: 10 INJECTION, SOLUTION INFILTRATION; PERINEURAL at 17:35

## 2018-12-03 RX ADMIN — VANCOMYCIN HYDROCHLORIDE 1250 MG: 10 INJECTION, POWDER, LYOPHILIZED, FOR SOLUTION INTRAVENOUS at 15:25

## 2018-12-03 RX ADMIN — FENTANYL CITRATE 50 MCG: 50 INJECTION, SOLUTION INTRAMUSCULAR; INTRAVENOUS at 16:10

## 2018-12-03 NOTE — OP NOTE
ELECTROPHYSIOLOGY  OPERATIVE REPORT  PATIENT NAME: Billy Zhang  :  1960  MRN: [id number]  Date of surgery: 18  Surgeon: Jadiel Jefferson DO Pt Location: Cath Lab    PROCEDURE PERFORMED:   1) comprehensive electrophysiologic study with induction of arrhythmia attempted      Preoperative Medications:  Vancomycin     ANESTHESIA:  Conscious sedation    PREOPERATIVE DIAGNOSIS:  Large anterior wall myocardial infarction with cardiac arrest and post revascularization nonsustained ventricular tachycardia  Ejection fraction post revascularization is 20- 25%  Greater than one week out patient is having nonsustained ventricular tachycardia  POSTOPERATIVE DIAGNOSIS:  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    Informed Consent: Risks, benefits, and alternatives discussed with patient and any family present  The patient understands risks, which include but are not limited to life threatening  bleeding, infection, air around lungs, blood around the heart     Procedure Description:  After informed consent was obtained, the patient was brought to the electrophysiology laboratory NPO  A time out was called and the patient was properly identified  The patient was pre-medicated as above  Catheters were placed in the right atrium and right ventricle and his bundle position these were quadripolar catheters  This was done through right common femoral venous access  A Guera and a radha catheter were used    Basic cycle length in sinus rhythm 847 milliseconds AH interval 73 milliseconds HV interval 50 milliseconds MT interval 153 milliseconds QRS duration 101 milliseconds QT interval 388 milliseconds    Wenckebach with atrial pacing 300 milliseconds    The atrial effective refractory period at 500 milliseconds was 280 milliseconds    The AV torres effective refractory period at 500 milliseconds was less than or equal to 280 milliseconds    With triple extrastimuli at a basic cycle length of 400 milliseconds S2 of 240 milliseconds S3 of 220 milliseconds an S4 of 200 milliseconds sustained monomorphic ventricular tachycardia was induced with a cycle length of 224 milliseconds  This was hemodynamically unstable ventricular tachycardia with an apical exit site consistent with the patient's previous anterior apical myocardial infarction  The ventricular tachycardia was right bundle-branch block morphology negative V3 through V6 and negative two three and AVF likely inferior apical exit site  Patient was about to be defibrillated when the tachycardia terminated  ontrast:  None  Findings:  Inducible monomorphic ventricular tachycardia  The patient tolerated the procedure well  Plan: This patient has history of large anterior wall and apical wall myocardial infarction with late nonsustained ventricular tachycardia and inducible hemodynamically significant monomorphic ventricular tachycardia  A defibrillator will be placed

## 2018-12-03 NOTE — UTILIZATION REVIEW
Gerber Navarrete MD Physician Signed Hospitalist  Discharge Summaries Date of Service: 12/2/2018  1:51 PM         []Hide copied text  []Morgan for attribution information  Discharge Summary - [facility]     Patient Information: Angela Cade 66 y o  male MRN: [id number]  Unit/Bed#:  Encounter: [id number]     Discharging Physician / Practitioner: Gerber Navarrete MD  PCP: No primary care provider on file  Admission Date: 11/22/2018  Discharge Date: 12/02/18     Reason for Admission:  Witnessed cardiac arrest     Discharge Diagnoses:      Principal Problem:  ·   Ventricular fibrillation [facility]):  Patient had a witnessed cardiac arrest   Patient was found to be in VFib or V-tach and required shocking in the field x2 with an additional 6 shocks and 6 doses for epinephrine  Amiodarone was initiated  A Samuel airway was used  On arrival patient was found to be hypertensive but moving all extremities  A Samuel airway was exchanged to an injury tracheal tube  Patient was taken to the cath lab  Cardiac catheterization revealed a significant LAD lesion  Add drug-eluting stent was placed  He was loaded with Brilinta and given Angiomax  As well as Integrilin  The patient's only reported past medical history was anxiety for which he takes BuSpar  Patient was returned from the cath lab to the ICU intubated  He was subsequently extubated and developed urinary retention for which Ramey catheter was placed  Urology recommended 4-5 day continuation of the Ramey catheter with voiding trial at that time  He was started on Flomax  Patient was found to be delirious with altered mental status and toxic metabolic changes which required antipsychotic, and use of Precedex aggressive behavior  Patient subsequently returned to normal behavior  Patient needs to have an AICD placed and therefore will be transferred to [place] for further EPS study    Patient will continue on Lipitor, aspirin, Lasix  Active Problems:  ·   Anxiety:  Continue BuSpar, Zyprexa  ·   STEMI (ST elevation myocardial infarction) [facility]):  Drug-eluting stent placed to the LAD  Continue supportive medications including aspirin, Brilinta, metoprolol, ACE-inhibitor  ·   CAD (coronary artery disease): As above aspirin, metoprolol, ACE-inhibitor, Brilinta  ·   Cardiac arrest [facility]):  Secondary to LAD lesion status post drug-eluting stent  ·   Toxic metabolic encephalopathy:  Clearing  Continue BuSpar and Zyprexa for now  Patient was BuSpar prior to admission  Zyprexa may be able to be weaned if he is doing clinically better  · Hyperglycemia unclear etiology continue sliding scale insulin, check hemoglobin A1c  · Leukocytosis:  No evidence for fever  Patient did have an elevated procalcitonin earlier in the week  No evidence for infection at this time  But should consider further surveillance before putting in the AICD  Would send a procalcitonin repeat  Resolved Problems:    Transaminitis    Bandemia    STEVIE (acute kidney injury) ([facility] )        Consultations During Hospital Stay:  · Cardiology  · Urology     Procedures Performed:      · Cardiac catheterization with drug-eluting stent placed to LAD lesion  · 2D echo:  Ejection fraction 20-25%  Severe hypokinesis the able anterior mid anterior septum and entire inferior wall  Mild mitral, pulmonic, aortic and tricuspid valve regurgitation  · Chest x-ray 11/29/2018  Left basilar opacity compatible small effusion and a component of atelectasis or infection stable mild cardiomegaly      Significant Findings / Test Results:      · As above  · White count 12 70 (slightly up from yesterday 10 69), procalcitonin had been elevated at 0 58  · Blood cultures have been negative     Incidental Findings:   · None     Test Results Pending at Discharge (will require follow up):    · None     Outpatient Tests Requested:  · None     Complications:  Urinary retention     Hospital Course:    Billy Vaughn is a 66 y o  male patient who originally presented to the hospital on 11/22/2018 due to witness V-tach arrest   Patient complained of not feeling well at home suddenly he collapsed  Upon arrival EMS found him to be in VFib V-tach arrest   Patient was shocked a times and given several rounds of ACLS medications occluding epinephrine and amiodarone bolus with drip  Rosc was achieved with sinus tachycardia as the rhythm  Patient was taken emergency to the cath lab as a STEMI  A left anterior descending lesion was noted and a drug-eluting stent was placed  Patient was subsequently extubated and displayed combative behavior with delirium  Likely secondary to toxic metabolic encephalopathy  Patient was treated with antipsychotic medications and ultimately did improved  Recommendations are for patient had an AICD placed  Evaluation for infection showed no obvious disease process  Course was complicated by urinary retention  Ramey catheter will remain for the next 4-5 days with Flomax in place and then a voiding trial can be prescribed per Urology      Condition at Discharge: good      Discharge Day Visit / Exam:      Subjective:  Patient is awake sitting at the table working on his computer    He appears in no acute distress  Vitals: Blood Pressure: 120/66 (12/02/18 0742)  Pulse: 82 (12/02/18 0742)  Temperature: 98 5 °F (36 9 °C) (12/02/18 0700)  Temp Source: Oral (12/02/18 0700)  Respirations: 20 (12/02/18 0700)  Height: 6' (182 9 cm) (11/22/18 1521)  Weight - Scale: 93 8 kg (206 lb 12 7 oz) (12/02/18 0300)  SpO2: 95 % (12/02/18 0804)  Exam:   Physical Exam     General well-developed moderately overweight male no acute distress normocephalic atraumatic pupils equal round and reactive to light extraocular muscles intact mucous membranes are moist neck is supple there is no JVD no lymph nodes no carotid bruits chest is decreased with no rhonchi rales or wheezes  Cardiovascular regular rate rhythm positive S1 and S2 no S3-S4 murmur or gallop  Abdomen obese soft nontender nondistended with positive bowel sounds no hepatosplenomegaly no guarding or rebound  Neurologically patient awake alert oriented cranial nerves 2-12 appear to be intact     Discharge instructions/Information to patient and family:   See after visit summary for information provided to patient and family        Provisions for Follow-Up Care:  See after visit summary for information related to follow-up care and any pertinent home health orders        Disposition:      [address] [facility]  [address] Transfer to [address] [place] [address] to [facility]:   · Not Applicable to this Patient - Not Applicable to this Patient     Planned Readmission:  Being readmitted to the [address] [place] [address]     Discharge Statement:  I spent 40 minutes discharging the patient  Discussed with PAC and excepting attending as well as Dr Nadine Prajapati This time was spent on the day of discharge  I had direct contact with the patient on the day of discharge  Greater than 50% of the total time was spent examining patient, answering all patient questions, arranging and discussing plan of care with patient as well as directly providing post-discharge instructions    Additional time then spent on discharge activities      Discharge Medications:  See after visit summary for reconciled discharge medications provided to patient and family        ** Please Note: This note has been constructed using a voice recognition system **

## 2018-12-03 NOTE — PROGRESS NOTES
DUAL CHAMBER AICD PLACED TODAY FOR INDUCIBLE MONOMORPHIC VENTRICULAR TACHYCARDIA  HE WILL NEED STAPLES REMOVED IN 2 WEEKS  HE SHOULD F/U WITH HIS PRIMARY CARDIOLOGIST DR CARLIN ECHAVARRIA IN TWO WEEKS FOR SITE CHECK AND POST-MI F/U

## 2018-12-03 NOTE — UTILIZATION REVIEW
[address]  Utilization Review Department  Phone: [phone]; Fax [phone]  [email or web address]  ATTENTION: Please call with any questions or concerns to [phone]  and carefully listen to the prompts so that you are directed to the right person  Send all requests for admission clinical reviews, approved or denied determinations and any other requests to fax [phone]  All voicemails are confidential         Initial Clinical Review    Admission: Date/Time/Statement: 12/2/18 AT 1721 URGENT INPATIENT TRANSFER FROM [facility] 2ND ACUTE EXTENSIVE STEMI S/P CARDIAC ARREST/ VENT FIB  + CARDIOMYOPATHY WITH LVEF 20-25% - FOR HIGHER LEVEL OF CARE + ELECTROPHYSIOLOGY EVALUATION FOR AICD PLACEMENT     Orders Placed This Encounter   Procedures    Inpatient Admission     Standing Status:   Standing     Number of Occurrences:   1     Order Specific Question:   Admitting Physician     Answer:   Gina Caceres [[id number]]     Order Specific Question:   Level of Care     Answer:   Med Surg [16]     Order Specific Question:   Estimated length of stay     Answer:   More than 2 Midnights     Order Specific Question:   Certification     Answer:   I certify that inpatient services are medically necessary for this patient for a duration of greater than two midnights  See H&P and MD Progress Notes for additional information about the patient's course of treatment         Date/Time/Mode of Arrival: Chief complaint:  12/2/18 AT 1721 URGENT INPATIENT TRANSFER FROM [facility] 2ND ACUTE EXTENSIVE STEMI S/P CARDIAC ARREST/ VENT FIB  + CARDIOMYOPATHY WITH LVEF 20-25% - FOR FOR HIGHER LEVEL OF CARE + ELECTROPHYSIOLOGY EVALUATION FOR AICD PLACEMENT    Chief complaint: AICD placement     History of Present Illness      Billy Godinez is a 66 y o  male with no significant past medical history who presented on 11/22 at  following a witnessed cardiac arrest   EMS administered 2 defibrillations and on route to the hospital the patient received an additional 6 shocks with 6 doses of epinephrine, 300 mg a aorta room bolus, and an amiodarone infusion  On arrival to the emergency room the patient was moving all extremities and was intubated in the emergency room  Immediately following intubation he was taken to to the cardiac catheterization suite where he under went deployment of a drug-eluting stent to the LAD which was noted to have 100% stenosis  Following this procedure he was admitted to the critical care unit for closer monitoring      During his ICU stay, he was liberated from endotracheal intubation following day as well as all vasopressor support  2D echocardiogram done at that time revealed ejection fraction of 20-25% and severe hypokinesis of the apical anterior/mid anteroseptal portion along with akinesis of the mid inferoseptal and apical walls  While in the ICU, he also developed significant agitation likely secondary to hypoxic encephalopathy  He was started on Precedex and placed on continuous observation  He was eventually taken off the Precedex and became more alert/redirectable on the day of discharge  He also experienced significant urinary retention resulting in replacement of his Ramey catheter on 11/29  Urology recommended the Ramey catheter remain in place for 4-5 days after which he can undergo spontaneous voiding trial   He was also started on Flomax for the urinary retention  He was seen by the Cardiology service throughout the hospitalization and it was determined that he would likely need an AICD given his cardiomyopathy  He is being transferred to [facility] for AICD placement and evaluation by electrophysiology         Review of Systems     As per HPI    Physical Exam  GENERAL: Appears well-developed and well-nourished  Appears in no acute distress   HEENT: Normocephalic and atraumatic  No scleral icterus  PERRLA  EOMI B/L  No oropharyngeal edema  MM moist    NECK: Neck supple with no lymphadenopathy  Trachea midline  No JVD  CARDIOVASCULAR: S1 and S2 are present  Regular rate and rhythm  No murmurs, rubs, or gallops  RESPIRATORY: CTA B/L, no rales, rhonci or wheezes  Normal respiratory expansion  ABDOMINAL: Bowel sounds present in all 4 quadrants, non-tender, soft, non-distended  No organomegaly, rebound, or guarding  EXTREMITIES: 2+ DP and PT pulses bilaterally; no cyanosis, clubbing, edema  ROM intact   JOSEPH x4         ED Vital Signs:   ED Triage Vitals [12/02/18 1614]   Temperature Pulse Respirations Blood Pressure SpO2   98 6 °F (37 °C) 88 18 112/67 95 %      Temp Source Heart Rate Source Patient Position - Orthostatic VS BP Location FiO2 (%)   Oral -- Lying Right arm --      Pain Score       No Pain        Wt Readings from Last 1 Encounters:   12/03/18 89 9 kg (198 lb 3 1 oz)      12/02 0701  12/03 0700 12/03 0701  12/03 1145  Most Recent    Temperature (°F) 97 4-98 6 98  98 (36 7)    Pulse 66-88 79  79    Respirations 16-18 16  16    Blood Pressure 94//73 129/81  129/81    SpO2 (%) 91-95 94  94        LABS/Diagnostic Test Results:   CBC  Results from last 7 days  Lab Units 12/02/18  0509 11/30/18  0431   WBC Thousand/uL 12 70* 10 69*   RBC Million/uL 4 17 3 95   HEMOGLOBIN g/dL 12 5 11 8*   HEMATOCRIT % 37 4 34 8*   MCV fL 90 88   MCH pg 30 0 29 9   MCHC g/dL 33 4 33 9   RDW % 13 8 13 4   MPV fL 9 6 9 5   PLATELETS Thousands/uL 353 349   NRBC AUTO /100 WBCs  --  0   NEUTROS PCT %  --  72   LYMPHS PCT %  --  13*   MONOS PCT %  --  11   EOS PCT %  --  3   BASOS PCT %  --  0   NEUTROS ABS Thousands/µL  --  7 75*   LYMPHS ABS Thousands/µL  --  1 34   MONOS ABS Thousand/µL  --  1 17   EOS ABS Thousand/µL  --  0 28     Results from last 7 days  Lab Units 12/02/18  0509   11/29/18  0539   POTASSIUM mmol/L 4 0  < > 3 5   CHLORIDE mmol/L 103  < > 104   CO2 mmol/L 23  < > 23   BUN mg/dL 13  < > 13 CREATININE mg/dL 0 91  < > 0 84   CALCIUM mg/dL 8 8  < > 8 7   AST U/L  --   --  43   ALT U/L  --   --  65   ALK PHOS U/L  --   --  78   EGFR ml/min/1 73sq m 94  < > 98     Results from last 7 days  Lab Units 11/28/18  0429   PROTIME seconds 15 4*   INR   1 23*     Results from last 7 days  Lab Units 12/02/18  0509   11/29/18  0539   11/27/18  0617   MAGNESIUM mg/dL 2 1  < > 1 9  < > 2 4   PHOSPHORUS mg/dL  --   --  3 0  < > 2 3*   AMMONIA umol/L  --   --   --   --  12     Results from last 7 days  Lab Units 12/02/18  0509   ANION GAP mmol/L 11   EGFR ml/min/1 73sq m 94   HEMOGLOBIN g/dL 12 5     Results from last 7 days  Lab Units 11/28/18  2257   PH ART   7 473*   PCO2 ART mm Hg 32 1*   PO2 ART mm Hg 62 4*   HCO3 ART mmol/L 23 0   BASE EXC ART mmol/L 0 1   ABG SOURCE   Artery         Past Medical/Surgical History: Active Ambulatory Problems     Diagnosis Date Noted    [facility] 11/22/2018    Anxiety 11/22/2018    STEMI (ST elevation myocardial infarction) ([facility] ) 11/22/2018    CAD (coronary artery disease) 11/22/2018    Cardiac arrest [facility])      Resolved Ambulatory Problems     Diagnosis Date Noted    Transaminitis 11/22/2018    Bandemia 11/22/2018    STEVIE (acute kidney injury) ([facility] ) 11/22/2018    Toxic metabolic encephalopathy 47/55/2593     Past Medical History:   Diagnosis Date    Arthritis     Coronary artery disease     Renal disorder        Admitting Diagnosis: VF (ventricular fibrillation) ([facility] ) [I49 [id number]]    Age/Sex: 66 y o  male      Assessment and Plan      1  Cardiac arrest:  Patient requires AICD placement here with further EPS study  · Continue aspirin, Lipitor, Lasix  · F/U EP consult  · PT/OT     2  STEMI:  Drug-eluting stent placed in the LAD  · Continue aspirin, Lipitor, Brilinta, metoprolol, Ace inhibitor  · Cardiac diet     3  Anxiety  · Continue BuSpar     4  Toxic metabolic encephalopathy:  Patient no longer encephalopathic and is AAO x3     5   Hemoglobin A1c: Hemoglobin A1c 7 6 on 11/23/2018  · Continue insulin sliding scale with q i d  Sugar checks (170s-200s)     6  Leukocytosis:  WBC 12 7  Patient remains afebrile, vital signs stable with normal procalcitonin  No sign of infection at this time    · Continue to monitor WBC, temperature, vital signs     Code Status: Level 1 - Full Code    VTE Pharmacologic Prophylaxis: Enoxaparin (Lovenox)   VTE Mechanical Prophylaxis: sequential compression device    Admission Status: INPATIENT        Admission Orders:  12/2/18 AT 1721  ADMIT INPATIENT  TELEMETRY  ST SEGMENT MONITORING  VS Q4HRS    Elevate HOB  FALL PRECAUTIONS  Up with Assistance  SCD    Diet Cardiovascular; Cardiac    IV ACCESS    Scheduled Meds:   Current Facility-Administered Medications:   acetaminophen 975 mg Oral Q6H PRN    aspirin 81 mg Oral Daily    atorvastatin 40 mg Oral Daily With Dinner    busPIRone 15 mg Oral BID    enoxaparin 40 mg Subcutaneous Daily    furosemide 20 mg Oral Daily    insulin lispro 1-5 Units Subcutaneous HS    insulin lispro 1-6 Units Subcutaneous TID AC    lisinopril 10 mg Oral Daily    melatonin 6 mg Oral HS    metoprolol tartrate 25 mg Oral Q12H JUDITH    nicotine 14 mg Transdermal Daily    oxyCODONE 2 5 mg Oral Q4H PRN    tamsulosin 0 4 mg Oral Daily With Dinner    ticagrelor 90 mg Oral Q12H [address]        PRN Meds:     acetaminophen    oxyCODONE    CONSULT ELECTROPHYSIOLOGY

## 2018-12-03 NOTE — PLAN OF CARE
Problem: OCCUPATIONAL THERAPY ADULT  Goal: Performs self-care activities at highest level of function for planned discharge setting  See evaluation for individualized goals  Treatment Interventions: ADL retraining, Functional transfer training, UE strengthening/ROM, Endurance training, Patient/family training, Equipment evaluation/education, Compensatory technique education, Energy conservation, Activityengagement, Continued evaluation  Equipment Recommended: Raised toilet seat       See flowsheet documentation for full assessment, interventions and recommendations  Limitation: Decreased ADL status, Decreased UE strength, Decreased Safe judgement during ADL, Decreased cognition, Decreased endurance, Decreased self-care trans, Decreased high-level ADLs  Prognosis: Fair  Assessment: Pt is a 66year old male seen for OT evaluation s/p transfer to [facility] following an admission to [facility] 11/22/18 following a witnessed cardiac arrest   EMS administered 2 defibrillations and on route to the hospital the pt received 6 additional shocks with epinephrine 300 mg a aorta room bolus, and an amiodarone infusion  Immediately following intubation he was taken to to the cardiac catheterization suite where he under went deployment of a drug-eluting stent to the LAD which was noted to have 100% stenosis  Following this procedure he was admitted to the critical care unit for closer monitoring  Pt improved his overall and then transferred to [facility] for ICD evaluation prior to eventual discharge  Pt with active OT orders and up with assistance  Pt lives with his wife and adult son in a 3SH with 2STE  Pt was I with ADLs, IADLs and functional mobility PTA  Pt is a  and works full-time  Pt is currently demonstrating the following occupational deficits: UB bathing with Andrew, LB bathing with modA, UB dressing with Andrew, LB dressing with modA, toileting with Andrew, and functional transfers with Andrew    Impairments that are currently impacting his independence completing this occupations include: endurance, balance, cognition, activity tolerance and functional mobility  Overall, pt scored 50/100 on the Barthel Index  Recommend home OT upon discharge  Pt to continue to benefit from skilled occupational therapy services while in the hospital to maximize functioning and independence in daily activities  See below for details       OT Discharge Recommendation: Home OT  OT - OK to Discharge:  (when medically stable)      Comments: Keyana Belcher, NILSON, OTR/L

## 2018-12-03 NOTE — OCCUPATIONAL THERAPY NOTE
OccupationalTherapy Evaluation     Patient Name: Sara Leon Date: 12/3/2018  Problem List  Patient Active Problem List   Diagnosis    [facility]    Anxiety    STEMI (ST elevation myocardial infarction) ([facility] )    CAD (coronary artery disease)    Cardiac arrest ([facility] )    Type 2 diabetes mellitus, without long-term current use of insulin ([facility] )     Past Medical History  Past Medical History:   Diagnosis Date    Arthritis     Coronary artery disease     Renal disorder      Past Surgical History  No past surgical history on file  12/03/18 1400   Note Type   Note type Eval only   Restrictions/Precautions   Other Precautions Telemetry; Fall Risk   Pain Assessment   Pain Assessment No/denies pain   Pain Score No Pain   Home Living   Type of Home House   Home Layout Multi-level  [facility] with 2 MALICK, 4 steps to 2nd floor and 9 steps to bed/bath)   Bathroom Shower/Tub Tub only   Bathroom Toilet Standard   Bathroom Equipment Shower chair   Home Equipment Walker   Additional Comments Pt lives with his wife in a [place] with 2 MALICK (13 steps to bed and bath)  Pt lives with wife and adult son  Wife works full-time but in 9 days she will have off of work for 5 weeks  Prior Function   Level of [place] Independent with ADLs and functional mobility   Lives With Spouse;Son;Family   Receives Help From Family   ADL Assistance Independent   IADLs Independent   Falls in the last 6 months 0   Vocational Full time employment   Comments Pt was I with ADLs, IADLs and functional mobility PTA  Pt is a  and works full-time as a manager of [employer]   Lifestyle   Autonomy Pt was I with ADLs, IADLs and functional mobility PTA  Pt is a  and works full-time as a   Reciprocal Relationships Wife, son, family   Service to Others Pt is a manager of a [employer]   Intrinsic Gratification Pt enjoys making jewelry     Psychosocial   Psychosocial (WDL) WDL   Subjective Subjective Pt demonstrated impulsivity during fucntional transfers and functional mobility tasks  ADL   Eating Assistance 7  Independent   Grooming Assistance 7  Independent   UB Bathing Assistance 4  Minimal Assistance   LB Bathing Assistance 3  Moderate Assistance   UB Dressing Assistance 4  Minimal Assistance   LB Dressing Assistance 3  Moderate Assistance   Toileting Assistance  4  Minimal Assistance   Bed Mobility   Supine to Sit 5  Supervision   Additional items Increased time required   Sit to Supine 5  Supervision   Additional items Increased time required   Transfers   Sit to Stand 5  Supervision   Additional items Increased time required; Impulsive   Stand to Sit 5  Supervision   Additional items Increased time required; Impulsive;Verbal cues   Toilet transfer 4  Minimal assistance   Additional items Assist x 1; Increased time required;Standard toilet; Impulsive   Balance   Static Sitting Good   Dynamic Sitting Fair +   Static Standing Fair   Dynamic Standing Fair   Ambulatory Fair -   Activity Tolerance   Activity Tolerance Patient limited by fatigue;Patient tolerated treatment well   Medical Staff Made Aware  [address] Per RN, pt okay to see for OT   RUE Assessment   RUE Assessment WFL   LUE Assessment   LUE Assessment WFL   Hand Function   Gross Motor Coordination Impaired   Fine Motor Coordination Impaired   Vision-Basic Assessment   Current Vision Wears glasses all the time   Cognition   Overall Cognitive Status Impaired   Arousal/Participation Alert; Cooperative;Responsive   Attention Attends with cues to redirect   Orientation Level Oriented X4   Memory Within functional limits   Following Commands Follows one step commands without difficulty   Comments Pt demonstrating impulsivity during functional transfers and functional mobility  Assessment   Limitation Decreased ADL status; Decreased UE strength;Decreased Safe judgement during ADL;Decreased cognition;Decreased endurance;Decreased self-care trans;Decreased high-level ADLs   Prognosis Fair   Assessment Pt is a 66year old male seen for OT evaluation s/p transfer to [facility] following an admission to [facility] 11/22/18 following a witnessed cardiac arrest   EMS administered 2 defibrillations and on route to the hospital the pt received 6 additional shocks with epinephrine 300 mg a aorta room bolus, and an amiodarone infusion  Immediately following intubation he was taken to to the cardiac catheterization suite where he under went deployment of a drug-eluting stent to the LAD which was noted to have 100% stenosis  Following this procedure he was admitted to the critical care unit for closer monitoring  Pt improved his overall and then transferred to [facility] for ICD evaluation prior to eventual discharge  Pt with active OT orders and up with assistance  Pt lives with his wife and adult son in a 3SH with 2STE  Pt was I with ADLs, IADLs and functional mobility PTA  Pt is a  and works full-time  Pt is currently demonstrating the following occupational deficits: UB bathing with Andrew, LB bathing with modA, UB dressing with Andrew, LB dressing with modA, toileting with Andrew, and functional transfers with Andrew  Impairments that are currently impacting his independence completing this occupations include: endurance, balance, cognition, activity tolerance and functional mobility  Overall, pt scored 50/100 on the Barthel Index  Recommend home OT upon discharge  Pt to continue to benefit from skilled occupational therapy services while in the hospital to maximize functioning and independence in daily activities  See below for details  Plan   Treatment Interventions ADL retraining;Functional transfer training;UE strengthening/ROM; Endurance training;Patient/family training;Equipment evaluation/education; Compensatory technique education; Energy conservation; Activityengagement;Continued evaluation   Goal Expiration Date 12/13/18   OT Frequency 3-5x/wk   Recommendation   OT Discharge Recommendation Home OT   Equipment Recommended Raised toilet seat   OT - OK to Discharge (when medically stable)   Barthel Index   Feeding 10   Bathing 0   Grooming Score 5   Dressing Score 5   Bladder Score 0   Bowels Score 10   Toilet Use Score 5   Transfers (Bed/Chair) Score 10   Mobility (Level Surface) Score 0   Stairs Score 5   Barthel Index Score 50     Goals:   Pt will complete all ADLs with Nico using DME and AD as needed  Pt will complete functional transfers with Nico using DME and AD as appropriate  Pt will participate in ongoing cognitive assessment with G participation for safe discharge/planning      NILSON Au, OTR/L

## 2018-12-03 NOTE — PROGRESS NOTES
IM Residency Progress Note   Unit/Bed#: [facility] 511-01 Encounter: [id number]  SOD Team A      Billy Zhang 66 y o  male [id number]    Hospital Stay Days: 1      Assessment/Plan:    Principal Problem:    Cardiac arrest [facility])  Active Problems:    Ventricular fibrillation (HCC)    Anxiety    STEMI (ST elevation myocardial infarction) ([facility] )    CAD (coronary artery disease)    1  Status post cardiac arrest  Present on admission  Patient with no precipitating symptoms  Patient currently asymptomatic  Patient required multiple rounds of CPR, defibrillation x8, 6 doses of epinephrine, amiodarone x1, amiodarone drip  Patient required stay in intensive care unit and endotracheal intubation  Catheterization showed 100% stenosis of the LAD for which a drug-eluting stent was placed  Patient was transferred to [facility] for EP study and AICD placement  · Pending EP consult for possible AICD placement  · Patient currently hemodynamically and clinically stable  · Will order incentive spirometry given decreased respiratory effort  · Will order chest x-ray given coarse breath sounds and decreased breath sounds at lung bases  · Will continue oxycodone 2 5 mg p o  Q 4 hours p r n  For breakthrough pain associated with chest compressions  · Will continue to monitor hemodynamically, clinically  · Pending PT/OT consult    2  ST elevation myocardial infarction  Will continue aspirin, Lipitor, Brilinta, metoprolol, Ace inhibitor  3   Diabetes mellitus  Unknown to patient prior to admission  A1c 7 6 on 11/23/2018  · Will continue sliding scale insulin , blood sugar checks AC/HS    4  Generalized anxiety disorder  · Will continue home BuSpar  5  Nicotine dependence  · Will continue nicotine patch  · Will educate patient on benefits of tobacco cessation    6  Leukocytosis  Present on admission  Improved to 10 on 12/03/2018  · Will continue to trend CBC daily    7   Urinary retention  No history of urinary retention prior to admission  · Will continue Ramey catheter for now and consider discontinuing after procedure  · Continue Flomax      Disposition:  Continue inpatient care  Pending evaluation by electrophysiology, possible AICD placement  Patient will remain hospitalized pending EP study given high risk of sudden cardiac death without AICD in place given previous arrest, findings on ECHO and NSVT on telemetry  Subjective:   Patient examined at bedside  Patient without acute complaint  Patient denies any chest, shortness of breath abdominal pain  Patient refers that is difficult to sit secondary to pain associated with previous chest compressions  All other review of systems negative at this time  Vitals: Temp (24hrs), Av °F (36 7 °C), Min:97 4 °F (36 3 °C), Max:98 6 °F (37 °C)  Current: Temperature: 98 °F (36 7 °C)  Vitals:    18 0009 18 0327 18 0445 18 0722   BP: 94/52 101/55  129/81   BP Location: Right arm Right arm     Pulse: 78 66  79   Resp: 18 16  16   Temp: (!) 97 4 °F (36 3 °C) 97 6 °F (36 4 °C)  98 °F (36 7 °C)   TempSrc: Oral Oral     SpO2: 94% 91%  94%   Weight:   89 9 kg (198 lb 3 1 oz)    Height:        Body mass index is 27 64 kg/m²  I/O last 24 hours: In: 480 [P O :480]  Out: 1325 [Urine:1325]      Physical Exam   Constitutional: He is oriented to person, place, and time  He appears well-developed and well-nourished  No distress  HENT:   Head: Normocephalic and atraumatic  Mouth/Throat: No oropharyngeal exudate  Eyes: Conjunctivae are normal  No scleral icterus  Neck: Normal range of motion  Neck supple  No JVD present  No thyromegaly present  Cardiovascular: Normal rate, regular rhythm and normal heart sounds  Exam reveals no gallop and no friction rub  No murmur heard  Pulmonary/Chest: He exhibits tenderness  Decreased respiratory effort secondary to musculoskeletal pain  Patient with decreased lung sounds at the lung bases  Patient with coarse expiratory breath sounds  Abdominal: Soft  Bowel sounds are normal  He exhibits no distension  There is no tenderness  Genitourinary:   Genitourinary Comments: Ramey catheter in place   Musculoskeletal: Normal range of motion  He exhibits no edema or deformity  Neurological: He is alert and oriented to person, place, and time  No cranial nerve deficit  He exhibits normal muscle tone  Skin: Skin is warm and dry  No rash noted  He is not diaphoretic  No erythema  Psychiatric:   Flat affect   Nursing note and vitals reviewed          Invasive Devices     Peripheral Intravenous Line            Peripheral IV 12/03/18 Right Forearm less than 1 day          Drain            Urethral Catheter 16 Fr  5 days                          Labs:   Recent Results (from the past 24 hour(s))   Fingerstick Glucose (POCT)    Collection Time: 12/02/18 11:57 AM   Result Value Ref Range    POC Glucose 204 (H) 65 - 140 mg/dl   Fingerstick Glucose (POCT)    Collection Time: 12/02/18  4:11 PM   Result Value Ref Range    POC Glucose 218 (H) 65 - 140 mg/dl   Fingerstick Glucose (POCT)    Collection Time: 12/02/18  9:10 PM   Result Value Ref Range    POC Glucose 158 (H) 65 - 140 mg/dl   CBC and Platelet    Collection Time: 12/03/18  4:40 AM   Result Value Ref Range    WBC 10 20 (H) 4 31 - 10 16 Thousand/uL    RBC 4 20 3 88 - 5 62 Million/uL    Hemoglobin 12 5 12 0 - 17 0 g/dL    Hematocrit 38 1 36 5 - 49 3 %    MCV 91 82 - 98 fL    MCH 29 8 26 8 - 34 3 pg    MCHC 32 8 31 4 - 37 4 g/dL    RDW 14 0 11 6 - 15 1 %    Platelets 593 805 - 154 Thousands/uL    MPV 10 0 8 9 - 12 7 fL   Basic metabolic panel    Collection Time: 12/03/18  4:40 AM   Result Value Ref Range    Sodium 135 (L) 136 - 145 mmol/L    Potassium 3 9 3 5 - 5 3 mmol/L    Chloride 104 100 - 108 mmol/L    CO2 26 21 - 32 mmol/L    ANION GAP 5 4 - 13 mmol/L    BUN 13 5 - 25 mg/dL    Creatinine 0 87 0 60 - 1 30 mg/dL    Glucose 171 (H) 65 - 140 mg/dL    Calcium 9 5 8 3 - 10 1 mg/dL    eGFR 96 ml/min/1 73sq m   Fingerstick Glucose (POCT)    Collection Time: 12/03/18  6:46 AM   Result Value Ref Range    POC Glucose 180 (H) 65 - 140 mg/dl       Radiology Results: I have personally reviewed pertinent reports  Other Diagnostic Testing:   I have personally reviewed pertinent reports          Active Meds:   Current Facility-Administered Medications   Medication Dose Route Frequency    acetaminophen (TYLENOL) tablet 975 mg  975 mg Oral Q6H PRN    aspirin chewable tablet 81 mg  81 mg Oral Daily    atorvastatin (LIPITOR) tablet 40 mg  40 mg Oral Daily With Dinner    busPIRone (BUSPAR) tablet 15 mg  15 mg Oral BID    enoxaparin (LOVENOX) subcutaneous injection 40 mg  40 mg Subcutaneous Daily    furosemide (LASIX) tablet 20 mg  20 mg Oral Daily    insulin lispro (HumaLOG) 100 units/mL subcutaneous injection 1-5 Units  1-5 Units Subcutaneous HS    insulin lispro (HumaLOG) 100 units/mL subcutaneous injection 1-6 Units  1-6 Units Subcutaneous TID AC    lisinopril (ZESTRIL) tablet 10 mg  10 mg Oral Daily    melatonin tablet 6 mg  6 mg Oral HS    metoprolol tartrate (LOPRESSOR) tablet 25 mg  25 mg Oral Q12H [address]    nicotine (NICODERM CQ) 14 mg/24hr TD 24 hr patch 14 mg  14 mg Transdermal Daily    oxyCODONE (ROXICODONE) IR tablet 2 5 mg  2 5 mg Oral Q4H PRN    tamsulosin (FLOMAX) capsule 0 4 mg  0 4 mg Oral Daily With Dinner    ticagrelor (BRILINTA) tablet 90 mg  90 mg Oral Q12H [address]         VTE Pharmacologic Prophylaxis: Enoxaparin (Lovenox)  VTE Mechanical Prophylaxis: sequential compression device    Natali Kwon MD

## 2018-12-03 NOTE — ANESTHESIA PREPROCEDURE EVALUATION
Review of Systems/Medical History  Patient summary reviewed  Chart reviewed  No history of anesthetic complications     Cardiovascular  Past MI 0-3 months, CAD ,   Comment: Sinus rhythm with Fusion complexes  Cannot rule out Anteroseptal infarct (cited on or before 22-NOV-2018)  Prolonged QT  Abnormal ECG    Confirmed by [facility] MARSHA SHER ([id number]) on 11/30/2018 8:10:55 PM    Specimen Collected: 11/30/18 15:31  Last Resulted: 11/30/18 20:10         LEFT VENTRICLE: The ventricle was dilated  Systolic function was markedly reduced  Ejection fraction was estimated in the range of 20 % to 25 %  There was severe hypokinesis of the apical anterior, mid anteroseptal, and entire inferior  wall(s)  There was akinesis of the mid inferoseptal and apical wall(s)  The wall was thinned at the apex and mid inferoseptum  DOPPLER: Left ventricular diastolic function parameters were normal      RIGHT VENTRICLE: The size was normal  Systolic function was normal  Wall thickness was normal      RIGHT ATRIUM: Size was normal      MITRAL VALVE: Valve structure was normal  There was normal leaflet separation  DOPPLER: There was no evidence for stenosis  There was mild regurgitation      AORTIC VALVE: The valve was trileaflet  Leaflets exhibited normal thickness and normal cuspal separation  The valve was not well visualized  DOPPLER: Transaortic velocity was within the normal range  There was no evidence for stenosis  There was mild regurgitation      TRICUSPID VALVE: The valve structure was normal  There was normal leaflet separation  DOPPLER: There was no evidence for stenosis  There was trace regurgitation      PULMONIC VALVE: Leaflets exhibited normal thickness, no calcification, and normal cuspal separation  DOPPLER: The transpulmonic velocity was within the normal range  There was trace regurgitation      PERICARDIUM: There was no pericardial effusion   The pericardium was normal in appearance          LESION #1 INTERVENTION: A balloon angioplasty procedure was performed on the 100 % lesion in the proximal LAD  Following intervention there was an improved angiographic appearance with a 0 % residual stenosis  There was AUDRA 0 flow before  the procedure and AUDRA 3 flow after the procedure  LESION #2 INTERVENTION: A successful percutaneous intervention with balloon angioplasty procedure was performed on the 100 % lesion in the mid LAD  Following intervention there was an improved angiographic appearance with a 0 % residual  stenosis  There was AUDRA 0 flow before the procedure and AUDRA 3 flow after the procedure  CORONARY VESSELS:   --  The coronary circulation is right dominant  --  There was 1-vessel coronary artery disease (LAD)  --  Left main: Normal   --  Proximal LAD: There was a 100 % stenosis  --  Mid LAD: There was a 100 % stenosis  --  Mid circumflex: Angiography showed minor luminal irregularities  --  1st obtuse marginal: There was a diffuse 30 % stenosis  --  RCA: Angiography showed minor luminal irregularities      Prepared and signed by  Rigoberto Miller MD  Signed 11/27/2018 21:33:08,  Pulmonary       GI/Hepatic            Endo/Other  Diabetes poorly controlled type 2 ,      GYN       Hematology   Musculoskeletal    Arthritis     Neurology   Psychology   Anxiety,              Physical Exam    Airway    Mallampati score: II  TM Distance: >3 FB  Neck ROM: full     Dental       Cardiovascular  Cardiovascular exam normal    Pulmonary  Pulmonary exam normal Breath sounds clear to auscultation,     Other Findings        Anesthesia Plan  ASA Score- 3     Anesthesia Type- IV sedation with anesthesia with ASA Monitors  Additional Monitors:   Airway Plan:         Plan Factors- Patient instructed to abstain from smoking on day of procedure       Induction- intravenous  Postoperative Plan- Plan for postoperative opioid use  Informed Consent- Anesthetic plan and risks discussed with patient    I personally reviewed this patient with the CRNA  Discussed and agreed on the Anesthesia Plan with the CRNA             Lab Results   Component Value Date    WBC 10 20 (H) 12/03/2018    HGB 12 5 12/03/2018    HCT 38 1 12/03/2018    MCV 91 12/03/2018     12/03/2018     Lab Results   Component Value Date    GLUCOSE 332 (H) 11/22/2018    CALCIUM 9 5 12/03/2018    K 3 9 12/03/2018    CO2 26 12/03/2018     12/03/2018    BUN 13 12/03/2018    CREATININE 0 87 12/03/2018     Lab Results   Component Value Date    INR 1 23 (H) 11/28/2018    INR 1 36 (H) 11/22/2018    INR 2 09 (H) 11/22/2018    PROTIME 15 4 (H) 11/28/2018    PROTIME 16 7 (H) 11/22/2018    PROTIME 23 2 (H) 11/22/2018     Lab Results   Component Value Date    PTT 84 (H) 11/22/2018     Type and Screen:  A        I, Dr Maral De Guzman, the attending physician, have personally seen and evaluated the patient prior to anesthetic care  I have reviewed the pre-anesthetic record, and other medical records if appropriate to the anesthetic care  If a CRNA is involved in the case, I have reviewed the CRNA assessment, if present, and agree  The patient is in a suitable condition to proceed with my formulated anesthetic plan

## 2018-12-03 NOTE — PHYSICAL THERAPY NOTE
Physical Therapy Evaluation     Patient's Name: Billy Zhang    Admitting Diagnosis  VF (ventricular fibrillation) ([facility] 75 ) [I49 01]    Problem List  Patient Active Problem List   Diagnosis    Ventricular fibrillation ([place] [address] )    Anxiety    STEMI (ST elevation myocardial infarction) ([place] [address] )    CAD (coronary artery disease)    Cardiac arrest ([place] [address] )    Type 2 diabetes mellitus, without long-term current use of insulin ([place] [address] )       Past Medical History  Past Medical History:   Diagnosis Date    Arthritis     Coronary artery disease     Renal disorder        Past Surgical History  No past surgical history on file  12/03/18 7472   Note Type   Note type Eval only   Pain Assessment   Pain Assessment FLACC   Pain Rating: FLACC (Rest) - Face 0   Pain Rating: FLACC (Rest) - Legs 0   Pain Rating: FLACC (Rest) - Activity 0   Pain Rating: FLACC (Rest) - Cry 0   Pain Rating: FLACC (Rest) - Consolability 0   Score: FLACC (Rest) 0   Pain Rating: FLACC (Activity) - Face 1   Pain Rating: FLACC (Activity) - Legs 0   Pain Rating: FLACC (Activity) - Activity 1   Pain Rating: FLACC (Activity) - Cry 1   Pain Rating: FLACC (Activity) - Consolability 0   Score: FLACC (Activity) 3   Home Living   Type of Home House   Home Layout Multi-level  (3 SH 2 MALICK, 4 steps to 2nd floor + 9 steps to bed/bath on 3r)   Bathroom Shower/Tub Tub/shower unit   Bathroom Toilet Standard   Bathroom Equipment Shower chair   Home Equipment Walker   Prior Function   Level of [place] Independent with ADLs and functional mobility   Lives With Spouse;Son;Family   Receives Help From Family   ADL Assistance Independent   IADLs Independent   Falls in the last 6 months 0   Vocational Full time employment   Comments 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 [employer]  Pt's wife works in  at [employer] and is off for 5 weeks (after 9 days of work) for [employer] break     Restrictions/Precautions   Weight Bearing Precautions Per Order No   Other Precautions Telemetry; Fall Risk;Pain   General   Family/Caregiver Present Yes  (wife, mother, sister, son)   Cognition   Overall Cognitive Status Impaired   Arousal/Participation Alert   Attention Within functional limits   Orientation Level Oriented X4   Memory Within functional limits   Following Commands Follows multistep commands with increased time or repetition   RLE Assessment   RLE Assessment WFL  (4/5)   LLE Assessment   LLE Assessment WFL  (4/5)   Bed Mobility   Supine to Sit 5  Supervision   Sit to Supine 5  Supervision   Transfers   Sit to Stand 5  Supervision   Stand to Sit 5  Supervision   Toilet transfer 4  Minimal assistance   Additional items Standard toilet   Ambulation/Elevation   Gait pattern Scissoring  (lateral LOB x4-6 with min A for correction)   Gait Assistance 4  Minimal assist   Additional items Assist x 1; Tactile cues   Assistive Device Rolling walker   Distance 40'x2   Stair Management Assistance 4  Minimal assist   Additional items Assist x 1; Tactile cues   Stair Management Technique One rail L;Two rails; Step to pattern;Reciprocal   Number of Stairs 14   Balance   Static Sitting Fair +   Dynamic Sitting Fair   Static Standing Fair   Dynamic Standing Poor +   Ambulatory Poor   Endurance Deficit   Endurance Deficit Yes   Endurance Deficit Description Fatigue, weaknes, pain   Activity Tolerance   Activity Tolerance Patient limited by fatigue   Nurse Made Aware Yes, RN cleared pt for PT eval   Assessment   Prognosis Good   Problem List Decreased strength;Decreased endurance; Impaired balance;Decreased mobility; Decreased cognition; Impaired judgement;Decreased safety awareness;Pain   Assessment Pt is 66 y o  male seen for PT evaluation s/p transfer [facility] to [facility] on 12/2/2018 w/ Cardiac arrest [facility])  Pt presented to [facility] when she reported she was not feeling well and collapsed    Pt was found to be pulseless and CPR was initiated  While in ICU, pt developed agitation secondary to hypoxic encephalopathy  [facility]  PT consulted to assess pt's functional mobility and d/c needs  Order placed for PT eval and tx, w/ activity as tolerated order  Comorbidities affecting pt's physical performance at time of assessment include: anxiety, STEMI, CAD, DM II  PTA, pt was ambulates unrestricted distances and all terrain and works full time  Personal factors affecting pt at time of IE include: inaccessible home environment, lives in 3 story house, ambulating w/ assistive device, stairs to enter home, inability to ambulate household distances, decreased cognition, limited home support, inability to perform current job functions, inability to perform IADLs and inability to perform ADLs  Please find objective findings from PT assessment regarding body systems outlined above with impairments and limitations including weakness, impaired balance, decreased endurance, gait deviations, pain, decreased activity tolerance, decreased functional mobility tolerance and fall risk  The following objective measures performed on IE also reveal limitations: Barthel Index: 50/100  Pt's clinical presentation is currently unstable/unpredictable seen in pt's presentation of abnormal labs, urethral catheter, pain, need for further cardiac intervention (ICD implantation), telemetry  Pt demonstrated increased LOB in the room with scissoring and increased lateral sway requiring min A for correction  With stairs, he demonstrated alternating reciprocal and step to gait pattern with increased difficulty with reciprocal descending and LOB  Pt to benefit from continued PT tx to address deficits as defined above and maximize level of functional independent mobility and consistency  From PT/mobility standpoint, recommendation at time of d/c would be Home PT with family support pending progress in order to facilitate return to OF  Barriers to Discharge Inaccessible home environment;Decreased caregiver support  (Wife will be working for 9 days then off)   Goals   Patient Goals Get back to work   STG Expiration Date 12/13/18   Treatment Day 1   Plan   Treatment/Interventions Functional transfer training;LE strengthening/ROM; Elevations; Therapeutic exercise; Endurance training;Patient/family training;Equipment eval/education; Bed mobility;Gait training;OT;Spoke to nursing   PT Frequency (3-5x/wk)   Recommendation   Recommendation Home PT; Home with family support   Equipment Recommended Walker   PT - OK to Discharge Yes   Additional Comments When medically stable   Barthel Index   Feeding 10   Bathing 0   Grooming Score 5   Dressing Score 5   Bladder Score 0   Bowels Score 10   Toilet Use Score 5   Transfers (Bed/Chair) Score 10   Mobility (Level Surface) Score 0   Stairs Score 5   Barthel Index Score 50     Goals:  Pt will demonstrate: 1) increased BLE strength >/= 1 grade for improved transfers 2) I supine <> sit transfer  3) I sit <> stand transfer 4) increased dynamic balance >/= 1 grade to decrease risk for falls 5) Amb >/= 600' with mod I without AD and without LOB to return to work 6) up/down 1 flight of stairs with mod I without LOB 7) increased standing tolerance >/=45 minutes for progression back to work      Michelle Gupta, PT, DPT

## 2018-12-03 NOTE — SOCIAL WORK
Met with the patient and his family to complete assessment and explain role of CM  Patient transferred from [facility]  He lives with his wife and son in a two floor home with 2 MALICK  There are 5 steps, landing and 8 steps to the second floor bedroom and bathroom  Prior to admission the patient was independent, drives and is employed as manager of [employer], He uses no DME and has no past snf or HHC  He denies MH and D&A treatment, PCP is Dr Pham Perry  Patient has prescription coverage and uses [employer], [address]  Family will transport at discharge and wife will be off work for several weeks  Wife reports that patient was recommended for snf rehab at [facility] but family feels he is doing better  Explained that therapy evaluations were ordered and we will work on plan based on current needs  CM reviewed d/c planning process including the following: identifying help at home, patient preference for d/c planning needs, Discharge Lounge, [facility] Meds to Bed program, availability of treatment team to discuss questions or concerns patient and/or family may have regarding understanding medications and recognizing signs and symptoms once discharged  CM also encouraged patient to follow up with all recommended appointments after discharge  Patient advised of importance for patient and family to participate in managing patients medical well being  Patient/caregiver received discharge checklist  Content reviewed  Patient/caregiver encouraged to participate in discharge plan of care prior to discharge home

## 2018-12-03 NOTE — CONSULTS
[address] [address]  [phone]                                              Electrophysiology Consult  Billy Zhang 66 y o  male   YOB: 1960 MRN: [id number]  [facility] Encounter: [id number]      Physician Requesting Consult: Rosemary Jacinto MD  Reason for Consult / Principal Problem: Cardiac arrest / VT    Assessment and Plan  1  VT/VF cardiac arrest, needing shock x 6  2  Persistent NSVT  3  Anterior STEMI s/p PCI with CRUZ to pLAD (11/22/18)    Plan  · He presented originally with VT/VF cardiac arrest in the setting of an acute STEMI, and underwent stenting to the occluded LAD  · Echo - 11/23/18 - LVEF 20-25%, severe hypokinesis of apical anterior, mid-anteroseptal and entire inferior walls, mild MR & AI  · ECG - 11/30/18 - NSR, marvin-septal Q waves, QTc 470-490  · Telemetry - NSVT 6 beats  · On metoprolol 25 bid, lisinopril 10, lasix 20, aspirin 81, ticagrelor  · Given continued NSVT, LVEF 20-25% and his presentation with VT/VF arrest at time of STEMI, will get EP study to evaluate for VT and decide on ICD placement    D/W Dr Davila    History of Present Illness   HPI: Dirk Coronel is a 66y o  year old male who originally presented to [facility] with a witnessed cardiac arrest      32-year-old gentleman who originally presented to [facility] ED on 22nd November after a fitness cardiac arrest at home  He was walking down the steps at home, when he suddenly noticed a headache  He went upstairs and laid down in bed, where he passed out  His wife initiated CPR and eventually EMS arrived  He received 2 shocks from EMS out of hospital   He was then taken to [facility] ED, where he had further episodes of VT/VF, for which he was shocked about 6 times  His ECG then revealed an acute anterior STEMI, for which he underwent cardiac catheterization in    He was found to have 100% occluded proximal LAD, which was successfully stented  His further hospital stay was complicated and prolonged, needing intubated for a couple of days, he had metabolic encephalopathy and had aspiration pneumonia needing antibiotics  He improved overall and was then transferred to [facility] for ICD evaluation prior to eventual discharge    No active complaints of significant chest pressure, shortness of breath, palpitations, dizziness  He does continue to have mild chest discomfort overall chest wall related to CPR      Past Medical History:   Diagnosis Date    Arthritis     Coronary artery disease     Renal disorder      No past surgical history on file  No family history on file    Meds/Allergies   all current active meds have been reviewed and current meds: Current Facility-Administered Medications   Medication Dose Route Frequency    acetaminophen (TYLENOL) tablet 975 mg  975 mg Oral Q6H PRN    aspirin chewable tablet 81 mg  81 mg Oral Daily    atorvastatin (LIPITOR) tablet 40 mg  40 mg Oral Daily With Dinner    busPIRone (BUSPAR) tablet 15 mg  15 mg Oral BID    enoxaparin (LOVENOX) subcutaneous injection 40 mg  40 mg Subcutaneous Daily    furosemide (LASIX) tablet 20 mg  20 mg Oral Daily    insulin lispro (HumaLOG) 100 units/mL subcutaneous injection 1-5 Units  1-5 Units Subcutaneous HS    insulin lispro (HumaLOG) 100 units/mL subcutaneous injection 1-6 Units  1-6 Units Subcutaneous TID AC    lisinopril (ZESTRIL) tablet 10 mg  10 mg Oral Daily    melatonin tablet 6 mg  6 mg Oral HS    metoprolol tartrate (LOPRESSOR) tablet 25 mg  25 mg Oral Q12H JUDITH    nicotine (NICODERM CQ) 14 mg/24hr TD 24 hr patch 14 mg  14 mg Transdermal Daily    oxyCODONE (ROXICODONE) IR tablet 2 5 mg  2 5 mg Oral Q4H PRN    tamsulosin (FLOMAX) capsule 0 4 mg  0 4 mg Oral Daily With Dinner    ticagrelor (BRILINTA) tablet 90 mg  90 mg Oral Q12H [address]     Prescriptions Prior to Admission   Medication    busPIRone (BUSPAR) 15 mg tablet     Allergies Allergen Reactions    Barley Grass Throat Swelling     Social History     Social History    Marital status: /Civil Union     Spouse name: N/A    Number of children: N/A    Years of education: N/A     Social History Main Topics    Smoking status: Former Smoker     Packs/day: 0 25     Years: 30 00     Types: Cigarettes     Quit date: 11/22/2018    Smokeless tobacco: Never Used    Alcohol use No    Drug use: No    Sexual activity: Yes     Other Topics Concern    Not on file     Social History Narrative    No narrative on file         Review of Systems  c/o chest discomfort, No c/o palpitations, No c/o shortness of breath, No c/o dizziness or light-headedness, No c/o abdominal pain, no c/o nausea/vomitting  All other systems negative    Vitals:    12/03/18 0009 12/03/18 0327 12/03/18 0445 12/03/18 0722   BP: 94/52 101/55  129/81   BP Location: Right arm Right arm     Pulse: 78 66  79   Resp: 18 16  16   Temp: (!) 97 4 °F (36 3 °C) 97 6 °F (36 4 °C)  98 °F (36 7 °C)   TempSrc: Oral Oral     SpO2: 94% 91%  94%   Weight:   89 9 kg (198 lb 3 1 oz)    Height:         Orthostatic Blood Pressures      Most Recent Value   Blood Pressure  129/81 filed at 12/03/2018 7373   Patient Position - Orthostatic VS  Sitting filed at 12/03/2018 0327        Body mass index is 27 64 kg/m²  Wt Readings from Last 5 Encounters:   12/03/18 89 9 kg (198 lb 3 1 oz)   12/02/18 93 8 kg (206 lb 12 7 oz)   12/02/18 93 kg (205 lb 0 4 oz)     I/O last 3 completed shifts: In: 480 [P O :480]  Out: 1325 [Urine:1325]      Physical Exam    Constitutional:  Billy Zhang appears well, alert and oriented x 3, pleasant and cooperative   No acute distress   HEENT:    Normocephalic, atraumatic   Neck:  Supple, No JVD   Lungs:  Clear to auscultation bilaterally, respirations unlabored    Chest Wall:  Tenderness+    Heart::  Regular rate, Regular rhythm, S1 and S2 normal, no murmur, rub or gallop    Abdomen:  Soft, non-tender, non-distended   Neurological:  Awake, alert, oriented x3  Non-focal exam    Extremities:  No pedal edema, No calf tenderness         Labs:    Results from last 7 days  Lab Units 12/03/18 0440 12/02/18  0509 11/30/18  0431 11/29/18  0539 11/28/18  0429 11/27/18  0617   WBC Thousand/uL 10 20* 12 70* 10 69* 9 84 11 71* 12 18*   HEMOGLOBIN g/dL 12 5 12 5 11 8* 12 9 12 7 10 2*   HEMATOCRIT % 38 1 37 4 34 8* 37 6 37 4 30 2*   RDW % 14 0 13 8 13 4 13 5 13 6 13 5   PLATELETS Thousands/uL 373 353 349 392* 319 245       Results from last 7 days  Lab Units 12/03/18 0440 12/02/18  0509 11/30/18  0431 11/29/18  0539 11/28/18  0429 11/27/18  0617   POTASSIUM mmol/L 3 9 4 0 3 9 3 5 3 3* 3 6   CHLORIDE mmol/L 104 103 104 104 105 106   CO2 mmol/L 26 23 24 23 25 21   MAGNESIUM mg/dL  --  2 1 1 9 1 9 2 0 2 4   BUN mg/dL 13 13 22 13 11 9   CREATININE mg/dL 0 87 0 91 1 07 0 84 0 84 0 67   CALCIUM mg/dL 9 5 8 8 8 6 8 7 8 6 7 0*   AST U/L  --   --   --  43 32 33   ALT U/L  --   --   --  65 62 58   ALK PHOS U/L  --   --   --  78 69 56               Results from last 7 days  Lab Units 11/28/18 0429   INR  1 23*           EKG: NSR, Q waves in anteroseptal  Telemetry: NSR, 1 episode of NSVT this morning  Imaging: Ct Head Wo Contrast    Result Date: 11/23/2018  Narrative: CT BRAIN - WITHOUT CONTRAST INDICATION:   S/p Cardiac arrest, impulsivity  COMPARISON:  None  TECHNIQUE:  CT examination of the brain was performed  In addition to axial images, coronal 2D reformatted images were created and submitted for interpretation  Radiation dose length product (DLP) for this visit:  900 mGy-cm   This examination, like all CT scans performed in the [facility], was performed utilizing techniques to minimize radiation dose exposure, including the use of iterative reconstruction and automated exposure control  IMAGE QUALITY:  Diagnostic  FINDINGS: PARENCHYMA:  No intracranial mass, mass effect or midline shift   No CT signs of acute infarction  No acute parenchymal hemorrhage  VENTRICLES AND EXTRA-AXIAL SPACES:  Normal for the patient's age  VISUALIZED ORBITS AND PARANASAL SINUSES:  Unremarkable  CALVARIUM AND EXTRACRANIAL SOFT TISSUES:  Normal      Impression: No acute intracranial abnormality  Workstation performed: [id number]     X-ray Chest 1 View    Result Date: 11/22/2018  Narrative: CHEST INDICATION:   Endotracheal tube positioning  COMPARISON:  None EXAM PERFORMED/VIEWS:  XR CHEST 1 VIEW FINDINGS: Tip of endotracheal tube 5 cm proximal to the ashwini  Enteric tube projects over the gastric fundus  Mild cardiomegaly  No pleural effusion  Central pulmonary vascular congestion  Subsegmental atelectasis in the right upper and midlung  No pneumothorax  No evidence of fracture or soft tissue collection  Impression: 1  Endotracheal tube in expected position  2   Subsegmental atelectasis in the right upper and midlung  No pneumothorax  Workstation performed: [id number]     Xr Chest Portable Icu    Result Date: 11/29/2018  Narrative: CHEST INDICATION:   hypoxia  COMPARISON:  Chest radiograph 11/26/2018 EXAM PERFORMED/VIEWS:  XR CHEST PORTABLE ICU FINDINGS: Heart shadow is enlarged but unchanged from prior exam  There is a left basilar opacity and mild blunting of the left costophrenic angle  The remainder the lungs are clear  No pneumothorax  Osseous structures appear within normal limits for patient age  Impression: 1  Left basilar opacity compatible with small effusion and a component of atelectasis or infection  2   Stable mild cardiomegaly  Workstation performed: [id number]     Xr Chest Portable Icu    Result Date: 11/26/2018  Narrative: CHEST INDICATION:   questionable aspiration  COMPARISON:  11/23/2018 EXAM PERFORMED/VIEWS:  XR CHEST PORTABLE ICU FINDINGS: Cardiomediastinal silhouette appears enlarged  The nasogastric and endotracheal tubes have been removed  The lungs are clear  No pneumothorax or pleural effusion  Pulmonary vessels are normal  Osseous structures appear within normal limits for patient age  Impression: No acute cardiopulmonary disease  Cardiomegaly  Workstation performed: [id number]     Xr Chest Portable Icu    Result Date: 2018  Narrative: CHEST INDICATION:   hypoxic respiratory failure  COMPARISON:  Chest x-ray 2018 EXAM PERFORMED/VIEWS:  XR CHEST PORTABLE ICU FINDINGS:  Endotracheal tube redemonstrated, tip terminates approximately 2 5 cm from the ashwini  Enteric feeding tube is seen extending below the level of the hemidiaphragms into the right upper abdomen  Lung volumes are low  No pneumothorax is seen  The previously seen linear platelike atelectasis in the right midlung field appears intervally resolved  There is now some linear platelike atelectasis seen in the bilateral lower lung fields  The lungs otherwise appear grossly clear  Some mild pulmonary vascular prominence is redemonstrated which appears mildly intervally improved  The cardiac and mediastinal contours and the bones are stable  There has been no other significant interval change  Impression: Endotracheal and enteric feeding tubes as described  Low lung volumes with interval resolution of previously seen right midlung field atelectasis  Some platelike atelectasis is now seen in the left lower lung field  Interval improvement in previously seen pulmonary vascular prominence/congestion  No other significant interval change   Workstation performed: [id number]       Cardiac testing:   Results for orders placed during the hospital encounter of 18   Echo complete with contrast if indicated    Narrative [address] [place] [id number]  [phone]    Transthoracic Echocardiogram  2D, M-mode, Doppler, and Color Doppler    Study date:  2018    Patient: Ayaka De La Rosa  MR number: [id number]  Account number: [de-identified]  : 1960  Age: 66 years  Gender: Male  Status: Inpatient  Location: Bedside  Height: 72 in  Weight: 218 lb  BP: 107/ 73 mmHg    Indications: Evaluate suspected myocardial infarction  Diagnoses: I21 3 - ST elevation (STEMI) myocardial infarction of unspecified site    Sonographer:  April [facility] Adriana Richards  Interpreting Physician:  Jayce Bateman MD  Referring Physician:  Charlie García PA-C    SUMMARY    LEFT VENTRICLE:  The ventricle was dilated  Systolic function was markedly reduced  Ejection fraction was estimated in the range of 20 % to 25 %  There was severe hypokinesis of the apical anterior, mid anteroseptal, and entire inferior wall(s)  There was akinesis of the mid inferoseptal and apical wall(s)  The wall was thinned at the apex and mid inferoseptum  MITRAL VALVE:  There was mild regurgitation  AORTIC VALVE:  There was mild regurgitation  TRICUSPID VALVE:  There was trace regurgitation  PULMONIC VALVE:  There was trace regurgitation  HISTORY: PRIOR HISTORY: Ventricular fibrillation, STEMI, CAD, STEVIE,    PROCEDURE: The procedure was performed at the bedside  This was a routine study  The transthoracic approach was used  The study included complete 2D imaging, M-mode, complete spectral Doppler, and color Doppler  The heart rate was 57 bpm,  at the start of the study  Images were obtained from the parasternal, apical, subcostal, and suprasternal notch acoustic windows  Intravenous contrast (  6mL of Definity in NSS) was administered to opacify the left ventricle  Image quality  was adequate  LEFT VENTRICLE: The ventricle was dilated  Systolic function was markedly reduced  Ejection fraction was estimated in the range of 20 % to 25 %  There was severe hypokinesis of the apical anterior, mid anteroseptal, and entire inferior  wall(s)  There was akinesis of the mid inferoseptal and apical wall(s)  The wall was thinned at the apex and mid inferoseptum   DOPPLER: Left ventricular diastolic function parameters were normal     RIGHT VENTRICLE: The size was normal  Systolic function was normal  Wall thickness was normal     RIGHT ATRIUM: Size was normal     MITRAL VALVE: Valve structure was normal  There was normal leaflet separation  DOPPLER: There was no evidence for stenosis  There was mild regurgitation  AORTIC VALVE: The valve was trileaflet  Leaflets exhibited normal thickness and normal cuspal separation  The valve was not well visualized  DOPPLER: Transaortic velocity was within the normal range  There was no evidence for stenosis  There was mild regurgitation  TRICUSPID VALVE: The valve structure was normal  There was normal leaflet separation  DOPPLER: There was no evidence for stenosis  There was trace regurgitation  PULMONIC VALVE: Leaflets exhibited normal thickness, no calcification, and normal cuspal separation  DOPPLER: The transpulmonic velocity was within the normal range  There was trace regurgitation  PERICARDIUM: There was no pericardial effusion  The pericardium was normal in appearance      SYSTEM MEASUREMENT TABLES    2D  %FS: 17 8 %  Ao Diam: 3 cm  EDV(Teich): 148 6 ml  EF(Teich): 36 6 %  ESV(Teich): 94 2 ml  IVSd: 1 5 cm  LA Area: 15 6 cm2  LA Diam: 2 9 cm  LVEDV MOD A4C: 196 6 ml  LVEF MOD A4C: 18 8 %  LVESV MOD A4C: 159 7 ml  LVIDd: 5 5 cm  LVIDs: 4 5 cm  LVLd A4C: 10 cm  LVLs A4C: 9 5 cm  LVPWd: 1 3 cm  RA Area: 17 6 cm2  RVIDd: 3 5 cm  SV MOD A4C: 36 9 ml  SV(Teich): 54 4 ml    CW  AR Dec [place]: 1 9 m/s2  AR Dec Time: 2440 3 ms  AR PHT: 707 7 ms  AR Vmax: 4 6 m/s  AR maxP 4 mmHg  MR VTI: 154 7 cm  MR Vmax: 4 3 m/s  MR Vmean: 3 3 m/s  MR maxP 6 mmHg  MR meanP 4 mmHg  TR Vmax: 2 1 m/s  TR maxP 6 mmHg    MM  TAPSE: 1 5 cm    PW  E': 0 m/s  E/E': 15 6  MV A Arthur: 0 9 m/s  MV Dec [place]: 3 9 m/s2  MV DecT: 195 2 ms  MV E Arthur: 0 8 m/s  MV E/A Ratio: 0 8  MV PHT: 56 6 ms  MVA By PHT: 3 9 cm2    [employer] Accredited Echocardiography Laboratory    Prepared and electronically signed by    Yessi Garzon MD  Signed [id number] 15:38:37       No results found for this or any previous visit  No results found for this or any previous visit  No results found for this or any previous visit  Counseling / Coordination of Care  Total floor / unit time spent today 40 minutes  Greater than 50% of total time was spent with the patient and / or family counseling and / or coordination of care  A description of the counseling / coordination of care: Obtained history, performed physical examination, discussed laboratory and imaging results, and explained further plan of care  Jorge Ye

## 2018-12-03 NOTE — PROGRESS NOTES
[facility] Senior Admission Note   Unit/Bed # @DBLINK (RONEY,26780)@ Encounter: [id number]  SOD Team A          Billy Zhang 66 y o  male [id number]       Patient seen and examined  Reviewed H&P per Dr Kendrick Severe  Agree with the assessment and plan except NA       Assessment/Plan: Principal Problem:    Cardiac arrest [facility])  Active Problems:    Ventricular fibrillation (HCC)    Anxiety    STEMI (ST elevation myocardial infarction) (HCC)    CAD (coronary artery disease)         Disposition:  INPATIENT     Expected LOS: >2 Midnights      Dionne Welsh MD

## 2018-12-03 NOTE — PLAN OF CARE
Problem: PHYSICAL THERAPY ADULT  Goal: Performs mobility at highest level of function for planned discharge setting  See evaluation for individualized goals  Treatment/Interventions: Functional transfer training, LE strengthening/ROM, Elevations, Therapeutic exercise, Endurance training, Patient/family training, Equipment eval/education, Bed mobility, Gait training, OT, Spoke to nursing  Equipment Recommended: Courtney Valadez       See flowsheet documentation for full assessment, interventions and recommendations  Prognosis: Good  Problem List: Decreased strength, Decreased endurance, Impaired balance, Decreased mobility, Decreased cognition, Impaired judgement, Decreased safety awareness, Pain  Assessment: Pt is 66 y o  male seen for PT evaluation s/p transfer from [facility] to [facility] on 12/2/2018 w/ Cardiac arrest [facility])  Pt presented to [facility] when she reported she was not feeling well and collapsed  Pt was found to be pulseless and CPR was initiated  While in ICU, pt developed agitation secondary to hypoxic encephalopathy  Pt was transferred to [facility] for AICD placement  PT consulted to assess pt's functional mobility and d/c needs  Order placed for PT eval and tx, w/ activity as tolerated order  Comorbidities affecting pt's physical performance at time of assessment include: anxiety, STEMI, CAD, DM II  PTA, pt was ambulates unrestricted distances and all terrain and works full time  Personal factors affecting pt at time of IE include: inaccessible home environment, lives in 3 story house, ambulating w/ assistive device, stairs to enter home, inability to ambulate household distances, decreased cognition, limited home support, inability to perform current job functions, inability to perform IADLs and inability to perform ADLs   Please find objective findings from PT assessment regarding body systems outlined above with impairments and limitations including weakness, impaired balance, decreased endurance, gait deviations, pain, decreased activity tolerance, decreased functional mobility tolerance and fall risk  The following objective measures performed on IE also reveal limitations: Barthel Index: 50/100  Pt's clinical presentation is currently unstable/unpredictable seen in pt's presentation of abnormal labs, urethral catheter, pain, need for further cardiac intervention (ICD implantation), telemetry  Pt demonstrated increased LOB in the room with scissoring and increased lateral sway requiring min A for correction  With stairs, he demonstrated alternating reciprocal and step to gait pattern with increased difficulty with reciprocal descending and LOB  Pt to benefit from continued PT tx to address deficits as defined above and maximize level of functional independent mobility and consistency  From PT/mobility standpoint, recommendation at time of d/c would be Home PT with family support pending progress in order to facilitate return to PLOF  Barriers to Discharge: Inaccessible home environment, Decreased caregiver support (Wife will be working for 9 days then off)     Recommendation: Home PT, Home with family support     PT - OK to Discharge: Yes    See flowsheet documentation for full assessment

## 2018-12-04 ENCOUNTER — APPOINTMENT (INPATIENT)
Dept: RADIOLOGY | Facility: HOSPITAL | Age: 58
DRG: 226 | End: 2018-12-04
Payer: COMMERCIAL

## 2018-12-04 VITALS
TEMPERATURE: 98.1 F | RESPIRATION RATE: 16 BRPM | BODY MASS INDEX: 27.59 KG/M2 | WEIGHT: 197.09 LBS | HEIGHT: 71 IN | SYSTOLIC BLOOD PRESSURE: 115 MMHG | DIASTOLIC BLOOD PRESSURE: 73 MMHG | OXYGEN SATURATION: 95 % | HEART RATE: 76 BPM

## 2018-12-04 PROBLEM — R33.9 URINARY RETENTION: Status: ACTIVE | Noted: 2018-12-04

## 2018-12-04 LAB
ANION GAP SERPL CALCULATED.3IONS-SCNC: 11 MMOL/L (ref 4–13)
BUN SERPL-MCNC: 15 MG/DL (ref 5–25)
CALCIUM SERPL-MCNC: 8.9 MG/DL (ref 8.3–10.1)
CHLORIDE SERPL-SCNC: 104 MMOL/L (ref 100–108)
CO2 SERPL-SCNC: 22 MMOL/L (ref 21–32)
CREAT SERPL-MCNC: 0.91 MG/DL (ref 0.6–1.3)
ERYTHROCYTE [DISTWIDTH] IN BLOOD BY AUTOMATED COUNT: 14 % (ref 11.6–15.1)
GFR SERPL CREATININE-BSD FRML MDRD: 94 ML/MIN/1.73SQ M
GLUCOSE SERPL-MCNC: 153 MG/DL (ref 65–140)
GLUCOSE SERPL-MCNC: 156 MG/DL (ref 65–140)
GLUCOSE SERPL-MCNC: 225 MG/DL (ref 65–140)
HCT VFR BLD AUTO: 40.2 % (ref 36.5–49.3)
HGB BLD-MCNC: 13.1 G/DL (ref 12–17)
MAGNESIUM SERPL-MCNC: 2.3 MG/DL (ref 1.6–2.6)
MCH RBC QN AUTO: 29.5 PG (ref 26.8–34.3)
MCHC RBC AUTO-ENTMCNC: 32.6 G/DL (ref 31.4–37.4)
MCV RBC AUTO: 91 FL (ref 82–98)
PLATELET # BLD AUTO: 372 THOUSANDS/UL (ref 149–390)
PMV BLD AUTO: 9.9 FL (ref 8.9–12.7)
POTASSIUM SERPL-SCNC: 4 MMOL/L (ref 3.5–5.3)
RBC # BLD AUTO: 4.44 MILLION/UL (ref 3.88–5.62)
SODIUM SERPL-SCNC: 137 MMOL/L (ref 136–145)
WBC # BLD AUTO: 10.99 THOUSAND/UL (ref 4.31–10.16)

## 2018-12-04 PROCEDURE — 80048 BASIC METABOLIC PNL TOTAL CA: CPT | Performed by: STUDENT IN AN ORGANIZED HEALTH CARE EDUCATION/TRAINING PROGRAM

## 2018-12-04 PROCEDURE — 83735 ASSAY OF MAGNESIUM: CPT | Performed by: STUDENT IN AN ORGANIZED HEALTH CARE EDUCATION/TRAINING PROGRAM

## 2018-12-04 PROCEDURE — 99233 SBSQ HOSP IP/OBS HIGH 50: CPT | Performed by: INTERNAL MEDICINE

## 2018-12-04 PROCEDURE — 71046 X-RAY EXAM CHEST 2 VIEWS: CPT

## 2018-12-04 PROCEDURE — 85027 COMPLETE CBC AUTOMATED: CPT | Performed by: STUDENT IN AN ORGANIZED HEALTH CARE EDUCATION/TRAINING PROGRAM

## 2018-12-04 PROCEDURE — 82948 REAGENT STRIP/BLOOD GLUCOSE: CPT

## 2018-12-04 RX ORDER — NICOTINE 21-14-7MG
1 KIT TRANSDERMAL DAILY
Qty: 56 EACH | Refills: 0 | Status: SHIPPED | OUTPATIENT
Start: 2018-12-04

## 2018-12-04 RX ORDER — METFORMIN HYDROCHLORIDE EXTENDED-RELEASE TABLETS 1000 MG/1
1000 TABLET, FILM COATED, EXTENDED RELEASE ORAL
Qty: 30 TABLET | Refills: 0 | Status: SHIPPED | OUTPATIENT
Start: 2018-12-04 | End: 2018-12-04

## 2018-12-04 RX ORDER — ATORVASTATIN CALCIUM 40 MG/1
40 TABLET, FILM COATED ORAL
Qty: 90 TABLET | Refills: 1 | Status: SHIPPED | OUTPATIENT
Start: 2018-12-04 | End: 2018-12-04

## 2018-12-04 RX ORDER — ATORVASTATIN CALCIUM 40 MG/1
40 TABLET, FILM COATED ORAL
Qty: 90 TABLET | Refills: 0 | Status: SHIPPED | OUTPATIENT
Start: 2018-12-04

## 2018-12-04 RX ORDER — TAMSULOSIN HYDROCHLORIDE 0.4 MG/1
0.4 CAPSULE ORAL
Qty: 90 CAPSULE | Refills: 0 | Status: SHIPPED | OUTPATIENT
Start: 2018-12-04

## 2018-12-04 RX ORDER — BUSPIRONE HYDROCHLORIDE 15 MG/1
15 TABLET ORAL 2 TIMES DAILY
Qty: 180 TABLET | Refills: 0 | Status: SHIPPED | OUTPATIENT
Start: 2018-12-04 | End: 2019-11-12 | Stop reason: ALTCHOICE

## 2018-12-04 RX ORDER — NICOTINE 21 MG/24HR
1 PATCH, TRANSDERMAL 24 HOURS TRANSDERMAL DAILY
Qty: 28 PATCH | Refills: 0 | Status: CANCELLED | OUTPATIENT
Start: 2018-12-05

## 2018-12-04 RX ORDER — METOPROLOL SUCCINATE 100 MG/1
100 TABLET, EXTENDED RELEASE ORAL DAILY
Qty: 90 TABLET | Refills: 0 | Status: SHIPPED | OUTPATIENT
Start: 2018-12-04

## 2018-12-04 RX ORDER — FUROSEMIDE 20 MG/1
20 TABLET ORAL DAILY
Qty: 90 TABLET | Refills: 1 | Status: SHIPPED | OUTPATIENT
Start: 2018-12-05 | End: 2018-12-04

## 2018-12-04 RX ORDER — FUROSEMIDE 20 MG/1
20 TABLET ORAL DAILY
Qty: 90 TABLET | Refills: 0 | Status: SHIPPED | OUTPATIENT
Start: 2018-12-05

## 2018-12-04 RX ORDER — TAMSULOSIN HYDROCHLORIDE 0.4 MG/1
0.4 CAPSULE ORAL
Qty: 90 CAPSULE | Refills: 1 | Status: CANCELLED | OUTPATIENT
Start: 2018-12-04

## 2018-12-04 RX ORDER — NICOTINE 21-14-7MG
1 KIT TRANSDERMAL DAILY
Qty: 56 EACH | Refills: 0 | Status: SHIPPED | OUTPATIENT
Start: 2018-12-04 | End: 2018-12-04

## 2018-12-04 RX ORDER — LISINOPRIL 10 MG/1
10 TABLET ORAL DAILY
Qty: 90 TABLET | Refills: 1 | Status: SHIPPED | OUTPATIENT
Start: 2018-12-05 | End: 2018-12-04

## 2018-12-04 RX ORDER — BUSPIRONE HYDROCHLORIDE 15 MG/1
15 TABLET ORAL 2 TIMES DAILY
Qty: 180 TABLET | Refills: 1 | Status: SHIPPED | OUTPATIENT
Start: 2018-12-04 | End: 2018-12-04

## 2018-12-04 RX ORDER — LISINOPRIL 10 MG/1
10 TABLET ORAL DAILY
Qty: 90 TABLET | Refills: 0 | Status: SHIPPED | OUTPATIENT
Start: 2018-12-05

## 2018-12-04 RX ORDER — METOPROLOL SUCCINATE 100 MG/1
100 TABLET, EXTENDED RELEASE ORAL DAILY
Qty: 90 TABLET | Refills: 1 | Status: SHIPPED | OUTPATIENT
Start: 2018-12-04 | End: 2018-12-04

## 2018-12-04 RX ORDER — TAMSULOSIN HYDROCHLORIDE 0.4 MG/1
0.4 CAPSULE ORAL
Qty: 90 CAPSULE | Refills: 1 | Status: SHIPPED | OUTPATIENT
Start: 2018-12-04 | End: 2018-12-04

## 2018-12-04 RX ORDER — METFORMIN HYDROCHLORIDE EXTENDED-RELEASE TABLETS 1000 MG/1
1000 TABLET, FILM COATED, EXTENDED RELEASE ORAL
Qty: 30 TABLET | Refills: 0 | Status: SHIPPED | OUTPATIENT
Start: 2018-12-04

## 2018-12-04 RX ORDER — ASPIRIN 81 MG/1
81 TABLET, CHEWABLE ORAL DAILY
Qty: 90 TABLET | Refills: 0 | Status: SHIPPED | OUTPATIENT
Start: 2018-12-05

## 2018-12-04 RX ORDER — ASPIRIN 81 MG/1
81 TABLET, CHEWABLE ORAL DAILY
Qty: 90 TABLET | Refills: 1 | Status: SHIPPED | OUTPATIENT
Start: 2018-12-05 | End: 2018-12-04

## 2018-12-04 RX ADMIN — INSULIN LISPRO 1 UNITS: 100 INJECTION, SOLUTION INTRAVENOUS; SUBCUTANEOUS at 09:47

## 2018-12-04 RX ADMIN — ASPIRIN 81 MG 81 MG: 81 TABLET ORAL at 09:47

## 2018-12-04 RX ADMIN — INSULIN LISPRO 2 UNITS: 100 INJECTION, SOLUTION INTRAVENOUS; SUBCUTANEOUS at 12:04

## 2018-12-04 RX ADMIN — TICAGRELOR 90 MG: 90 TABLET ORAL at 09:47

## 2018-12-04 RX ADMIN — FUROSEMIDE 20 MG: 20 TABLET ORAL at 09:47

## 2018-12-04 RX ADMIN — LISINOPRIL 10 MG: 10 TABLET ORAL at 09:46

## 2018-12-04 RX ADMIN — NICOTINE 14 MG: 14 PATCH, EXTENDED RELEASE TRANSDERMAL at 09:47

## 2018-12-04 RX ADMIN — METOPROLOL TARTRATE 25 MG: 25 TABLET, FILM COATED ORAL at 09:47

## 2018-12-04 RX ADMIN — ENOXAPARIN SODIUM 40 MG: 40 INJECTION SUBCUTANEOUS at 09:46

## 2018-12-04 RX ADMIN — BUSPIRONE HYDROCHLORIDE 15 MG: 10 TABLET ORAL at 09:47

## 2018-12-04 NOTE — DISCHARGE SUMMARY
IMR Discharge Summary - Medical Billy Zhang 66 y o  male MRN: [id number]    [address] [facility] BE PPHP 5 MED SURG/SD [facility]12[facility]04 Encounter: [id number]    BRIEF OVERVIEW    Admitting Provider: Martir An MD  Discharge Provider: Sera Kothari MD  Primary Care Physician at Discharge: [address]  Discharge To: Home  Facility / Family Member Name: Emily Araujo  Phone Number: [phone]  Admission Date: 12/2/2018     Discharge Date: No discharge date for patient encounter  Primary Discharge Diagnosis  Principal Problem:    Cardiac arrest [facility])  Active Problems:    Ventricular fibrillation (HCC)    Anxiety    STEMI (ST elevation myocardial infarction) (HCC)    CAD (coronary artery disease)    Type 2 diabetes mellitus, without long-term current use of insulin ([facility] [address] )    Urinary retention  Resolved Problems:    * No resolved hospital problems  *      Other Problems Addressed: None    Consulting Providers   EP/Cardiology         Therapeutic Operative Procedures Performed  Stent placement  AICD  Diagnostic Procedures Performed  Bmp, mag, CBC, fingerstick  Discharge Disposition: [address]  Discharged With Lines:   Test Results Pending at Discharge: None    Outpatient Follow-Up  Instructions given for follow up with PCP  Follow up with consulting providers  Follow up arranged with EP, Cardiology, urology  Active Issues Requiring Follow-up   STEMI  Code Status: Level 1 - Full Code  Advance Directive and Living Will: <no information>  Power of :    POLST:      Medications   See after visit summary for reconciled discharge medications provided to patient and family  Allergies  Allergies   Allergen Reactions    Barley Grass Throat Swelling     Discharge Diet: diabetic diet  Activity restrictions: Activity as tolerated       [facility] Course  This was a 53-year-old male with no significant past medical history presented on 11/22 to [facility] following witnessed cardiac arrest   Patient was apparently administered 2 defibrillations per EMS in route  Patient also received 6 shocks and 6 doses of epinephrine  On arrival to emergency department patient was moving all extremities was intubated  Following intubation he was taken the cardiac catheterization and underwent deployment of drug-eluting stent to LAD which noted to have 100% stenosis  Following this he admitted to the critical care unit for closer monitoring  During his ICU to stay he was liberated from endotracheal intubation as well as vasopressor support  New onset diabetes was diagnosed  Echocardiogram done that time showed ejection fraction 20-25%  Troponins trended down  Cardiology saw the patient and  felt that the patient would require AICD  Patient was then transferred to [facility] for AICD placement  During course of stay patient had several beats of nonsustained ventricular tachycardia, what he was asymptomatic at this point in time  Patient had a AICD placed on at 12/3  Patient the next day had his leads checked and they were appropriately placed  Patient otherwise was hemodynamically stable  Patient was given prescriptions for Brilinta, atorvastatin, metoprolol, aspirin, and lisinopril  Patient was also given refill of his meds for his BuSpar, and prescribed new Metformin  Patient was otherwise medically stable  Follow-up arranged were made with both Cardiology, electrophysiology, as well as his primary care physician  Patient also had a episode of urinary retention during his stay so he was started on Flomax and given a follow-up appointment with Urology  Patient on day discharge was hemodynamically stable  Cardiology cleared him for discharge  All questions were answered to apparent satisfaction  VNA services were set up for the patient    Patient was also given referral for cardiac rehab  Labs were not significant on day of discharge  All questions were answered to apparent satisfaction  Patient was discharged in stable condition          Morning Afternoon Evening Bedtime As Needed    aspirin 81 mg chewable tablet   Start taking on: 12/5/2018   Chew 1 tablet (81 mg total) daily   Refills: 0     12/5         atorvastatin 40 mg tablet   Commonly known as: LIPITOR   Take 1 tablet (40 mg total) by mouth daily with dinner   Refills: 0       12/4       busPIRone 15 mg tablet   Commonly known as: BUSPAR   Take 1 tablet (15 mg total) by mouth 2 (two) times a day   Refills: 0        12/4       furosemide 20 mg tablet   Commonly known as: LASIX   Start taking on: 12/5/2018   Take 1 tablet (20 mg total) by mouth daily   Refills: 0     12/5         lisinopril 10 mg tablet   Commonly known as: ZESTRIL   Start taking on: 12/5/2018   Take 1 tablet (10 mg total) by mouth daily   Refills: 0     12/5         metFORMIN 1000 MG (OSM) 24 hr tablet   Commonly known as: FORTAMET   Take 1 tablet (1,000 mg total) by mouth daily with breakfast   Refills: 0     12/5         metoprolol succinate 100 mg 24 hr tablet   Commonly known as: TOPROL-XL   Take 1 tablet (100 mg total) by mouth daily   Refills: 0     12/5         Nicotine 21-14-7 MG/24HR Kit   Place 1 patch on the skin daily   Refills: 0     12/5         tamsulosin 0 4 mg   Commonly known as: FLOMAX   Take 1 capsule (0 4 mg total) by mouth daily with dinner   Refills: 0       12/4       ticagrelor 90 MG   Commonly known as: BRILINTA   Take 1 tablet (90 mg total) by mouth every 12 (twelve) hours   Refills: 0         12/4         Presenting Problem/History of Present Illness  Principal Problem:    [facility]  Active Problems:    Ventricular fibrillation (HCC)    Anxiety    [facility] elevation [facility] infarction) [facility]    CAD (coronary artery disease)    Type 2 diabetes mellitus, without long-term current use of insulin ([facility] ) Urinary retention  Resolved Problems:    * No resolved hospital problems  *        Other Pertinent Test Results  None     Discharge Condition: stable      Discharge  Statement   I spent 30 minutes minutes discharging the patient  This time was spent on the day of discharge  I had direct contact with the patient on the day of discharge  Additional documentation is required if more than 30 minutes were spent on discharge

## 2018-12-04 NOTE — SOCIAL WORK
Patient identified as HRR per criteria  Call made to DC appointment hotline with information as required for CM support follow up  Referral made to out cm

## 2018-12-04 NOTE — PLAN OF CARE
Problem: CARDIOVASCULAR - ADULT  Goal: Maintains optimal cardiac output and hemodynamic stability  INTERVENTIONS:  - Monitor I/O, vital signs and rhythm  - Monitor for S/S and trends of decreased cardiac output i e  bleeding, hypotension  - Administer and titrate ordered vasoactive medications to optimize hemodynamic stability  - Assess quality of pulses, skin color and temperature  - Assess for signs of decreased coronary artery perfusion - ex   Angina  - Instruct patient to report change in severity of symptoms   Outcome: Progressing    Goal: Absence of cardiac dysrhythmias or at baseline rhythm  INTERVENTIONS:  - Continuous cardiac monitoring, monitor vital signs, obtain 12 lead EKG if indicated  - Administer antiarrhythmic and heart rate control medications as ordered  - Monitor electrolytes and administer replacement therapy as ordered   Outcome: Progressing      Problem: RESPIRATORY - ADULT  Goal: Achieves optimal ventilation and oxygenation  INTERVENTIONS:  - Assess for changes in respiratory status  - Assess for changes in mentation and behavior  - Position to facilitate oxygenation and minimize respiratory effort  - Oxygen administration by appropriate delivery method based on oxygen saturation (per order) or ABGs  - Initiate smoking cessation education as indicated  - Encourage broncho-pulmonary hygiene including cough, deep breathe, Incentive Spirometry  - Assess the need for suctioning and aspirate as needed  - Assess and instruct to report SOB or any respiratory difficulty  - Respiratory Therapy support as indicated   Outcome: Progressing      Problem: GENITOURINARY - ADULT  Goal: Maintains or returns to baseline urinary function  INTERVENTIONS:  - Assess urinary function  - Encourage oral fluids to ensure adequate hydration  - Administer IV fluids as ordered to ensure adequate hydration  - Administer ordered medications as needed  - Offer frequent toileting  - Follow urinary retention protocol if ordered   Outcome: Progressing    Goal: Absence of urinary retention  INTERVENTIONS:  - Assess patients ability to void and empty bladder  - Monitor I/O  - Bladder scan as needed  - Discuss with physician/AP medications to alleviate retention as needed  - Discuss catheterization for long term situations as appropriate   Outcome: Progressing    Goal: Urinary catheter remains patent  INTERVENTIONS:  - Assess patency of urinary catheter  - If patient has a chronic lizarraga, consider changing catheter if non-functioning  - Follow guidelines for intermittent irrigation of non-functioning urinary catheter   Outcome: Progressing      Problem: METABOLIC, FLUID AND ELECTROLYTES - ADULT  Goal: Electrolytes maintained within normal limits  INTERVENTIONS:  - Monitor labs and assess patient for signs and symptoms of electrolyte imbalances  - Administer electrolyte replacement as ordered  - Monitor response to electrolyte replacements, including repeat lab results as appropriate  - Instruct patient on fluid and nutrition as appropriate   Outcome: Progressing    Goal: Fluid balance maintained  INTERVENTIONS:  - Monitor labs and assess for signs and symptoms of volume excess or deficit  - Monitor I/O and WT  - Instruct patient on fluid and nutrition as appropriate   Outcome: Progressing    Goal: Glucose maintained within target range  INTERVENTIONS:  - Monitor Blood Glucose as ordered  - Assess for signs and symptoms of hyperglycemia and hypoglycemia  - Administer ordered medications to maintain glucose within target range  - Assess nutritional intake and initiate nutrition service referral as needed   Outcome: Progressing      Problem: SKIN/TISSUE INTEGRITY - ADULT  Goal: Skin integrity remains intact  INTERVENTIONS  - Identify patients at risk for skin breakdown  - Assess and monitor skin integrity  - Assess and monitor nutrition and hydration status  - Monitor labs (i e  albumin)  - Assess for incontinence   - Turn and reposition patient  - Assist with mobility/ambulation  - Relieve pressure over bony prominences  - Avoid friction and shearing  - Provide appropriate hygiene as needed including keeping skin clean and dry  - Evaluate need for skin moisturizer/barrier cream  - Collaborate with interdisciplinary team (i e  Nutrition, Rehabilitation, etc )   - Patient/family teaching   Outcome: Progressing    Goal: Incision(s), wounds(s) or drain site(s) healing without S/S of infection  INTERVENTIONS  - Assess and document risk factors for skin impairment   - Assess and document dressing, incision, wound bed, drain sites and surrounding tissue  - Initiate Nutrition services consult and/or wound management as needed   Outcome: Progressing    Goal: Oral mucous membranes remain intact  INTERVENTIONS  - Assess oral mucosa and hygiene practices  - Implement preventative oral hygiene regimen  - Implement oral medicated treatments as ordered  - Initiate Nutrition services referral as needed   Outcome: Progressing      Problem: HEMATOLOGIC - ADULT  Goal: Maintains hematologic stability  INTERVENTIONS  - Assess for signs and symptoms of bleeding or hemorrhage  - Monitor labs  - Administer supportive blood products/factors as ordered and appropriate   Outcome: Progressing      Problem: DISCHARGE PLANNING - CARE MANAGEMENT  Goal: Discharge to post-acute care or home with appropriate resources  INTERVENTIONS:  - Conduct assessment to determine patient/family and health care team treatment goals, and need for post-acute services based on payer coverage, community resources, and patient preferences, and barriers to discharge  - Address psychosocial, clinical, and financial barriers to discharge as identified in assessment in conjunction with the patient/family and health care team  - Arrange appropriate level of post-acute services according to patient's   needs and preference and payer coverage in collaboration with the physician and health care team  - Communicate with and update the patient/family, physician, and health care team regarding progress on the discharge plan  - Arrange appropriate transportation to post-acute venues   Outcome: Progressing

## 2018-12-04 NOTE — NURSING NOTE
Discharge instructions reviewed and sent with patient  Med prescriptions sent with patient  Patient discharged home  Taken out in a wheelchair by the PCA

## 2018-12-04 NOTE — DISCHARGE INSTRUCTIONS
Please refer to post ICD implantation discharge instructions and restrictions below and your ICD booklet/temporary card  Keep incision dry for one week  Do not use lotions/powders/creams on incision  Remove outer bandage 48 hours after implantation  Leave underlying steri-strips in place, they will either fall off on their own or will be removed at 2 week follow up appointment  No overhead reaching/pushing/pulling/lifting greater than 5-10lbs with left arm for one month  Please call the office if you notice redness, swelling, bleeding, or drainage from incision or if you develop fevers    Implantable Cardioverter Defibrillator      If you have any questions, please call [phone] to speak with a nurse (8:30am-4pm, or [phone] after hours)  For appointments, please call [phone]  WHAT YOU SHOULD KNOW:    An implantable cardioverter defibrillator (ICD) is a small device that monitors your heart rate and rhythm  It is commonly placed inside your upper chest region  It may be used if you have a ventricular arrhythmia, which is an irregular, dangerous rhythm from the bottom chamber of your heart  Some arrhythmias may cause your heart to suddenly stop beating  An ICD can give a shock to your heart to make it start beating again, or it can give pacing therapy (also known as pain-free therapy) to return your heart to normal rhythm  It is also a pacemaker, so it will pace your heart if needed to prevent it from beating too slowly            AFTER YOU LEAVE:      Follow up with your primary healthcare provider or cardiologist as directed: You will need to follow-up to have your ICD checked and make sure you are not having problems  Write down your questions so you remember to ask them during your visits  Self-care:   · Ask about activity: Ask if you need to avoid moving your shoulder or arm, and for how long  Ask if you should avoid lifting heavy objects   Do not play any contact sports, such as football or wrestling, until your primary healthcare provider [facility] or cardiologist tells you it is okay  You may only be able to drive for a certain amount of time per day, or during certain hours  Ask when you can return to work  · Care for your skin over the ICD: Ask your PHP or cardiologist when it is okay for you to bathe  Do not put any lotion or powder on the incision area  Do not rub or wear tight clothing over the ICD area until your PHP or cardiologist tells you it is okay  When you get a shock from your ICD: A shock may feel like someone has hit you, or you may feel a thump in the chest  If someone is touching you when you get a shock, they may feel a tingling feeling  The first time you feel a shock, it may scare you  Sit or lie down and stay calm  Ask someone to stay with you if possible  Please either call your cardiologist or report to an emergency room  Safety instructions when you have an ICD:   · Carry an ID card for the ICD: This card has important information about your ICD  · Stay away from magnets or machines with electric fields: This includes MRI machines  Avoid leaning into a car engine or doing welding  These things can interfere with how your ICD works  · Tell airport security you have an ICD: You may need to be searched by hand when you go through a security gate  The security gate or handheld wand could harm your ICD  · Keep an ICD diary: Record when you get a shock and what you were doing before you got the shock  Keep track of how you felt before and after the shock, as well as how many shocks you received  Write down the day and time of each shock  Bring the diary with you when you see your PHP or cardiologist    · Carry medical alert identification: Wear medical alert jewelry or carry a card that says you have an ICD  Ask your PHP or cardiologist where to get these items  Contact your primary healthcare provider or cardiologist if:   · You have a fever      · You feel 1 or more shocks from your ICD and feel fine afterwards  · Your feet or ankles swell  · The area around your ICD is painful or tender after surgery  · The skin around your stitches or staples is red, swollen, or draining pus or fluid  · You have chills, a cough, and feel weak or achy  · You are sad or anxious and find it hard to do your usual activities  · You have questions or concerns about your condition or care  Seek care immediately or call 911 if:   · Your stitches or staples come apart  · Blood soaks through your bandage  · You feel more than 3 shocks in a row from your ICD  · You become weak, dizzy, or faint  · You feel your heart skip beats or beat very fast or slow, but you do not feel a shock from your ICD  · You have chest pain that does not go away with rest or medicine  © 2014 [address] [employer] is for End User's use only and may not be sold, redistributed or otherwise used for commercial purposes  All illustrations and images included in CareNotes® are the copyrighted property of A D A M [employer] Inc  or Axel Sloares  The above information is an  only  It is not intended as medical advice for individual conditions or treatments  Talk to your doctor, nurse or pharmacist before following any medical regimen to see if it is safe and effective for you  Myocardial Infarction   WHAT YOU NEED TO KNOW:   A myocardial infarction (MI) is a heart attack  A heart attack happens when the blood vessels that supply blood to your heart (coronary arteries) are blocked  This can damage your heart  It can lead to an abnormal heart rhythm, heart failure, or may become life-threatening  DISCHARGE INSTRUCTIONS:   Medicines: You may  need any of the following:  · Heart medicines  help decrease blood pressure, control your heart rate, and help your heart function better       · Nitroglycerin  opens the arteries to your heart, increases oxygen levels, and can decrease chest pain  You may get your nitroglycerin as a pill, a patch, or a paste  Ask your healthcare provider or cardiologist how to safely take this medicine  · Aspirin  helps prevent clots from forming and causing blood flow problems  If healthcare providers want you to take aspirin daily, do not take acetaminophen or ibuprofen instead  Do not take more or less aspirin than healthcare providers say to take  If you are on another blood thinner medicine, ask your healthcare provider or cardiologist before you take aspirin for any reason  · Blood thinners    help prevent blood clots  Examples of blood thinners include heparin and warfarin  Clots can cause strokes, heart attacks, and death  The following are general safety guidelines to follow while you are taking a blood thinner:    ¨ Watch for bleeding and bruising while you take blood thinners  Watch for bleeding from your gums or nose  Watch for blood in your urine and bowel movements  Use a soft washcloth on your skin, and a soft toothbrush to brush your teeth  This can keep your skin and gums from bleeding  If you shave, use an electric shaver  Do not play contact sports  ¨ Tell your dentist and other healthcare providers that you take anticoagulants  Wear a bracelet or necklace that says you take this medicine  ¨ Do not start or stop any medicines unless your healthcare provider tells you to  Many medicines cannot be used with blood thinners  ¨ Tell your healthcare provider right away if you forget to take the medicine, or if you take too much  ¨ Warfarin  is a blood thinner that you may need to take  The following are things you should be aware of if you take warfarin  § Foods and medicines can affect the amount of warfarin in your blood  Do not make major changes to your diet while you take warfarin  Warfarin works best when you eat about the same amount of vitamin K every day   Vitamin K is found in green leafy vegetables and certain other foods  Ask for more information about what to eat when you are taking warfarin  § You will need to see your healthcare provider for follow-up visits when you are on warfarin  You will need regular blood tests  These tests are used to decide how much medicine you need  · Cholesterol medicine  decreases cholesterol and the amount of plaque in your blood  · Do not take certain medicines without asking your healthcare provider first   These include NSAIDs, herbal or vitamin supplements, or hormones (estrogen or progestin)  · Take your medicine as directed  Contact your healthcare provider if you think your medicine is not helping or if you have side effects  Tell him or her if you are allergic to any medicine  Keep a list of the medicines, vitamins, and herbs you take  Include the amounts, and when and why you take them  Bring the list or the pill bottles to follow-up visits  Carry your medicine list with you in case of an emergency  Cardiac rehabilitation (rehab)  is a program run by specialists who will help you safely strengthen your heart and prevent more heart disease  The plan includes exercise, relaxation, stress management, and heart-healthy nutrition  Healthcare providers will also check to make sure any medicines you take are working  The plan may also include instructions for when you can drive, return to work, and do other normal daily activities  Follow up with your healthcare provider or cardiologist within 14 days or as directed:  Ask for information about continuing care, treatments, and home services  Write down your questions so you remember to ask them during your visits  Lifestyle changes:   · Go to cardiac rehabilitation as directed  This is a program run by specialists who will help you safely strengthen your heart and prevent more heart disease  This plan includes exercise, relaxation, stress management, and heart-healthy nutrition   Healthcare providers will also check to make sure any medicines you are taking are working  The plan may also include instructions for when you can drive, return to work, and do other normal daily activities  · Eat a heart healthy diet  Get enough calories, protein, vitamins, and minerals to help prevent poor nutrition and promote muscle strength  You may be told to eat foods low in cholesterol or sodium (salt)  You also may be told to limit saturated and trans fats  Eat foods that contain healthy fats, such as walnuts, salmon, and canola and soybean oils  Eat foods that help protect the heart, including plenty of fruits and vegetables, nuts, and sources of fiber  · Do not smoke  If you smoke, it is never too late to quit  Smoking increases your risk of another MI      · Exercise  Ask your healthcare provider or cardiologist about the best exercise plan for you  Exercise makes your heart stronger, lowers blood pressure, and helps prevent an MI  The goal is 30 to 60 minutes a day, 5 to 7 days a week  Ask your healthcare provider or cardiologist how often and how long to exercise  · Maintain a healthy weight  Ask your healthcare provider or cardiologist how much you should weigh  Ask him to help you create a weight loss plan if you are overweight  · Manage your stress  Stress may slow healing and lead to illness  Learn ways to control stress, such as relaxation, deep breathing, and music  Talk to someone about things that upset you  · Get a flu vaccine  every year as soon as it is available  The vaccine will help prevent the flu  Ask about other vaccinations you may need  Contact your healthcare provider or cardiologist if:   · You have trouble taking your heart medicine  · You have questions or concerns about your condition or care    Seek care immediately or call 911 if:   · You have any of the following signs of a heart attack:      ¨ Squeezing, pressure, or pain in your chest that lasts longer than 5 minutes or returns    ¨ Discomfort or pain in your back, neck, jaw, stomach, or arm     ¨ Trouble breathing    ¨ Nausea or vomiting    ¨ Lightheadedness or a sudden cold sweat, especially with chest pain or trouble breathing    · You are tired and cannot think clearly  · Your heart is beating faster than usual      · You are bleeding from your gums or nose  · You see blood in your urine or bowel movements  · You urinate less than usual or not at all  · You have new or increased swelling in your feet or ankles  © 2017 [facility] Information is for End User's use only and may not be sold, redistributed or otherwise used for commercial purposes  All illustrations and images included in CareNotes® are the copyrighted property of [employer]  or Axel Solares  The above information is an  only  It is not intended as medical advice for individual conditions or treatments  Talk to your doctor, nurse or pharmacist before following any medical regimen to see if it is safe and effective for you

## 2018-12-04 NOTE — PROGRESS NOTES
SOD - Internal Medicine Progress Note  Unit/Bed#: [facility] 511-01 Encounter: [id number]  SOD Team A      Billy Zhang 66 y o  male [id number]  Hospital Stay Days: 2    Assessment and Plan      Current Problem List   Principal Problem:    Cardiac arrest [facility])  Active Problems:    Ventricular fibrillation (HCC)    Anxiety    STEMI (ST elevation myocardial infarction) ([facility] [address] )    CAD (coronary artery disease)    Type 2 diabetes mellitus, without long-term current use of insulin (HCC)    Assessment/Plan:    1  Cardiac arrest likely secondary to STEMI with non sustained V-Tach  ·  Patient received AICD yesterday for monomorphic V-Fib, will need staples removed in 2 weeks  · Catherization showed 100% stenosis of the LAD with S/P CRUZ  · Hemodynamically stable  · Will discuss with cardiology regarding disposition  · Follow CXR for AICD placement, cardiology read: stable  · EF post revasc  20-25%  · CHF post STEMI - Lasix 20 mg PO daily  2   STEMI  ·  Cont  Asprin  Lipitor  Areli Brine  Metoprolol  ACEI  3  Diabetes - New onset  ·  SSI  Blood glucose checks  · Stable blood sugars in the 140-180 range  4  NINI  ·  Buspar  5  Nicotine dependence  ·  Nicotine patch  6  Leukocytosis  ·  Stable, likely reactive  7  Urinary retention  ·  Will try voiding trial today  · Flomax  PT/OT recommendation: VNA  Disposition: Cont  In patient till cardiology's recs  Subjective     Patient had several 21 beats of V-Tach this morning, asymptomatic at time  Patient denies any chest pain, SOB, palpitations or any other associated signs or symptoms  Patient otherwise feels well  Has had lizarraga in since [facility] has not undergone voiding trial yet  Patient received AICD yesterday     Objective     Vitals: Temp (24hrs), Av °F (36 7 °C), Min:97 9 °F (36 6 °C), Max:98 2 °F (36 8 °C)  Current: Temperature: 98 1 °F (36 7 °C)  Patient Vitals for the past 24 hrs:   BP Temp Temp src Pulse Resp SpO2 Weight 12/04/18 0718 113/75 98 1 °F (36 7 °C) Oral 83 18 95 % -   12/04/18 0600 - - - - - - 89 4 kg (197 lb 1 5 oz)   12/04/18 0219 112/69 98 2 °F (36 8 °C) Oral 74 18 93 % -   12/03/18 2337 120/70 98 °F (36 7 °C) Oral 76 18 96 % -   12/03/18 2155 103/60 - - - - - -   12/03/18 1932 105/67 97 9 °F (36 6 °C) Oral 76 16 94 % -   12/03/18 1852 108/69 - - 83 - - -   12/03/18 1835 110/68 - - 73 - - -   12/03/18 1100 106/68 97 9 °F (36 6 °C) - 73 14 93 % -    Body mass index is 27 49 kg/m²  Physical Exam:  GENERAL: NAD  HEENT:  NC/AT, PERRL,   CARDIAC:  RRR, +S1/S2, no S3/S4 heard, no m/g/r  PULMONARY:  CTA B/L  ABDOMEN:  Soft, NT/ND,no rebound/guarding/rigidity  Extremities:  No edema  NEUROLOGIC: Grossly intact  SKIN:  No rashes or erythema noted on exposed skin     Psych: Normal affect    Invasive Devices     Peripheral Intravenous Line            Peripheral IV 12/03/18 Right Forearm 1 day    Peripheral IV 12/03/18 Antecubital less than 1 day          Drain            Urethral Catheter 16 Fr  6 days                    Labs:     Results from last 7 days  Lab Units 12/04/18  0530 12/03/18  0440 12/02/18  0509 11/30/18  0431 11/29/18  0539 11/28/18  0429   WBC Thousand/uL 10 99* 10 20* 12 70* 10 69* 9 84 11 71*   HEMOGLOBIN g/dL 13 1 12 5 12 5 11 8* 12 9 12 7   HEMATOCRIT % 40 2 38 1 37 4 34 8* 37 6 37 4   PLATELETS Thousands/uL 372 373 353 349 392* 319   NEUTROS PCT %  --   --   --  72 73 78*   MONOS PCT %  --   --   --  11 11 10        Results from last 7 days  Lab Units 12/04/18  0530 12/03/18  0440 12/02/18  0509 11/30/18  0431 11/29/18  0539 11/28/18  0429   SODIUM mmol/L 137 135* 137 139 139 143   POTASSIUM mmol/L 4 0 3 9 4 0 3 9 3 5 3 3*   CHLORIDE mmol/L 104 104 103 104 104 105   CO2 mmol/L 22 26 23 24 23 25   BUN mg/dL 15 13 13 22 13 11   CREATININE mg/dL 0 91 0 87 0 91 1 07 0 84 0 84   CALCIUM mg/dL 8 9 9 5 8 8 8 6 8 7 8 6   ALK PHOS U/L  --   --   --   --  78 69   ALT U/L  --   --   --   --  65 62   AST U/L  -- --   --   --  43 32   MAGNESIUM mg/dL 2 3  --  2 1 1 9 1 9 2 0   PHOSPHORUS mg/dL  --   --   --   --  3 0 2 8   INR   --   --   --   --   --  1 23*   EGFR ml/min/1 73sq m 94 96 94 77 98 98       Results from last 7 days  Lab Units 11/28/18  0429   INR  1 23*             No results found for: PHART, NXL9CMC, PO2ART, OAT5ELI, G6MBRTDO, BEART, SOURCE  No components found for: HIV1X2  No results found for: HAV, HEPAIGM, HEPBIGM, HEPBCAB, HBEAG, HEPCAB  No results found for: SPEP, UPEP   Lab Results   Component Value Date    HGBA1C 7 6 (H) 11/23/2018     No results found for: CHOL   Lab Results   Component Value Date    HDL 37 (L) 11/22/2018      Lab Results   Component Value Date    LDLCALC 81 11/22/2018      Lab Results   Component Value Date    TRIG 117 11/22/2018     No components found for: PROCAL      Micro:      Urinalysis:  No results found for: AMPHETUR, BDZUR, COCAINEUR, OPIATEUR, PCPUR, THCUR, ETOH, ACTMNPHEN, SALICYLATE     Results from last 7 days  Lab Units 11/28/18  0508   SPEC GRAV UA  1 015           Intake and Outputs:  I/O       12/02 0701 - 12/03 0700 12/03 0701 - 12/04 0700    P  O  480 0    Total Intake(mL/kg) 480 (5 3) 0 (0)    Urine (mL/kg/hr) 1325 1200 (0 9)    Total Output 1325 1200    Net -845 -1200              Nutrition:  Diet Cardiovascular; Cardiac  Radiology Results:   XR chest portable   Final Result by Brenton Garcia MD (12/03 9663)   Status post AICD/pacemaker insertion  No active pulmonary disease  Workstation performed: [id number]         XR chest portable   Final Result by Julian Coulter MD (12/03 9516)      Bibasilar subsegmental atelectasis              Workstation performed: [id number]         XR chest 2 views    (Results Pending)     Scheduled Medications:    aspirin 81 mg Daily   atorvastatin 40 mg Daily With Dinner   busPIRone 15 mg BID   enoxaparin 40 mg Daily   furosemide 20 mg Daily   insulin lispro 1-5 Units HS   insulin lispro 1-6 Units TID AC   lisinopril 10 mg Daily   melatonin 6 mg HS   metoprolol tartrate 25 mg Q12H [facility]   nicotine 14 mg Daily   tamsulosin 0 4 mg Daily With Dinner   ticagrelor 90 mg Q12H [facility]     PRN MEDS:    acetaminophen 975 mg Q6H PRN   oxyCODONE 2 5 mg Q4H PRN     Last 24 Hour Meds: :   Medication Administration - last 24 hours from 12/03/2018 1017 to 12/04/2018 1017       Date/Time Order Dose Route Action Action by     12/03/2018 2332 acetaminophen (TYLENOL) tablet 975 mg 975 mg Oral Given Crook Plump, RN     12/04/2018 0947 aspirin chewable tablet 81 mg 81 mg Oral Given [place] FRANTZ Otero     12/03/2018 1833 atorvastatin (LIPITOR) tablet 40 mg 40 mg Oral Given Luiz NetFRANTZ pedraza     12/04/2018 0947 busPIRone (BUSPAR) tablet 15 mg 15 mg [facility]     12/03/2018 1833 busPIRone (BUSPAR) tablet 15 mg 15 mg Oral Given Luiz NetFRANTZ pedraza     12/04/2018 0947 furosemide (LASIX) tablet 20 mg 20 mg [facility]     12/04/2018 0946 lisinopril (ZESTRIL) tablet 10 mg 10 mg Oral Given [place] FRANTZ Otero     12/03/2018 2147 melatonin tablet 6 mg 6 mg Oral Given Crook Plump, RN     12/04/2018 0947 metoprolol tartrate (LOPRESSOR) tablet 25 mg 25 mg [facility]     12/03/2018 2155 metoprolol tartrate (LOPRESSOR) tablet 25 mg 25 mg Oral Not Given Crook Plump, RN     12/04/2018 0947 nicotine (NICODERM CQ) 14 mg/24hr TD 24 hr patch 14 mg 14 mg Transdermal Medication Applied [place] Otero RN     12/03/2018 1833 tamsulosin (FLOMAX) capsule 0 4 mg 0 4 mg Oral Given Luiz NetFRANTZ pedraza     12/04/2018 0947 ticagrelor (BRILINTA) tablet 90 mg 90 mg Oral [facility] FRANTZ Otero     12/03/2018 2147 ticagrelor (BRILINTA) tablet 90 mg 90 mg Oral Given Gerard Driscoll RN     12/04/2018 0946 enoxaparin (LOVENOX) subcutaneous injection 40 mg 40 mg Subcutaneous Given [place] Otero RN     12/03/2018 2148 insulin lispro (HumaLOG) 100 units/mL subcutaneous injection 1-5 Units 1 Units Subcutaneous Given Gerard Driscoll RN 12/04/2018 0947 insulin lispro (HumaLOG) 100 units/mL subcutaneous injection 1-6 Units 1 Units Subcutaneous Given [place] Otero, RN     12/03/2018 1600 insulin lispro (HumaLOG) 100 units/mL subcutaneous injection 1-6 Units 1 Units Subcutaneous Not Given Luiz Blair RN     12/03/2018 1248 insulin lispro (HumaLOG) 100 units/mL subcutaneous injection 1-6 Units 1 Units Subcutaneous Not Given Luiz Blair, FRANTZ     12/03/2018 1745 vancomycin (VANCOCIN) 1,250 mg in sodium chloride 0 9 % 250 mL IVPB 0 mg/kg Intravenous Stopped Viry Hedrick RN     12/03/2018 1525 vancomycin (VANCOCIN) 1,250 mg in sodium chloride 0 9 % 250 mL IVPB 1,250 mg Intravenous [place] Jeffrey RN     12/03/2018 1345 vancomycin (VANCOCIN) 1,250 mg in sodium chloride 0 9 % 250 mL IVPB   Intravenous Canceled Entry Luiz Blair RN     12/03/2018 1731 fentanyl citrate (PF) 100 MCG/2ML 50 mcg Intravenous Given Viry Hedrick RN     12/03/2018 1714 fentanyl citrate (PF) 100 MCG/2ML 50 mcg Intravenous Given Viry Hedrick RN     12/03/2018 1619 fentanyl citrate (PF) 100 MCG/2ML 50 mcg Intravenous Given Viry Hedrick RN     12/03/2018 1610 fentanyl citrate (PF) 100 MCG/2ML 50 mcg Intravenous Given Viry Hedrick RN     12/03/2018 1740 midazolam (VERSED) injection 1 mg Intravenous Given Viry Hedrick RN     12/03/2018 1722 midazolam (VERSED) injection 1 mg Intravenous Given Viry Hedrick RN     12/03/2018 1633 midazolam (VERSED) injection 1 mg Intravenous Given Viry Hedrick RN     12/03/2018 1611 midazolam (VERSED) injection 1 mg Intravenous Given Viry Hedrick RN     12/03/2018 1735 lidocaine (XYLOCAINE) 1 % injection 10 mL Infiltration Given Viry Hedrick RN     12/03/2018 1636 lidocaine (XYLOCAINE) 1 % injection 20 mL Infiltration Given Viry Hedrick RN     12/03/2018 1726 lidocaine (XYLOCAINE) 2 % injection 30 mL Infiltration Given Viry Hedrick RN     12/03/2018 1729 iohexol (OMNIPAQUE) 350 MG/ML injection (SINGLE-DOSE) 10 mL Intravenous Given Gila Orellana RN     12/03/2018 1744 gentamicin (GARAMYCIN) 40 mg/mL injection 80 mg Irrigation Given Gila Orellana RN        VTE Pharmacologic Prophylaxis: Enoxaparin (Lovenox)  VTE Mechanical Prophylaxis: sequential compression device  Patricia Borges DO    PLEASE NOTE:  This encounter was completed utilizing the [employer] Direct Speech Voice Recognition Software  Grammatical errors, random word insertions, pronoun errors and incomplete sentences are occasional consequences of the system due to software limitations, ambient noise and hardware issues  These may be missed by proof reading prior to affixing electronic signature  Any questions or concerns about the content, text or information contained within the body of this dictation should be directly addressed to the physician for clarification  Please do not hesitate to call me directly if you have any any questions or concerns

## 2018-12-04 NOTE — PROGRESS NOTES
Cardiology Progress Note - Billy Zhang 66 y o  male MRN: [id number]    Unit/Bed#: [facility] 511-01 Encounter: [id number]      Assessment:  Principal Problem:    Cardiac arrest [facility])  Active Problems:    Ventricular fibrillation (HCC)    Anxiety    STEMI (ST elevation myocardial infarction) ([facility] )    CAD (coronary artery disease)    Type 2 diabetes mellitus, without long-term current use of insulin ([facility] 75 )      Plan  · He underwent EPS, which demonstrated inducible VT, and as a result had ICD placed  · CXR this morning - (my prelim read) - leads in place, no pneumothorax  · Wound check - dressing clean and dry, no erythema/swelling around site  · Device interrogation - normal function  · He had another 21 beat run of NSVT this morning  · On metoprolol 25 bid, increase and change to metoprolol succinate 100 mg at discharge    Follow up in 2 weeks for device/wound check and then outpatient with Dr Rodriguez    Subjective:    No significant events overnight  Review of Systems  No c/o chest pain, No c/o palpitations, No c/o shortness of breath, No c/o dizziness or light-headedness, No c/o abdominal pain, no c/o nausea/vomitting  All other systems negative    Telemetry Review: NSVT 21 beat run this morning    Objective:   Vitals: Blood pressure 113/75, pulse 83, temperature 98 1 °F (36 7 °C), temperature source Oral, resp  rate 18, height 5' 11" (1 803 m), weight 89 4 kg (197 lb 1 5 oz), SpO2 95 %  , Body mass index is 27 49 kg/m² , Orthostatic Blood Pressures      Most Recent Value   Blood Pressure  113/75 filed at 12/04/2018 0718   Patient Position - Orthostatic VS  Sitting filed at 12/03/2018 0458         Systolic (40DJI), WVU:166 , Min:103 , WCF:540     Diastolic (01ZAM), TMK:45, Min:60, Max:75    Wt Readings from Last 5 Encounters:   12/04/18 89 4 kg (197 lb 1 5 oz)   12/02/18 93 8 kg (206 lb 12 7 oz)   12/02/18 93 kg (205 lb 0 4 oz)     I/O       12/02 0701 - 12/03 0700 12/03 0701 - 12/04 0700 12/04 0701 - 12/05 0700    P  O  480 480 360    Total Intake(mL/kg) 480 (5 3) 480 (5 4) 360 (4)    Urine (mL/kg/hr) 1325 2000 (0 9) 0 (0)    Total Output 1325 2000 0    Net -845 -1520 +360                     Physical Exam    Constitutional:  Billy Zhang appears well, alert and oriented x 3, pleasant and cooperative  No acute distress   HEENT:    Normocephalic, atraumatic   Neck:  Supple, No JVD   Lungs:  Clear to auscultation bilaterally, respirations unlabored    Chest Wall:  Left upper chest wall - wound dressing in place at ICD generator site, no erythema or swelling at the site, mild tenderness+   Heart::  Regular rate, Regular rhythm, S1 and S2 normal, no murmur, rub or gallop    Abdomen:  Soft, non-tender, non-distended   Neurological:  Awake, alert, oriented x3   Non-focal exam    Extremities:  No pedal edema, No calf tenderness         Laboratory Results:        CBC with diff:     Results from last 7 days  Lab Units 12/04/18 0530 12/03/18 0440 12/02/18  0509 11/30/18  0431 11/29/18  0539 11/28/18  0429   WBC Thousand/uL 10 99* 10 20* 12 70* 10 69* 9 84 11 71*   HEMOGLOBIN g/dL 13 1 12 5 12 5 11 8* 12 9 12 7   HEMATOCRIT % 40 2 38 1 37 4 34 8* 37 6 37 4   MCV fL 91 91 90 88 87 88   PLATELETS Thousands/uL 372 373 353 349 392* 319   MCH pg 29 5 29 8 30 0 29 9 30 0 30 0   MCHC g/dL 32 6 32 8 33 4 33 9 34 3 34 0   RDW % 14 0 14 0 13 8 13 4 13 5 13 6   MPV fL 9 9 10 0 9 6 9 5 9 8 9 7   NRBC AUTO /100 WBCs  --   --   --  0 0 0         CMP:  Results from last 7 days  Lab Units 12/04/18  0530 12/03/18 0440 12/02/18  0509 11/30/18  0431 11/29/18  0539 11/28/18  0429   POTASSIUM mmol/L 4 0 3 9 4 0 3 9 3 5 3 3*   CHLORIDE mmol/L 104 104 103 104 104 105   CO2 mmol/L 22 26 23 24 23 25   BUN mg/dL 15 13 13 22 13 11   CREATININE mg/dL 0 91 0 87 0 91 1 07 0 84 0 84   CALCIUM mg/dL 8 9 9 5 8 8 8 6 8 7 8 6   AST U/L  --   --   --   --  43 32   ALT U/L  --   --   --   --  65 62   ALK PHOS U/L  --   --   --   --  78 69   EGFR ml/min/1 73sq m 94 96 94 77 98 98         BMP:  Results from last 7 days  Lab Units 12/04/18  0530 12/03/18  0440 12/02/18  0509 11/30/18  0431 11/29/18  0539 11/28/18  0429   POTASSIUM mmol/L 4 0 3 9 4 0 3 9 3 5 3 3*   CHLORIDE mmol/L 104 104 103 104 104 105   CO2 mmol/L 22 26 23 24 23 25   BUN mg/dL 15 13 13 22 13 11   CREATININE mg/dL 0 91 0 87 0 91 1 07 0 84 0 84   CALCIUM mg/dL 8 9 9 5 8 8 8 6 8 7 8 6       BNP: No results for input(s): BNP in the last 72 hours      Magnesium:   Results from last 7 days  Lab Units 12/04/18  0530 12/02/18  0509 11/30/18  0431 11/29/18  0539 11/28/18  0429   MAGNESIUM mg/dL 2 3 2 1 1 9 1 9 2 0       Coags:   Results from last 7 days  Lab Units 11/28/18  0429   INR  1 23*       TSH:        Hemoglobin A1C       Lipid Profile:       Meds/Allergies   all current active meds have been reviewed and current meds: Current Facility-Administered Medications   Medication Dose Route Frequency    acetaminophen (TYLENOL) tablet 975 mg  975 mg Oral Q6H PRN    aspirin chewable tablet 81 mg  81 mg Oral Daily    atorvastatin (LIPITOR) tablet 40 mg  40 mg Oral Daily With Dinner    busPIRone (BUSPAR) tablet 15 mg  15 mg Oral BID    enoxaparin (LOVENOX) subcutaneous injection 40 mg  40 mg Subcutaneous Daily    furosemide (LASIX) tablet 20 mg  20 mg Oral Daily    insulin lispro (HumaLOG) 100 units/mL subcutaneous injection 1-5 Units  1-5 Units Subcutaneous HS    insulin lispro (HumaLOG) 100 units/mL subcutaneous injection 1-6 Units  1-6 Units Subcutaneous TID AC    lisinopril (ZESTRIL) tablet 10 mg  10 mg Oral Daily    melatonin tablet 6 mg  6 mg Oral HS    metoprolol tartrate (LOPRESSOR) tablet 25 mg  25 mg Oral Q12H [address]    nicotine (NICODERM CQ) 14 mg/24hr TD 24 hr patch 14 mg  14 mg Transdermal Daily    oxyCODONE (ROXICODONE) IR tablet 2 5 mg  2 5 mg Oral Q4H PRN    tamsulosin (FLOMAX) capsule 0 4 mg  0 4 mg Oral Daily With Dinner    ticagrelor (BRILINTA) tablet 90 mg  90 mg Oral Q12H [address]     Prescriptions Prior to Admission   Medication    busPIRone (BUSPAR) 15 mg tablet            Cardiac testing:   Results for orders placed during the hospital encounter of 18   Echo complete with contrast if indicated    Narrative [address] [place] [id number] [phone]    Transthoracic Echocardiogram  2D, M-mode, Doppler, and Color Doppler    Study date:  2018    Patient: Arsenio Arrington  MR number: [id number]  Account number: [de-identified]  : 1960  Age: 66 years  Gender: Male  Status: Inpatient  Location: Bedside  Height: 72 in  Weight: 218 lb  BP: 107/ 73 mmHg    Indications: Evaluate suspected myocardial infarction  Diagnoses: I21 3 - ST elevation (STEMI) myocardial infarction of unspecified site    Sonographer:   [facility] [place]  Interpreting Physician:  Yessi Garzon MD  Referring Physician:  Montrell Ramirez PA-C    SUMMARY    LEFT VENTRICLE:  The ventricle was dilated  Systolic function was markedly reduced  Ejection fraction was estimated in the range of 20 % to 25 %  There was severe hypokinesis of the apical anterior, mid anteroseptal, and entire inferior wall(s)  There was akinesis of the mid inferoseptal and apical wall(s)  The wall was thinned at the apex and mid inferoseptum  MITRAL VALVE:  There was mild regurgitation  AORTIC VALVE:  There was mild regurgitation  TRICUSPID VALVE:  There was trace regurgitation  PULMONIC VALVE:  There was trace regurgitation  HISTORY: PRIOR HISTORY: Ventricular fibrillation, STEMI, CAD, STEVIE,    PROCEDURE: The procedure was performed at the bedside  This was a routine study  The transthoracic approach was used  The study included complete 2D imaging, M-mode, complete spectral Doppler, and color Doppler  The heart rate was 57 bpm,  at the start of the study  Images were obtained from the parasternal, apical, subcostal, and suprasternal notch acoustic windows  Intravenous contrast (  6mL of Definity in NSS) was administered to opacify the left ventricle  Image quality  was adequate  LEFT VENTRICLE: The ventricle was dilated  Systolic function was markedly reduced  Ejection fraction was estimated in the range of 20 % to 25 %  There was severe hypokinesis of the apical anterior, mid anteroseptal, and entire inferior  wall(s)  There was akinesis of the mid inferoseptal and apical wall(s)  The wall was thinned at the apex and mid inferoseptum  DOPPLER: Left ventricular diastolic function parameters were normal     RIGHT VENTRICLE: The size was normal  Systolic function was normal  Wall thickness was normal     RIGHT ATRIUM: Size was normal     MITRAL VALVE: Valve structure was normal  There was normal leaflet separation  DOPPLER: There was no evidence for stenosis  There was mild regurgitation  AORTIC VALVE: The valve was trileaflet  Leaflets exhibited normal thickness and normal cuspal separation  The valve was not well visualized  DOPPLER: Transaortic velocity was within the normal range  There was no evidence for stenosis  There was mild regurgitation  TRICUSPID VALVE: The valve structure was normal  There was normal leaflet separation  DOPPLER: There was no evidence for stenosis  There was trace regurgitation  PULMONIC VALVE: Leaflets exhibited normal thickness, no calcification, and normal cuspal separation  DOPPLER: The transpulmonic velocity was within the normal range  There was trace regurgitation  PERICARDIUM: There was no pericardial effusion  The pericardium was normal in appearance      SYSTEM MEASUREMENT TABLES    2D  %FS: 17 8 %  Ao Diam: 3 cm  EDV(Teich): 148 6 ml  EF(Teich): 36 6 %  ESV(Teich): 94 2 ml  IVSd: 1 5 cm  LA Area: 15 6 cm2  LA Diam: 2 9 cm  LVEDV MOD A4C: 196 6 ml  LVEF MOD A4C: 18 8 %  LVESV MOD A4C: 159 7 ml  LVIDd: 5 5 cm  LVIDs: 4 5 cm  LVLd A4C: 10 cm  LVLs A4C: 9 5 cm  LVPWd: 1 3 cm  RA Area: 17 6 cm2  RVIDd: 3 5 cm  SV MOD A4C: 36 9 ml  SV(Teich): 54 4 ml    CW  AR Dec Zapata: 1 9 m/s2  AR Dec Time: 2440 3 ms  AR PHT: 707 7 ms  AR Vmax: 4 6 m/s  AR maxP 4 mmHg  MR VTI: 154 7 cm  MR Vmax: 4 3 m/s  MR Vmean: 3 3 m/s  MR maxP 6 mmHg  MR meanP 4 mmHg  TR Vmax: 2 1 m/s  TR maxP 6 mmHg    MM  TAPSE: 1 5 cm    PW  E': 0 m/s  E/E': 15 6  MV A Arthur: 0 9 m/s  MV Dec Zapata: 3 9 m/s2  MV DecT: 195 2 ms  MV E Arthur: 0 8 m/s  MV E/A Ratio: 0 8  MV PHT: 56 6 ms  MVA By PHT: 3 9 cm2    [employer] Accredited Echocardiography Laboratory    Prepared and electronically signed by    Jayce Bateman MD  Signed [id number] 15:38:37       No results found for this or any previous visit  No results found for this or any previous visit  No results found for this or any previous visit  Counseling / Coordination of Care  Total floor / unit time spent today 25 minutes  Greater than 50% of total time was spent with the patient and / or family counseling and / or coordination of care  A description of the counseling / coordination of care: Obtained history, performed physical examination, discussed laboratory and imaging results, and explained further plan of care  Jc Gallegos

## 2018-12-12 ENCOUNTER — TELEPHONE (OUTPATIENT)
Dept: CASE MANAGEMENT | Facility: HOSPITAL | Age: 58
End: 2018-12-12

## 2018-12-13 ENCOUNTER — IN-CLINIC DEVICE VISIT (OUTPATIENT)
Dept: CARDIOLOGY CLINIC | Facility: CLINIC | Age: 58
End: 2018-12-13

## 2018-12-13 DIAGNOSIS — I46.9 CARDIAC ARREST (HCC): Primary | ICD-10-CM

## 2018-12-13 DIAGNOSIS — Z95.810 PRESENCE OF IMPLANTABLE CARDIOVERTER-DEFIBRILLATOR (ICD): ICD-10-CM

## 2018-12-13 DIAGNOSIS — I47.2 VT (VENTRICULAR TACHYCARDIA) (HCC): ICD-10-CM

## 2018-12-13 PROCEDURE — 99024 POSTOP FOLLOW-UP VISIT: CPT | Performed by: INTERNAL MEDICINE

## 2018-12-17 NOTE — PROGRESS NOTES
SJM DUAL CHAMBER ICD  DEVICE INTERROGATED IN THE [place] OFFICE:  BATTERY VOLTAGE ADEQUATE (7 7 YR)   AP 14%  <1%    ALL LEAD PARAMETERS WITHIN NORMAL LIMITS   NO SIGNIFICANT HIGH RATE EPISODES   NO PROGRAMMING CHANGES MADE TO DEVICE PARAMETERS   WOUND CHECK: INCISION CLEAN AND DRY WITH EDGES APPROXIMATED; WOUND CARE AND RESTRICTIONS REVIEWED WITH PATIENT   NORMAL DEVICE FUNCTION  Anthony Walker

## 2019-03-28 ENCOUNTER — IN-CLINIC DEVICE VISIT (OUTPATIENT)
Dept: CARDIOLOGY CLINIC | Facility: CLINIC | Age: 59
End: 2019-03-28
Payer: COMMERCIAL

## 2019-03-28 DIAGNOSIS — I47.2 VT (VENTRICULAR TACHYCARDIA) (HCC): Primary | ICD-10-CM

## 2019-03-28 DIAGNOSIS — Z95.810 PRESENCE OF IMPLANTABLE CARDIOVERTER-DEFIBRILLATOR (ICD): ICD-10-CM

## 2019-03-28 PROCEDURE — 93283 PRGRMG EVAL IMPLANTABLE DFB: CPT | Performed by: INTERNAL MEDICINE

## 2019-03-28 NOTE — PROGRESS NOTES
SJM DUAL CHAMBER ICD  DEVICE INTERROGATED IN THE [place] OFFICE:  BATTERY VOLTAGE ADEQUATE (7 2 YR)   AP 27%  <1%   ALL LEAD PARAMETERS WITHIN NORMAL LIMITS   NO SIGNIFICANT HIGH RATE EPISODES    DECREASE MADE TO RA AMPLITUDE TO PROMOTE DEVICE LONGEVITY WHILE MAINTAINING AN APPROPRIATE SAFETY MARGIN   CORVUE IMPEDANCE MONITORING SET TO "ON "  NORMAL DEVICE FUNCTION  Anthony Walker

## 2019-07-31 ENCOUNTER — REMOTE DEVICE CLINIC VISIT (OUTPATIENT)
Dept: CARDIOLOGY CLINIC | Facility: CLINIC | Age: 59
End: 2019-07-31
Payer: COMMERCIAL

## 2019-07-31 DIAGNOSIS — Z95.810 PRESENCE OF IMPLANTABLE CARDIOVERTER-DEFIBRILLATOR (ICD): Primary | ICD-10-CM

## 2019-07-31 PROCEDURE — 93296 REM INTERROG EVL PM/IDS: CPT | Performed by: INTERNAL MEDICINE

## 2019-07-31 PROCEDURE — 93295 DEV INTERROG REMOTE 1/2/MLT: CPT | Performed by: INTERNAL MEDICINE

## 2019-07-31 NOTE — PROGRESS NOTES
Results for orders placed or performed in visit on 07/31/19   Cardiac EP device report    Narrative    [facility] DUAL CHAMBER ICD  MERLIN TRANSMISSION: BATTERY VOLTAGE ADEQUATE (7 2-8 2 YRS)  AP - 15%  - 1%  ALL AVAILABLE LEAD PARAMETERS WITHIN NORMAL LIMITS  5 AMS EPISODES, ALL DURATIONS 6-8 SECS  W/EGRMS SHOWING FF O/S ON ATRIAL CHANNEL (NO AF)  PT ON ASA 81, METOPROLOL SUCC  NO OTHER SIGNIFICANT HIGH RATE EPISODES  CORVUE IMPEDANCE MONITORING WITHIN NORMAL LIMITS  APPROPRIATELY FUNCTIONING ICD      EB

## 2019-11-11 ENCOUNTER — TELEPHONE (OUTPATIENT)
Dept: SURGERY | Facility: HOSPITAL | Age: 59
End: 2019-11-11

## 2019-11-11 ENCOUNTER — TELEPHONE (OUTPATIENT)
Dept: NON INVASIVE DIAGNOSTICS | Facility: HOSPITAL | Age: 59
End: 2019-11-11

## 2019-11-11 DIAGNOSIS — I25.10 CORONARY ARTERY DISEASE INVOLVING NATIVE CORONARY ARTERY OF NATIVE HEART WITHOUT ANGINA PECTORIS: Primary | ICD-10-CM

## 2019-11-11 RX ORDER — SODIUM CHLORIDE 9 MG/ML
50 INJECTION, SOLUTION INTRAVENOUS CONTINUOUS
Status: CANCELLED | OUTPATIENT
Start: 2019-11-11

## 2019-11-12 ENCOUNTER — PREP FOR PROCEDURE (OUTPATIENT)
Dept: NON INVASIVE DIAGNOSTICS | Facility: HOSPITAL | Age: 59
End: 2019-11-12

## 2019-11-12 ENCOUNTER — HOSPITAL ENCOUNTER (OUTPATIENT)
Dept: INTERVENTIONAL RADIOLOGY/VASCULAR | Facility: HOSPITAL | Age: 59
Discharge: HOME | End: 2019-11-12
Attending: INTERNAL MEDICINE | Admitting: INTERNAL MEDICINE
Payer: COMMERCIAL

## 2019-11-12 VITALS
TEMPERATURE: 97.9 F | HEART RATE: 65 BPM | DIASTOLIC BLOOD PRESSURE: 72 MMHG | WEIGHT: 233.69 LBS | RESPIRATION RATE: 17 BRPM | BODY MASS INDEX: 32.72 KG/M2 | HEIGHT: 71 IN | SYSTOLIC BLOOD PRESSURE: 117 MMHG | OXYGEN SATURATION: 95 %

## 2019-11-12 DIAGNOSIS — I25.10 CORONARY ARTERY DISEASE INVOLVING NATIVE CORONARY ARTERY OF NATIVE HEART WITHOUT ANGINA PECTORIS: ICD-10-CM

## 2019-11-12 LAB
ANION GAP SERPL CALCULATED.3IONS-SCNC: 7 MMOL/L (ref 4–13)
BASOPHILS # BLD AUTO: 0.01 THOUSANDS/ΜL (ref 0–0.1)
BASOPHILS NFR BLD AUTO: 0 % (ref 0–1)
BUN SERPL-MCNC: 13 MG/DL (ref 5–25)
CALCIUM SERPL-MCNC: 9.1 MG/DL (ref 8.3–10.1)
CHLORIDE SERPL-SCNC: 104 MMOL/L (ref 100–108)
CO2 SERPL-SCNC: 28 MMOL/L (ref 21–32)
CREAT SERPL-MCNC: 0.93 MG/DL (ref 0.6–1.3)
EOSINOPHIL # BLD AUTO: 0.15 THOUSAND/ΜL (ref 0–0.61)
EOSINOPHIL NFR BLD AUTO: 2 % (ref 0–6)
ERYTHROCYTE [DISTWIDTH] IN BLOOD BY AUTOMATED COUNT: 13.2 % (ref 11.6–15.1)
GFR SERPL CREATININE-BSD FRML MDRD: 91 ML/MIN/1.73SQ M
GLUCOSE P FAST SERPL-MCNC: 248 MG/DL (ref 65–99)
GLUCOSE SERPL-MCNC: 225 MG/DL (ref 65–140)
GLUCOSE SERPL-MCNC: 248 MG/DL (ref 65–140)
HCT VFR BLD AUTO: 39.4 % (ref 36.5–49.3)
HGB BLD-MCNC: 12.9 G/DL (ref 12–17)
IMM GRANULOCYTES # BLD AUTO: 0.02 THOUSAND/UL (ref 0–0.2)
IMM GRANULOCYTES NFR BLD AUTO: 0 % (ref 0–2)
INR PPP: 1 (ref 0.84–1.19)
LYMPHOCYTES # BLD AUTO: 1.72 THOUSANDS/ΜL (ref 0.6–4.47)
LYMPHOCYTES NFR BLD AUTO: 26 % (ref 14–44)
MCH RBC QN AUTO: 28.8 PG (ref 26.8–34.3)
MCHC RBC AUTO-ENTMCNC: 32.7 G/DL (ref 31.4–37.4)
MCV RBC AUTO: 88 FL (ref 82–98)
MONOCYTES # BLD AUTO: 0.64 THOUSAND/ΜL (ref 0.17–1.22)
MONOCYTES NFR BLD AUTO: 10 % (ref 4–12)
NEUTROPHILS # BLD AUTO: 4.05 THOUSANDS/ΜL (ref 1.85–7.62)
NEUTS SEG NFR BLD AUTO: 62 % (ref 43–75)
NRBC BLD AUTO-RTO: 0 /100 WBCS
PLATELET # BLD AUTO: 249 THOUSANDS/UL (ref 149–390)
PMV BLD AUTO: 9.7 FL (ref 8.9–12.7)
POTASSIUM SERPL-SCNC: 4.1 MMOL/L (ref 3.5–5.3)
PROTHROMBIN TIME: 13.2 SECONDS (ref 11.6–14.5)
RBC # BLD AUTO: 4.48 MILLION/UL (ref 3.88–5.62)
SODIUM SERPL-SCNC: 139 MMOL/L (ref 136–145)
WBC # BLD AUTO: 6.59 THOUSAND/UL (ref 4.31–10.16)

## 2019-11-12 PROCEDURE — C1769 GUIDE WIRE: HCPCS | Performed by: INTERNAL MEDICINE

## 2019-11-12 PROCEDURE — 82948 REAGENT STRIP/BLOOD GLUCOSE: CPT

## 2019-11-12 PROCEDURE — 99152 MOD SED SAME PHYS/QHP 5/>YRS: CPT | Performed by: INTERNAL MEDICINE

## 2019-11-12 PROCEDURE — 93458 L HRT ARTERY/VENTRICLE ANGIO: CPT | Performed by: INTERNAL MEDICINE

## 2019-11-12 PROCEDURE — 85610 PROTHROMBIN TIME: CPT | Performed by: INTERNAL MEDICINE

## 2019-11-12 PROCEDURE — C1894 INTRO/SHEATH, NON-LASER: HCPCS | Performed by: INTERNAL MEDICINE

## 2019-11-12 PROCEDURE — 80048 BASIC METABOLIC PNL TOTAL CA: CPT | Performed by: INTERNAL MEDICINE

## 2019-11-12 PROCEDURE — 85025 COMPLETE CBC W/AUTO DIFF WBC: CPT | Performed by: INTERNAL MEDICINE

## 2019-11-12 RX ORDER — SODIUM CHLORIDE 9 MG/ML
75 INJECTION, SOLUTION INTRAVENOUS CONTINUOUS
Status: DISPENSED | OUTPATIENT
Start: 2019-11-12 | End: 2019-11-12

## 2019-11-12 RX ORDER — MIDAZOLAM HYDROCHLORIDE 2 MG/2ML
INJECTION, SOLUTION INTRAMUSCULAR; INTRAVENOUS CODE/TRAUMA/SEDATION MEDICATION
Status: COMPLETED | OUTPATIENT
Start: 2019-11-12 | End: 2019-11-12

## 2019-11-12 RX ORDER — NITROGLYCERIN 20 MG/100ML
INJECTION INTRAVENOUS CODE/TRAUMA/SEDATION MEDICATION
Status: COMPLETED | OUTPATIENT
Start: 2019-11-12 | End: 2019-11-12

## 2019-11-12 RX ORDER — CLOPIDOGREL BISULFATE 75 MG/1
75 TABLET ORAL DAILY
COMMUNITY

## 2019-11-12 RX ORDER — SODIUM CHLORIDE 9 MG/ML
50 INJECTION, SOLUTION INTRAVENOUS CONTINUOUS
Status: DISCONTINUED | OUTPATIENT
Start: 2019-11-12 | End: 2019-11-16 | Stop reason: HOSPADM

## 2019-11-12 RX ORDER — LIDOCAINE HYDROCHLORIDE 20 MG/ML
INJECTION, SOLUTION INFILTRATION; PERINEURAL CODE/TRAUMA/SEDATION MEDICATION
Status: COMPLETED | OUTPATIENT
Start: 2019-11-12 | End: 2019-11-12

## 2019-11-12 RX ORDER — VERAPAMIL HCL 2.5 MG/ML
AMPUL (ML) INTRAVENOUS CODE/TRAUMA/SEDATION MEDICATION
Status: COMPLETED | OUTPATIENT
Start: 2019-11-12 | End: 2019-11-12

## 2019-11-12 RX ORDER — DIGOXIN 125 MCG
125 TABLET ORAL DAILY
COMMUNITY

## 2019-11-12 RX ORDER — HEPARIN SODIUM 1000 [USP'U]/ML
INJECTION, SOLUTION INTRAVENOUS; SUBCUTANEOUS CODE/TRAUMA/SEDATION MEDICATION
Status: COMPLETED | OUTPATIENT
Start: 2019-11-12 | End: 2019-11-12

## 2019-11-12 RX ORDER — FENTANYL CITRATE 50 UG/ML
INJECTION, SOLUTION INTRAMUSCULAR; INTRAVENOUS CODE/TRAUMA/SEDATION MEDICATION
Status: COMPLETED | OUTPATIENT
Start: 2019-11-12 | End: 2019-11-12

## 2019-11-12 RX ORDER — PAROXETINE 10 MG/1
10 TABLET, FILM COATED ORAL DAILY
COMMUNITY

## 2019-11-12 RX ORDER — POTASSIUM CHLORIDE 750 MG/1
10 TABLET, FILM COATED, EXTENDED RELEASE ORAL 2 TIMES DAILY
COMMUNITY

## 2019-11-12 RX ADMIN — VERAPAMIL HYDROCHLORIDE 2.5 MG: 2.5 INJECTION, SOLUTION INTRAVENOUS at 08:04

## 2019-11-12 RX ADMIN — MIDAZOLAM HYDROCHLORIDE 1 MG: 1 INJECTION, SOLUTION INTRAMUSCULAR; INTRAVENOUS at 08:02

## 2019-11-12 RX ADMIN — NITROGLYCERIN 200 MCG: 20 INJECTION INTRAVENOUS at 08:04

## 2019-11-12 RX ADMIN — IODIXANOL 35 ML: 320 INJECTION, SOLUTION INTRAVASCULAR at 08:19

## 2019-11-12 RX ADMIN — HEPARIN SODIUM 3000 UNITS: 1000 INJECTION INTRAVENOUS; SUBCUTANEOUS at 08:09

## 2019-11-12 RX ADMIN — FENTANYL CITRATE 25 MCG: 50 INJECTION, SOLUTION INTRAMUSCULAR; INTRAVENOUS at 08:03

## 2019-11-12 RX ADMIN — LIDOCAINE HYDROCHLORIDE 1 ML: 20 INJECTION, SOLUTION INFILTRATION; PERINEURAL at 08:03

## 2019-11-12 NOTE — DISCHARGE INSTRUCTIONS
Heart Catheterization   WHAT YOU NEED TO KNOW:   A heart catheterization is a procedure to look at your heart and its blood vessels  Healthcare providers can measure oxygen levels and pressures in your heart  They can also fix problems with your valves, blood vessels, or the walls of your heart  You may need this procedure if you have chest pain, heart disease, or your heart is not working properly  DISCHARGE INSTRUCTIONS:   Call 911 for any of the following:   · You have any of the following signs of a heart attack:      ¨ Squeezing, pressure, or pain in your chest that lasts longer than 5 minutes or returns    ¨ Discomfort or pain in your back, neck, jaw, stomach, or arm     ¨ Trouble breathing    ¨ Nausea or vomiting    ¨ Lightheadedness or a sudden cold sweat, especially with chest pain or trouble breathing    · You have any of the following signs of a stroke:      ¨ Numbness or drooping on one side of your face     ¨ Weakness in an arm or leg    ¨ Confusion or difficulty speaking    ¨ Dizziness, a severe headache, or vision loss    · You feel lightheaded, short of breath, and have chest pain  · You cough up blood  · You have trouble breathing  · You cannot stop the bleeding from your wound even after you hold firm pressure for 10 minutes  Seek care immediately if:   · Blood soaks through your bandage  · Your stitches come apart  · Your arm or leg feels numb, cool, or looks pale  · Your wound gets swollen quickly  Contact your healthcare provider if:   · You have a fever or chills  · Your wound is red, swollen, or draining pus  · Your wound looks more bruised or there is new bruising on the side of your leg or arm  · You have nausea or are vomiting  · Your skin is itchy, swollen, or you have a rash  · You have questions or concerns about your condition or care  Medicines: You may need any of the following:  · Blood thinners  help prevent blood clots   You may need to take blood thinners after your procedure if your healthcare provider finds problems in your heart or blood vessels  You may also need them if he replaces one of your heart valves  Examples of blood thinners include heparin and warfarin  Clots can cause strokes, heart attacks, and death  The following are general safety guidelines to follow while you are taking a blood thinner:    ¨ Watch for bleeding and bruising while you take blood thinners  Watch for bleeding from your gums or nose  Watch for blood in your urine and bowel movements  Use a soft washcloth on your skin, and a soft toothbrush to brush your teeth  This can keep your skin and gums from bleeding  If you shave, use an electric shaver  Do not play contact sports  ¨ Tell your dentist and other healthcare providers that you take anticoagulants  Wear a bracelet or necklace that says you take this medicine  ¨ Do not start or stop any medicines unless your healthcare provider tells you to  Many medicines cannot be used with blood thinners  ¨ Tell your healthcare provider right away if you forget to take the medicine, or if you take too much  ¨ Warfarin  is a blood thinner that you may need to take  The following are things you should be aware of if you take warfarin  § Foods and medicines can affect the amount of warfarin in your blood  Do not make major changes to your diet while you take warfarin  Warfarin works best when you eat about the same amount of vitamin K every day  Vitamin K is found in green leafy vegetables and certain other foods  Ask for more information about what to eat when you are taking warfarin  § You will need to see your healthcare provider for follow-up visits when you are on warfarin  You will need regular blood tests  These tests are used to decide how much medicine you need  · Acetaminophen  helps decrease your pain and fever  This medicine is available without a doctor's order   Ask how much medicine is safe to take, and how often to take it  Acetaminophen can cause liver damage if not taken correctly  · Take your medicine as directed  Contact your healthcare provider if you think your medicine is not helping or if you have side effects  Tell him or her if you are allergic to any medicine  Keep a list of the medicines, vitamins, and herbs you take  Include the amounts, and when and why you take them  Bring the list or the pill bottles to follow-up visits  Carry your medicine list with you in case of an emergency  Bathing: You may be able to shower the day after your procedure  Remove your pressure bandage before you shower  Do not take baths or go in hot tubs or pools  Carefully wash the wound with soap and water  Pat the area dry  Care for your wound as directed:  Change your bandage when it gets wet or dirty  A small band-aid can be placed on your wound after you remove the pressure bandage  Monitor your wound everyday for signs of infection such as redness, swelling, or pus  Mild bruising is normal and expected  Do not put powders, lotions, or creams on your wound  Self-care:   · Apply firm , steady pressure if bleeding occurs  A small amount of bleeding from your wound is possible  Apply pressure with a clean gauze or towel for 5 to 10 minutes  Call 911 if bleeding becomes heavy or does not stop  · Do not lift anything heavier than 5 pounds  until directed by your healthcare provider  Heavy lifting can put stress on your wound and cause bleeding  Do not push or pull with the arm that was used for the procedure  · Do not do vigorous activity for at least 48 hours  Vigorous activity may cause bleeding from your wound  Rest and do quiet activities  Short walks to the bathroom and around the house are okay  Ask your healthcare provider when you can return to your normal activities  · Limit stair climbing  to prevent bleeding from your wound   Plan activities on one floor and use stairs 2 times a day or less  · Drink liquids  to flush the contrast liquid from your body and prevent blood clots  Ask how much liquid to drink each day and which liquids are best for you  · Restart your blood thinners as directed  Your healthcare provider may tell you to start taking your blood thinners after your procedure or he may have you wait a few days  Follow up with your healthcare provider as directed:  Write down your questions so you remember to ask them during your visits  © 2017 [employer] [employer] Information is for End User's use only and may not be sold, redistributed or otherwise used for commercial purposes  All illustrations and images included in CareNotes® are the copyrighted property of A [employer] M , Inc  or [employer]  The above information is an  only  It is not intended as medical advice for individual conditions or treatments  Talk to your doctor, nurse or pharmacist before following any medical regimen to see if it is safe and effective for you

## 2019-11-12 NOTE — PROGRESS NOTES
H&P Exam - Cardiology   Billy Zhang 64 y o  male MRN: [id number]  Unit/Bed#:  Encounter: [id number]    Assessment/Plan     Assessment:  Acute Systolic CHF  Plan:  Cardiac cath    History of Present Illness   HPI:  El  is a 64 y o  male who presents with dyspnea  Review of Systems   Respiratory: Positive for shortness of breath  Cardiovascular: Positive for leg swelling  Negative for chest pain and palpitations  Historical Information   Past Medical History:   Diagnosis Date    Arthritis     Cardiac arrest ([facility] ) 2018    Coronary artery disease     Diabetes ([facility] )     Hyperlipidemia     Hypertension     Renal disorder      Past Surgical History:   Procedure Laterality Date    CARDIAC CATHETERIZATION      CARDIAC DEFIBRILLATOR PLACEMENT       Social History   Social History     Substance and Sexual Activity   Alcohol Use No     Social History     Substance and Sexual Activity   Drug Use No     Social History     Tobacco Use   Smoking Status Former Smoker    Packs/day: 0 25    Years: 30 00    Pack years: 7 50    Types: Cigarettes    Last attempt to quit: 2018    Years since quittin 9   Smokeless Tobacco Never Used     Family History: No family history on file  Meds/Allergies   PTA meds:   Cannot display prior to admission medications because the patient has not been admitted in this contact  Allergies   Allergen Reactions    Barley Grass Throat Swelling       Objective   Vitals: There were no vitals taken for this visit  [unfilled]    Invasive Devices     Peripheral Intravenous Line            Peripheral IV 19 Left Hand less than 1 day                Physical Exam   Constitutional: He appears well-developed and well-nourished  No distress  Cardiovascular: Normal rate, regular rhythm, normal heart sounds and intact distal pulses  Exam reveals no gallop and no friction rub  No murmur heard    Pulmonary/Chest: Effort normal and breath sounds normal  No respiratory distress  Lab Results:   CBC with diff:   Results from last 7 days   Lab Units 11/12/19  0710   WBC Thousand/uL 6 59   RBC Million/uL 4 48   HEMOGLOBIN g/dL 12 9   HEMATOCRIT % 39 4   MCV fL 88   MCH pg 28 8   MCHC g/dL 32 7   RDW % 13 2   MPV fL 9 7   PLATELETS Thousands/uL 249     CMP:   Results from last 7 days   Lab Units 11/12/19  0710   SODIUM mmol/L 139   POTASSIUM mmol/L 4 1   CHLORIDE mmol/L 104   CO2 mmol/L 28   BUN mg/dL 13   CREATININE mg/dL 0 93   CALCIUM mg/dL 9 1   EGFR ml/min/1 73sq m 91     Troponin:   0   Lab Value Date/Time    TROPONINI 27 92 (H) 11/23/2018 0955    TROPONINI 34 56 (H) 11/23/2018 0449    TROPONINI 34 45 (H) 11/23/2018 0018    TROPONINI 33 74 (H) 11/22/2018 2019    TROPONINI 10 63 (H) 11/22/2018 1609    TROPONINI 5 20 (H) 11/22/2018 1514    TROPONINI 1 11 (H) 11/22/2018 1240     BNP:   Results from last 7 days   Lab Units 11/12/19  0710   POTASSIUM mmol/L 4 1   CHLORIDE mmol/L 104   CO2 mmol/L 28   BUN mg/dL 13   CREATININE mg/dL 0 93   CALCIUM mg/dL 9 1   EGFR ml/min/1 73sq m 91     Coags:   Results from last 7 days   Lab Units 11/12/19  0710   INR  1 00     TSH:     Magnesium:     Lipid Profile:     Imaging: I have personally reviewed pertinent reports  EKG:     Pathology and Other Studies:   VTE Prophylaxis:     Code Status: [unfilled]  Advance Directive and Living Will:      Power of :    POLST:      Counseling / Coordination of Care  Total floor / unit time spent today 60 minutes  Greater than 50% of total time was spent with the patient and / or family counseling and / or coordination of care  A description of the counseling / coordination of care: 61

## 2024-09-30 ENCOUNTER — HOSPITAL ENCOUNTER (INPATIENT)
Facility: HOSPITAL | Age: 64
LOS: 2 days | Discharge: HOME | DRG: 291 | End: 2024-10-02
Attending: EMERGENCY MEDICINE | Admitting: ANESTHESIOLOGY
Payer: COMMERCIAL

## 2024-09-30 ENCOUNTER — APPOINTMENT (EMERGENCY)
Dept: RADIOLOGY | Facility: HOSPITAL | Age: 64
DRG: 291 | End: 2024-09-30
Payer: COMMERCIAL

## 2024-09-30 DIAGNOSIS — I48.92 ATRIAL FIB/FLUTTER, TRANSIENT (HCC): ICD-10-CM

## 2024-09-30 DIAGNOSIS — I47.20 V-TACH (HCC): Primary | ICD-10-CM

## 2024-09-30 DIAGNOSIS — I48.91 ATRIAL FIB/FLUTTER, TRANSIENT (HCC): ICD-10-CM

## 2024-09-30 LAB
2HR DELTA HS TROPONIN: 12 NG/L
4HR DELTA HS TROPONIN: 11 NG/L
ALBUMIN SERPL BCG-MCNC: 3.1 G/DL (ref 3.5–5)
ALP SERPL-CCNC: 57 U/L (ref 34–104)
ALT SERPL W P-5'-P-CCNC: 58 U/L (ref 7–52)
ANION GAP SERPL CALCULATED.3IONS-SCNC: 7 MMOL/L (ref 4–13)
AST SERPL W P-5'-P-CCNC: 41 U/L (ref 13–39)
BASOPHILS # BLD AUTO: 0.05 THOUSANDS/ÂΜL (ref 0–0.1)
BASOPHILS NFR BLD AUTO: 1 % (ref 0–1)
BILIRUB SERPL-MCNC: 0.42 MG/DL (ref 0.2–1)
BNP SERPL-MCNC: 1107 PG/ML (ref 0–100)
BUN SERPL-MCNC: 19 MG/DL (ref 5–25)
CALCIUM ALBUM COR SERPL-MCNC: 7.4 MG/DL (ref 8.3–10.1)
CALCIUM SERPL-MCNC: 6.7 MG/DL (ref 8.4–10.2)
CARDIAC TROPONIN I PNL SERPL HS: 28 NG/L
CARDIAC TROPONIN I PNL SERPL HS: 39 NG/L
CARDIAC TROPONIN I PNL SERPL HS: 40 NG/L
CHLORIDE SERPL-SCNC: 112 MMOL/L (ref 96–108)
CO2 SERPL-SCNC: 19 MMOL/L (ref 21–32)
CREAT SERPL-MCNC: 1.03 MG/DL (ref 0.6–1.3)
EOSINOPHIL # BLD AUTO: 0.14 THOUSAND/ÂΜL (ref 0–0.61)
EOSINOPHIL NFR BLD AUTO: 1 % (ref 0–6)
ERYTHROCYTE [DISTWIDTH] IN BLOOD BY AUTOMATED COUNT: 15.4 % (ref 11.6–15.1)
GFR SERPL CREATININE-BSD FRML MDRD: 77 ML/MIN/1.73SQ M
GLUCOSE SERPL-MCNC: 139 MG/DL (ref 65–140)
HCT VFR BLD AUTO: 40.7 % (ref 36.5–49.3)
HGB BLD-MCNC: 12.9 G/DL (ref 12–17)
IMM GRANULOCYTES # BLD AUTO: 0.06 THOUSAND/UL (ref 0–0.2)
IMM GRANULOCYTES NFR BLD AUTO: 1 % (ref 0–2)
LYMPHOCYTES # BLD AUTO: 1.22 THOUSANDS/ÂΜL (ref 0.6–4.47)
LYMPHOCYTES NFR BLD AUTO: 12 % (ref 14–44)
MAGNESIUM SERPL-MCNC: 2.3 MG/DL (ref 1.9–2.7)
MCH RBC QN AUTO: 27.6 PG (ref 26.8–34.3)
MCHC RBC AUTO-ENTMCNC: 31.7 G/DL (ref 31.4–37.4)
MCV RBC AUTO: 87 FL (ref 82–98)
MONOCYTES # BLD AUTO: 1.1 THOUSAND/ÂΜL (ref 0.17–1.22)
MONOCYTES NFR BLD AUTO: 11 % (ref 4–12)
NEUTROPHILS # BLD AUTO: 7.37 THOUSANDS/ÂΜL (ref 1.85–7.62)
NEUTS SEG NFR BLD AUTO: 74 % (ref 43–75)
NRBC BLD AUTO-RTO: 0 /100 WBCS
PLATELET # BLD AUTO: 356 THOUSANDS/UL (ref 149–390)
PMV BLD AUTO: 10.1 FL (ref 8.9–12.7)
POTASSIUM SERPL-SCNC: 3.4 MMOL/L (ref 3.5–5.3)
PROT SERPL-MCNC: 6.1 G/DL (ref 6.4–8.4)
RBC # BLD AUTO: 4.68 MILLION/UL (ref 3.88–5.62)
SODIUM SERPL-SCNC: 138 MMOL/L (ref 135–147)
T4 FREE SERPL-MCNC: 1.09 NG/DL (ref 0.61–1.12)
TSH SERPL DL<=0.05 MIU/L-ACNC: 10.05 UIU/ML (ref 0.45–4.5)
WBC # BLD AUTO: 9.94 THOUSAND/UL (ref 4.31–10.16)

## 2024-09-30 PROCEDURE — 96375 TX/PRO/DX INJ NEW DRUG ADDON: CPT

## 2024-09-30 PROCEDURE — 96361 HYDRATE IV INFUSION ADD-ON: CPT

## 2024-09-30 PROCEDURE — 93005 ELECTROCARDIOGRAM TRACING: CPT

## 2024-09-30 PROCEDURE — 71045 X-RAY EXAM CHEST 1 VIEW: CPT

## 2024-09-30 PROCEDURE — 96374 THER/PROPH/DIAG INJ IV PUSH: CPT

## 2024-09-30 PROCEDURE — 84439 ASSAY OF FREE THYROXINE: CPT | Performed by: EMERGENCY MEDICINE

## 2024-09-30 PROCEDURE — 36415 COLL VENOUS BLD VENIPUNCTURE: CPT | Performed by: EMERGENCY MEDICINE

## 2024-09-30 PROCEDURE — 96365 THER/PROPH/DIAG IV INF INIT: CPT

## 2024-09-30 PROCEDURE — 99291 CRITICAL CARE FIRST HOUR: CPT | Performed by: EMERGENCY MEDICINE

## 2024-09-30 PROCEDURE — 83735 ASSAY OF MAGNESIUM: CPT | Performed by: EMERGENCY MEDICINE

## 2024-09-30 PROCEDURE — 85025 COMPLETE CBC W/AUTO DIFF WBC: CPT | Performed by: EMERGENCY MEDICINE

## 2024-09-30 PROCEDURE — 96376 TX/PRO/DX INJ SAME DRUG ADON: CPT

## 2024-09-30 PROCEDURE — 99285 EMERGENCY DEPT VISIT HI MDM: CPT

## 2024-09-30 PROCEDURE — 80053 COMPREHEN METABOLIC PANEL: CPT | Performed by: EMERGENCY MEDICINE

## 2024-09-30 PROCEDURE — 83880 ASSAY OF NATRIURETIC PEPTIDE: CPT | Performed by: EMERGENCY MEDICINE

## 2024-09-30 PROCEDURE — 84484 ASSAY OF TROPONIN QUANT: CPT | Performed by: EMERGENCY MEDICINE

## 2024-09-30 PROCEDURE — 96366 THER/PROPH/DIAG IV INF ADDON: CPT

## 2024-09-30 PROCEDURE — 84443 ASSAY THYROID STIM HORMONE: CPT | Performed by: EMERGENCY MEDICINE

## 2024-09-30 RX ORDER — HEPARIN SODIUM 5000 [USP'U]/ML
5000 INJECTION, SOLUTION INTRAVENOUS; SUBCUTANEOUS EVERY 8 HOURS SCHEDULED
Status: DISCONTINUED | OUTPATIENT
Start: 2024-10-01 | End: 2024-10-02 | Stop reason: HOSPADM

## 2024-09-30 RX ORDER — MAGNESIUM SULFATE HEPTAHYDRATE 40 MG/ML
2 INJECTION, SOLUTION INTRAVENOUS ONCE
Status: COMPLETED | OUTPATIENT
Start: 2024-09-30 | End: 2024-10-01

## 2024-09-30 RX ORDER — CALCIUM GLUCONATE 20 MG/ML
2 INJECTION, SOLUTION INTRAVENOUS ONCE
Status: COMPLETED | OUTPATIENT
Start: 2024-09-30 | End: 2024-10-01

## 2024-09-30 RX ORDER — METOPROLOL TARTRATE 1 MG/ML
5 INJECTION, SOLUTION INTRAVENOUS ONCE
Status: COMPLETED | OUTPATIENT
Start: 2024-09-30 | End: 2024-09-30

## 2024-09-30 RX ORDER — ASPIRIN 81 MG/1
81 TABLET, CHEWABLE ORAL DAILY
Status: DISCONTINUED | OUTPATIENT
Start: 2024-10-01 | End: 2024-10-02 | Stop reason: HOSPADM

## 2024-09-30 RX ORDER — INSULIN LISPRO 100 [IU]/ML
1-6 INJECTION, SOLUTION INTRAVENOUS; SUBCUTANEOUS EVERY 6 HOURS SCHEDULED
Status: DISCONTINUED | OUTPATIENT
Start: 2024-10-01 | End: 2024-10-01

## 2024-09-30 RX ORDER — ASPIRIN 81 MG/1
324 TABLET, CHEWABLE ORAL ONCE
Status: COMPLETED | OUTPATIENT
Start: 2024-09-30 | End: 2024-09-30

## 2024-09-30 RX ORDER — FUROSEMIDE 10 MG/ML
40 INJECTION INTRAMUSCULAR; INTRAVENOUS ONCE
Status: COMPLETED | OUTPATIENT
Start: 2024-09-30 | End: 2024-09-30

## 2024-09-30 RX ORDER — DILTIAZEM HYDROCHLORIDE 5 MG/ML
15 INJECTION INTRAVENOUS ONCE
Status: COMPLETED | OUTPATIENT
Start: 2024-09-30 | End: 2024-09-30

## 2024-09-30 RX ORDER — POTASSIUM CHLORIDE 14.9 MG/ML
20 INJECTION INTRAVENOUS
Status: COMPLETED | OUTPATIENT
Start: 2024-09-30 | End: 2024-10-01

## 2024-09-30 RX ORDER — CLOPIDOGREL BISULFATE 75 MG/1
75 TABLET ORAL DAILY
Status: DISCONTINUED | OUTPATIENT
Start: 2024-10-01 | End: 2024-10-02 | Stop reason: HOSPADM

## 2024-09-30 RX ORDER — CHLORHEXIDINE GLUCONATE ORAL RINSE 1.2 MG/ML
15 SOLUTION DENTAL EVERY 12 HOURS SCHEDULED
Status: DISCONTINUED | OUTPATIENT
Start: 2024-09-30 | End: 2024-10-02 | Stop reason: HOSPADM

## 2024-09-30 RX ORDER — PAROXETINE 20 MG/1
10 TABLET, FILM COATED ORAL DAILY
Status: DISCONTINUED | OUTPATIENT
Start: 2024-10-01 | End: 2024-10-02 | Stop reason: HOSPADM

## 2024-09-30 RX ADMIN — PROCAINAMIDE HYDROCHLORIDE 1 MG/MIN: 500 INJECTION INTRAMUSCULAR; INTRAVENOUS at 21:21

## 2024-09-30 RX ADMIN — DEXTROSE 150 MG: 50 INJECTION, SOLUTION INTRAVENOUS at 20:51

## 2024-09-30 RX ADMIN — CALCIUM GLUCONATE 2 G: 20 INJECTION, SOLUTION INTRAVENOUS at 23:50

## 2024-09-30 RX ADMIN — SACUBITRIL AND VALSARTAN 1 TABLET: 49; 51 TABLET, FILM COATED ORAL at 19:45

## 2024-09-30 RX ADMIN — FUROSEMIDE 40 MG: 10 INJECTION, SOLUTION INTRAMUSCULAR; INTRAVENOUS at 20:57

## 2024-09-30 RX ADMIN — DILTIAZEM HYDROCHLORIDE 15 MG: 5 INJECTION INTRAVENOUS at 19:20

## 2024-09-30 RX ADMIN — MAGNESIUM SULFATE HEPTAHYDRATE 2 G: 40 INJECTION, SOLUTION INTRAVENOUS at 23:50

## 2024-09-30 RX ADMIN — ASPIRIN 324 MG: 81 TABLET, CHEWABLE ORAL at 18:50

## 2024-09-30 RX ADMIN — DEXTROSE 150 MG: 50 INJECTION, SOLUTION INTRAVENOUS at 19:44

## 2024-09-30 RX ADMIN — SODIUM CHLORIDE 1000 ML: 0.9 INJECTION, SOLUTION INTRAVENOUS at 18:58

## 2024-09-30 RX ADMIN — METOROPROLOL TARTRATE 5 MG: 5 INJECTION, SOLUTION INTRAVENOUS at 18:45

## 2024-10-01 ENCOUNTER — APPOINTMENT (OUTPATIENT)
Dept: NON INVASIVE DIAGNOSTICS | Facility: HOSPITAL | Age: 64
DRG: 291 | End: 2024-10-01
Attending: INTERNAL MEDICINE
Payer: COMMERCIAL

## 2024-10-01 ENCOUNTER — ANESTHESIA (INPATIENT)
Dept: ANESTHESIOLOGY | Facility: HOSPITAL | Age: 64
DRG: 291 | End: 2024-10-01
Payer: COMMERCIAL

## 2024-10-01 ENCOUNTER — ANESTHESIA EVENT (INPATIENT)
Dept: ANESTHESIOLOGY | Facility: HOSPITAL | Age: 64
DRG: 291 | End: 2024-10-01
Payer: COMMERCIAL

## 2024-10-01 ENCOUNTER — APPOINTMENT (INPATIENT)
Dept: NON INVASIVE DIAGNOSTICS | Facility: HOSPITAL | Age: 64
DRG: 291 | End: 2024-10-01
Payer: COMMERCIAL

## 2024-10-01 PROBLEM — E87.6 HYPOKALEMIA: Status: ACTIVE | Noted: 2024-10-01

## 2024-10-01 PROBLEM — J96.01 ACUTE RESPIRATORY FAILURE WITH HYPOXIA (HCC): Status: ACTIVE | Noted: 2024-10-01

## 2024-10-01 PROBLEM — I49.9 VENTRICULAR ARRHYTHMIA: Status: ACTIVE | Noted: 2024-10-01

## 2024-10-01 LAB
ALBUMIN SERPL BCG-MCNC: 3.5 G/DL (ref 3.5–5)
ALP SERPL-CCNC: 65 U/L (ref 34–104)
ALT SERPL W P-5'-P-CCNC: 63 U/L (ref 7–52)
ANION GAP SERPL CALCULATED.3IONS-SCNC: 9 MMOL/L (ref 4–13)
AORTIC ROOT: 3.3 CM
APICAL FOUR CHAMBER EJECTION FRACTION: 35 %
ASCENDING AORTA: 3 CM
AST SERPL W P-5'-P-CCNC: 46 U/L (ref 13–39)
ATRIAL RATE: 115 BPM
ATRIAL RATE: 134 BPM
ATRIAL RATE: 139 BPM
ATRIAL RATE: 139 BPM
ATRIAL RATE: 140 BPM
ATRIAL RATE: 141 BPM
ATRIAL RATE: 68 BPM
BASOPHILS # BLD AUTO: 0.03 THOUSANDS/ÂΜL (ref 0–0.1)
BASOPHILS NFR BLD AUTO: 0 % (ref 0–1)
BILIRUB SERPL-MCNC: 0.62 MG/DL (ref 0.2–1)
BSA FOR ECHO PROCEDURE: 2.23 M2
BUN SERPL-MCNC: 21 MG/DL (ref 5–25)
CA-I BLD-SCNC: 1.16 MMOL/L (ref 1.12–1.32)
CA-I BLD-SCNC: 1.17 MMOL/L (ref 1.12–1.32)
CALCIUM SERPL-MCNC: 8.5 MG/DL (ref 8.4–10.2)
CHLORIDE SERPL-SCNC: 105 MMOL/L (ref 96–108)
CO2 SERPL-SCNC: 21 MMOL/L (ref 21–32)
CREAT SERPL-MCNC: 1.23 MG/DL (ref 0.6–1.3)
EOSINOPHIL # BLD AUTO: 0.04 THOUSAND/ÂΜL (ref 0–0.61)
EOSINOPHIL NFR BLD AUTO: 0 % (ref 0–6)
ERYTHROCYTE [DISTWIDTH] IN BLOOD BY AUTOMATED COUNT: 15.6 % (ref 11.6–15.1)
EST. AVERAGE GLUCOSE BLD GHB EST-MCNC: 163 MG/DL
FRACTIONAL SHORTENING: 14 (ref 28–44)
GFR SERPL CREATININE-BSD FRML MDRD: 62 ML/MIN/1.73SQ M
GLUCOSE SERPL-MCNC: 101 MG/DL (ref 65–140)
GLUCOSE SERPL-MCNC: 124 MG/DL (ref 65–140)
GLUCOSE SERPL-MCNC: 134 MG/DL (ref 65–140)
GLUCOSE SERPL-MCNC: 151 MG/DL (ref 65–140)
GLUCOSE SERPL-MCNC: 153 MG/DL (ref 65–140)
GLUCOSE SERPL-MCNC: 154 MG/DL (ref 65–140)
GLUCOSE SERPL-MCNC: 164 MG/DL (ref 65–140)
HBA1C MFR BLD: 7.3 %
HCT VFR BLD AUTO: 39.1 % (ref 36.5–49.3)
HGB BLD-MCNC: 12.6 G/DL (ref 12–17)
IMM GRANULOCYTES # BLD AUTO: 0.06 THOUSAND/UL (ref 0–0.2)
IMM GRANULOCYTES NFR BLD AUTO: 1 % (ref 0–2)
INTERVENTRICULAR SEPTUM IN DIASTOLE (PARASTERNAL SHORT AXIS VIEW): 1.4 CM
INTERVENTRICULAR SEPTUM: 1.4 CM (ref 0.6–1.1)
LA/AORTA RATIO 2D: 1.18
LAAS-AP2: 16.7 CM2
LAAS-AP4: 17.1 CM2
LEFT ATRIUM SIZE: 3.9 CM
LEFT INTERNAL DIMENSION IN SYSTOLE: 3.6 CM (ref 2.1–4)
LEFT VENTRICULAR INTERNAL DIMENSION IN DIASTOLE: 4.2 CM (ref 3.5–6)
LEFT VENTRICULAR POSTERIOR WALL IN END DIASTOLE: 1.3 CM
LEFT VENTRICULAR STROKE VOLUME: 24 ML
LVSV (TEICH): 24 ML
LYMPHOCYTES # BLD AUTO: 1.09 THOUSANDS/ÂΜL (ref 0.6–4.47)
LYMPHOCYTES NFR BLD AUTO: 11 % (ref 14–44)
MAGNESIUM SERPL-MCNC: 2.5 MG/DL (ref 1.9–2.7)
MAGNESIUM SERPL-MCNC: 2.6 MG/DL (ref 1.9–2.7)
MCH RBC QN AUTO: 27.9 PG (ref 26.8–34.3)
MCHC RBC AUTO-ENTMCNC: 32.2 G/DL (ref 31.4–37.4)
MCV RBC AUTO: 87 FL (ref 82–98)
MONOCYTES # BLD AUTO: 1.02 THOUSAND/ÂΜL (ref 0.17–1.22)
MONOCYTES NFR BLD AUTO: 10 % (ref 4–12)
NEUTROPHILS # BLD AUTO: 7.54 THOUSANDS/ÂΜL (ref 1.85–7.62)
NEUTS SEG NFR BLD AUTO: 78 % (ref 43–75)
NRBC BLD AUTO-RTO: 0 /100 WBCS
P AXIS: 33 DEGREES
P AXIS: 42 DEGREES
P AXIS: 58 DEGREES
P AXIS: 58 DEGREES
P AXIS: 81 DEGREES
PHOSPHATE SERPL-MCNC: 3 MG/DL (ref 2.3–4.1)
PLATELET # BLD AUTO: 355 THOUSANDS/UL (ref 149–390)
PLATELET # BLD AUTO: 400 THOUSANDS/UL (ref 149–390)
PMV BLD AUTO: 10 FL (ref 8.9–12.7)
PMV BLD AUTO: 10 FL (ref 8.9–12.7)
POTASSIUM SERPL-SCNC: 4.6 MMOL/L (ref 3.5–5.3)
PR INTERVAL: 126 MS
PR INTERVAL: 144 MS
PR INTERVAL: 152 MS
PR INTERVAL: 196 MS
PROT SERPL-MCNC: 7 G/DL (ref 6.4–8.4)
QRS AXIS: -34 DEGREES
QRS AXIS: 110 DEGREES
QRS AXIS: 111 DEGREES
QRSD INTERVAL: 118 MS
QRSD INTERVAL: 168 MS
QRSD INTERVAL: 170 MS
QRSD INTERVAL: 170 MS
QRSD INTERVAL: 176 MS
QRSD INTERVAL: 178 MS
QRSD INTERVAL: 186 MS
QT INTERVAL: 316 MS
QT INTERVAL: 316 MS
QT INTERVAL: 318 MS
QT INTERVAL: 322 MS
QT INTERVAL: 338 MS
QT INTERVAL: 404 MS
QT INTERVAL: 492 MS
QTC INTERVAL: 480 MS
QTC INTERVAL: 483 MS
QTC INTERVAL: 484 MS
QTC INTERVAL: 491 MS
QTC INTERVAL: 504 MS
QTC INTERVAL: 523 MS
QTC INTERVAL: 603 MS
RBC # BLD AUTO: 4.52 MILLION/UL (ref 3.88–5.62)
RIGHT VENTRICLE ID DIMENSION: 2.9 CM
SL CV LV EF: 40
SL CV PED ECHO LEFT VENTRICLE DIASTOLIC VOLUME (MOD BIPLANE) 2D: 80 ML
SL CV PED ECHO LEFT VENTRICLE SYSTOLIC VOLUME (MOD BIPLANE) 2D: 55 ML
SODIUM SERPL-SCNC: 135 MMOL/L (ref 135–147)
T WAVE AXIS: -48 DEGREES
T WAVE AXIS: -60 DEGREES
T WAVE AXIS: -60 DEGREES
T WAVE AXIS: -66 DEGREES
T WAVE AXIS: -68 DEGREES
T WAVE AXIS: -74 DEGREES
T WAVE AXIS: 113 DEGREES
TR MAX PG: 31 MMHG
TR PEAK VELOCITY: 2.8 M/S
TRICUSPID ANNULAR PLANE SYSTOLIC EXCURSION: 1.4 CM
TRICUSPID VALVE PEAK REGURGITATION VELOCITY: 2.8 M/S
VENTRICULAR RATE: 134 BPM
VENTRICULAR RATE: 134 BPM
VENTRICULAR RATE: 139 BPM
VENTRICULAR RATE: 139 BPM
VENTRICULAR RATE: 140 BPM
VENTRICULAR RATE: 141 BPM
VENTRICULAR RATE: 68 BPM
WBC # BLD AUTO: 9.78 THOUSAND/UL (ref 4.31–10.16)

## 2024-10-01 PROCEDURE — 92960 CARDIOVERSION ELECTRIC EXT: CPT

## 2024-10-01 PROCEDURE — 93010 ELECTROCARDIOGRAM REPORT: CPT | Performed by: INTERNAL MEDICINE

## 2024-10-01 PROCEDURE — 82330 ASSAY OF CALCIUM: CPT | Performed by: NURSE PRACTITIONER

## 2024-10-01 PROCEDURE — 83735 ASSAY OF MAGNESIUM: CPT | Performed by: NURSE PRACTITIONER

## 2024-10-01 PROCEDURE — C8929 TTE W OR WO FOL WCON,DOPPLER: HCPCS

## 2024-10-01 PROCEDURE — 93005 ELECTROCARDIOGRAM TRACING: CPT

## 2024-10-01 PROCEDURE — 80053 COMPREHEN METABOLIC PANEL: CPT | Performed by: NURSE PRACTITIONER

## 2024-10-01 PROCEDURE — 84100 ASSAY OF PHOSPHORUS: CPT | Performed by: NURSE PRACTITIONER

## 2024-10-01 PROCEDURE — 99223 1ST HOSP IP/OBS HIGH 75: CPT | Performed by: ANESTHESIOLOGY

## 2024-10-01 PROCEDURE — 99156 MOD SED OTH PHYS/QHP 5/>YRS: CPT | Performed by: ANESTHESIOLOGY

## 2024-10-01 PROCEDURE — 83036 HEMOGLOBIN GLYCOSYLATED A1C: CPT | Performed by: NURSE PRACTITIONER

## 2024-10-01 PROCEDURE — 82948 REAGENT STRIP/BLOOD GLUCOSE: CPT

## 2024-10-01 PROCEDURE — 5A2204Z RESTORATION OF CARDIAC RHYTHM, SINGLE: ICD-10-PCS | Performed by: INTERNAL MEDICINE

## 2024-10-01 PROCEDURE — 85049 AUTOMATED PLATELET COUNT: CPT | Performed by: NURSE PRACTITIONER

## 2024-10-01 PROCEDURE — 90673 RIV3 VACCINE NO PRESERV IM: CPT | Performed by: ANESTHESIOLOGY

## 2024-10-01 PROCEDURE — 85025 COMPLETE CBC W/AUTO DIFF WBC: CPT | Performed by: NURSE PRACTITIONER

## 2024-10-01 RX ORDER — FUROSEMIDE 20 MG
20 TABLET ORAL DAILY
Status: DISCONTINUED | OUTPATIENT
Start: 2024-10-02 | End: 2024-10-02

## 2024-10-01 RX ORDER — LIDOCAINE HYDROCHLORIDE 10 MG/ML
INJECTION, SOLUTION EPIDURAL; INFILTRATION; INTRACAUDAL; PERINEURAL AS NEEDED
Status: DISCONTINUED | OUTPATIENT
Start: 2024-10-01 | End: 2024-10-01

## 2024-10-01 RX ORDER — LEVOTHYROXINE SODIUM 125 UG/1
125 TABLET ORAL
Status: DISCONTINUED | OUTPATIENT
Start: 2024-10-01 | End: 2024-10-02 | Stop reason: HOSPADM

## 2024-10-01 RX ORDER — INSULIN LISPRO 100 [IU]/ML
1-6 INJECTION, SOLUTION INTRAVENOUS; SUBCUTANEOUS
Status: DISCONTINUED | OUTPATIENT
Start: 2024-10-01 | End: 2024-10-02 | Stop reason: HOSPADM

## 2024-10-01 RX ORDER — ATORVASTATIN CALCIUM 40 MG/1
40 TABLET, FILM COATED ORAL
Status: DISCONTINUED | OUTPATIENT
Start: 2024-10-01 | End: 2024-10-02 | Stop reason: HOSPADM

## 2024-10-01 RX ORDER — FUROSEMIDE 10 MG/ML
40 INJECTION INTRAMUSCULAR; INTRAVENOUS ONCE
Status: DISCONTINUED | OUTPATIENT
Start: 2024-10-01 | End: 2024-10-01

## 2024-10-01 RX ORDER — SODIUM CHLORIDE, SODIUM LACTATE, POTASSIUM CHLORIDE, CALCIUM CHLORIDE 600; 310; 30; 20 MG/100ML; MG/100ML; MG/100ML; MG/100ML
INJECTION, SOLUTION INTRAVENOUS CONTINUOUS PRN
Status: DISCONTINUED | OUTPATIENT
Start: 2024-10-01 | End: 2024-10-01

## 2024-10-01 RX ORDER — PHENYLEPHRINE HCL IN 0.9% NACL 1 MG/10 ML
SYRINGE (ML) INTRAVENOUS AS NEEDED
Status: DISCONTINUED | OUTPATIENT
Start: 2024-10-01 | End: 2024-10-01

## 2024-10-01 RX ORDER — AMIODARONE HYDROCHLORIDE 200 MG/1
400 TABLET ORAL 2 TIMES DAILY WITH MEALS
Status: DISCONTINUED | OUTPATIENT
Start: 2024-10-01 | End: 2024-10-02 | Stop reason: HOSPADM

## 2024-10-01 RX ORDER — METOPROLOL TARTRATE 25 MG/1
25 TABLET, FILM COATED ORAL EVERY 12 HOURS SCHEDULED
Status: DISCONTINUED | OUTPATIENT
Start: 2024-10-01 | End: 2024-10-01

## 2024-10-01 RX ORDER — PROPOFOL 10 MG/ML
INJECTION, EMULSION INTRAVENOUS AS NEEDED
Status: DISCONTINUED | OUTPATIENT
Start: 2024-10-01 | End: 2024-10-01

## 2024-10-01 RX ORDER — ALBUMIN, HUMAN INJ 5% 5 %
25 SOLUTION INTRAVENOUS ONCE
Status: DISCONTINUED | OUTPATIENT
Start: 2024-10-01 | End: 2024-10-01

## 2024-10-01 RX ADMIN — POTASSIUM CHLORIDE 20 MEQ: 14.9 INJECTION, SOLUTION INTRAVENOUS at 03:20

## 2024-10-01 RX ADMIN — PROPOFOL 50 MG: 10 INJECTION, EMULSION INTRAVENOUS at 09:45

## 2024-10-01 RX ADMIN — PERFLUTREN 0.4 ML/MIN: 6.52 INJECTION, SUSPENSION INTRAVENOUS at 08:38

## 2024-10-01 RX ADMIN — Medication 12.5 MG: at 21:16

## 2024-10-01 RX ADMIN — CLOPIDOGREL 75 MG: 75 TABLET ORAL at 10:16

## 2024-10-01 RX ADMIN — HEPARIN SODIUM 5000 UNITS: 5000 INJECTION INTRAVENOUS; SUBCUTANEOUS at 21:17

## 2024-10-01 RX ADMIN — ATORVASTATIN CALCIUM 40 MG: 40 TABLET, FILM COATED ORAL at 15:39

## 2024-10-01 RX ADMIN — CHLORHEXIDINE GLUCONATE 0.12% ORAL RINSE 15 ML: 1.2 LIQUID ORAL at 10:16

## 2024-10-01 RX ADMIN — PAROXETINE 10 MG: 20 TABLET, FILM COATED ORAL at 10:16

## 2024-10-01 RX ADMIN — LEVOTHYROXINE SODIUM 125 MCG: 125 TABLET ORAL at 05:53

## 2024-10-01 RX ADMIN — HEPARIN SODIUM 5000 UNITS: 5000 INJECTION INTRAVENOUS; SUBCUTANEOUS at 14:43

## 2024-10-01 RX ADMIN — LIDOCAINE HYDROCHLORIDE 50 MG: 10 INJECTION, SOLUTION EPIDURAL; INFILTRATION; INTRACAUDAL; PERINEURAL at 09:45

## 2024-10-01 RX ADMIN — HEPARIN SODIUM 5000 UNITS: 5000 INJECTION INTRAVENOUS; SUBCUTANEOUS at 05:53

## 2024-10-01 RX ADMIN — ASPIRIN 81 MG: 81 TABLET, CHEWABLE ORAL at 10:16

## 2024-10-01 RX ADMIN — SODIUM CHLORIDE, SODIUM LACTATE, POTASSIUM CHLORIDE, AND CALCIUM CHLORIDE: .6; .31; .03; .02 INJECTION, SOLUTION INTRAVENOUS at 09:35

## 2024-10-01 RX ADMIN — Medication 200 MCG: at 09:45

## 2024-10-01 RX ADMIN — POTASSIUM CHLORIDE 20 MEQ: 14.9 INJECTION, SOLUTION INTRAVENOUS at 00:52

## 2024-10-01 RX ADMIN — INFLUENZA A VIRUS A/WEST VIRGINIA/30/2022 (H1N1) RECOMBINANT HEMAGGLUTININ ANTIGEN, INFLUENZA A VIRUS A/MASSACHUSETTS/18/2022 (H3N2) RECOMBINANT HEMAGGLUTININ ANTIGEN, AND INFLUENZA B VIRUS B/AUSTRIA/1359417/2021 RECOMBINANT HEMAGGLUTININ ANTIGEN 0.5 ML: 45; 45; 45 INJECTION INTRAMUSCULAR at 18:18

## 2024-10-01 RX ADMIN — AMIODARONE HYDROCHLORIDE 1 MG/MIN: 50 INJECTION, SOLUTION INTRAVENOUS at 10:26

## 2024-10-01 RX ADMIN — AMIODARONE HYDROCHLORIDE 400 MG: 200 TABLET ORAL at 15:39

## 2024-10-01 RX ADMIN — INSULIN LISPRO 1 UNITS: 100 INJECTION, SOLUTION INTRAVENOUS; SUBCUTANEOUS at 05:55

## 2024-10-01 RX ADMIN — PROPOFOL 20 MG: 10 INJECTION, EMULSION INTRAVENOUS at 09:47

## 2024-10-01 NOTE — ED NOTES
Called lab for second time on update for result for HS Troponin 0hr. Per , the individual performing the lab said it should be around 15 minutes for a result as they were having difficulty with their equipment.      Michelle Hall  09/30/24 2378

## 2024-10-01 NOTE — ASSESSMENT & PLAN NOTE
Patient has history of cardiac arrest in 2018 secondary to 100% LAD occlusion   He underwent PCI with CRUZ to LAD   EF at that time 20-25% prompting AICD placement   Presents with shortness of breath   Stable Ventricular arrhythmia noted on admission   Patient denies AICD defibrillation   Will interrogate AICD   Trial of amiodarone failed to convert, transitioned to procainamide   Will continue procainamide and titrate for abatement   If unsuccessful, will likely need cardioversion   NPO after midnight   Denies chest pain  HS troponins negative   Hypokalemia noted, will replete electrolytes to maintain K > 4.0 and Mg >2.0  TSH abnormal at 10.049.  However, free T4 within normal limits   Repeat echocardiogram   Cardiology consultation, appreciate recommendations  May need to consult EP for AICD setting adjustment

## 2024-10-01 NOTE — ASSESSMENT & PLAN NOTE
Patient states statin relate  Atorvastatin 40 mg (home dose) has been held in the outpatient setting  Overall down trending  Will continue to hold for now

## 2024-10-01 NOTE — ASSESSMENT & PLAN NOTE
"Lab Results   Component Value Date    HGBA1C 7.6 (H) 08/30/2024       No results for input(s): \"POCGLU\" in the last 72 hours.    Blood Sugar Average: Last 72 hrs:    Will hold home dose agents  Utilize SSI coverage while in acute phase and NPO   "

## 2024-10-01 NOTE — UTILIZATION REVIEW
NOTIFICATION OF INPATIENT ADMISSION   AUTHORIZATION REQUEST   SERVICING FACILITY:   [place] [address]  Tax ID: [id number]  NPI: [id number] ATTENDING PROVIDER:  Attending Name and NPI#: Curt Laboy Do [[id number]]  Address: [address] [place] [address]  Phone: [phone]     ADMISSION INFORMATION:  Place of Service: Inpatient [facility]  Place of Service Code: 21  Inpatient Admission Date/Time: 9/30/24 11:24 PM  Discharge Date/Time: No discharge date for patient encounter.  Admitting Diagnosis Code/Description:  V-tach (HCC) [I47.20]  SOB (shortness of breath) [R06.02]  Atrial fib/flutter, transient (HCC) [I48.91, I48.92]     UTILIZATION REVIEW CONTACT:  Pastora Hancock, Utilization   Network Utilization Review Department  Phone: [phone]  Fax [phone]  Email: [email or web address]  Contact for approvals/pending authorizations, clinical reviews, and discharge.     PHYSICIAN ADVISORY SERVICES:  Medical Necessity Denial & Jrls-az-Kvya Review  Phone: [phone]  Fax: [phone]  Email: [email or web address]     DISCHARGE SUPPORT TEAM:  For Patients Discharge Needs & Updates  Phone: [phone] opt. 2 Fax: [phone]  Email: [email or web address]

## 2024-10-01 NOTE — ASSESSMENT & PLAN NOTE
Lab Results   Component Value Date    HGBA1C 7.3 (H) 10/01/2024     Recent Labs     10/01/24  1221 10/01/24  1751 10/01/24  2151 10/01/24  2329   POCGLU 134 154* 124 101     Blood Sugar Average: Last 72 hrs:  (P) 138  Will hold home dose agents  Utilize SSI coverage while in acute phase and NPO   Restarted diet, continue insulin QID

## 2024-10-01 NOTE — PLAN OF CARE
Problem: SAFETY ADULT  Goal: Patient will remain free of falls  Description: INTERVENTIONS:  - Educate patient/family on patient safety including physical limitations  - Instruct patient to call for assistance with activity   - Consult OT/PT to assist with strengthening/mobility   - Keep Call bell within reach  - Keep bed low and locked with side rails adjusted as appropriate  - Keep care items and personal belongings within reach  - Initiate and maintain comfort rounds  - Make Fall Risk Sign visible to staff  - Offer Toileting every 2 Hours, in advance of need  - Initiate/Maintain bed alarm  - Obtain necessary fall risk management equipment:   - Apply yellow socks and bracelet for high fall risk patients  - Consider moving patient to room near nurses station  10/1/2024 0333 by Angelica Farrell  Outcome: Progressing  10/1/2024 0048 by Angelica Farrell  Outcome: Progressing  Goal: Maintain or return to baseline ADL function  Description: INTERVENTIONS:  -  Assess patient's ability to carry out ADLs; assess patient's baseline for ADL function and identify physical deficits which impact ability to perform ADLs (bathing, care of mouth/teeth, toileting, grooming, dressing, etc.)  - Assess/evaluate cause of self-care deficits   - Assess range of motion  - Assess patient's mobility; develop plan if impaired  - Assess patient's need for assistive devices and provide as appropriate  - Encourage maximum independence but intervene and supervise when necessary  - Involve family in performance of ADLs  - Assess for home care needs following discharge   - Consider OT consult to assist with ADL evaluation and planning for discharge  - Provide patient education as appropriate  10/1/2024 0333 by Angelica Farrell  Outcome: Progressing  10/1/2024 0048 by Angelica Farrell  Outcome: Progressing  Goal: Maintains/Returns to pre admission functional level  Description: INTERVENTIONS:  - Perform AM-PAC 6 Click Basic Mobility/ Daily Activity  assessment daily.  - Set and communicate daily mobility goal to care team and patient/family/caregiver.   - Collaborate with rehabilitation services on mobility goals if consulted  - Perform Range of Motion 3 times a day.  - Reposition patient every 2 hours.  - Dangle patient 3 times a day  - Stand patient 3 times a day  - Ambulate patient 3 times a day  - Out of bed to chair 3 times a day   - Out of bed for meals 3 times a day  - Out of bed for toileting  - Record patient progress and toleration of activity level   10/1/2024 0333 by Angelica Farrell  Outcome: Progressing  10/1/2024 0048 by Angelica Farrell  Outcome: Progressing     Problem: Knowledge Deficit  Goal: Patient/family/caregiver demonstrates understanding of disease process, treatment plan, medications, and discharge instructions  Description: Complete learning assessment and assess knowledge base.  Interventions:  - Provide teaching at level of understanding  - Provide teaching via preferred learning methods  10/1/2024 0333 by Angelica Farrell  Outcome: Progressing  10/1/2024 0048 by Angelica Farrell  Outcome: Progressing     Problem: CARDIOVASCULAR - ADULT  Goal: Maintains optimal cardiac output and hemodynamic stability  Description: INTERVENTIONS:  - Monitor I/O, vital signs and rhythm  - Monitor for S/S and trends of decreased cardiac output  - Administer and titrate ordered vasoactive medications to optimize hemodynamic stability  - Assess quality of pulses, skin color and temperature  - Assess for signs of decreased coronary artery perfusion  - Instruct patient to report change in severity of symptoms  Reactivated  Goal: Absence of cardiac dysrhythmias or at baseline rhythm  Description: INTERVENTIONS:  - Continuous cardiac monitoring, vital signs, obtain 12 lead EKG if ordered  - Administer antiarrhythmic and heart rate control medications as ordered  - Monitor electrolytes and administer replacement therapy as ordered  Reactivated     Problem:  METABOLIC, FLUID AND ELECTROLYTES - ADULT  Goal: Electrolytes maintained within normal limits  Description: INTERVENTIONS:  - Monitor labs and assess patient for signs and symptoms of electrolyte imbalances  - Administer electrolyte replacement as ordered  - Monitor response to electrolyte replacements, including repeat lab results as appropriate  - Instruct patient on fluid and nutrition as appropriate  Reactivated  Goal: Fluid balance maintained  Description: INTERVENTIONS:  - Monitor labs   - Monitor I/O and WT  - Instruct patient on fluid and nutrition as appropriate  - Assess for signs & symptoms of volume excess or deficit  Reactivated  Goal: Glucose maintained within target range  Description: INTERVENTIONS:  - Monitor Blood Glucose as ordered  - Assess for signs and symptoms of hyperglycemia and hypoglycemia  - Administer ordered medications to maintain glucose within target range  - Assess nutritional intake and initiate nutrition service referral as needed  Reactivated

## 2024-10-01 NOTE — ANESTHESIA PREPROCEDURE EVALUATION
Procedure:  Cardioversion    Relevant Problems   CARDIO   (+) CAD (coronary artery disease)   (+) Cardiac arrest (HCC)   (+) STEMI (ST elevation myocardial infarction) ([facility])   (+) Ventricular arrhythmia   (+) Ventricular fibrillation ([facility])      ENDO   (+) Type 2 diabetes mellitus, without long-term current use of insulin ([facility])      NEURO/PSYCH   (+) Anxiety             Anesthesia Plan  ASA Score- 4     Anesthesia Type- IV sedation with anesthesia with ASA Monitors.         Additional Monitors:     Airway Plan:            Plan Factors-Exercise tolerance (METS): >4 METS.    Chart reviewed. EKG reviewed. Imaging results reviewed. Existing labs reviewed. Patient summary reviewed.    Patient is not a current smoker.              Induction- intravenous.    Postoperative Plan-     Perioperative Resuscitation Plan - Level 1 - Full Code.       Informed Consent- Anesthetic plan and risks discussed with patient.  I personally reviewed this patient with the CRNA. Discussed and agreed on the Anesthesia Plan with the CRNA..

## 2024-10-01 NOTE — PLAN OF CARE
Problem: PAIN - ADULT  Goal: Verbalizes/displays adequate comfort level or baseline comfort level  Description: Interventions:  - Encourage patient to monitor pain and request assistance  - Assess pain using appropriate pain scale  - Administer analgesics based on type and severity of pain and evaluate response  - Implement non-pharmacological measures as appropriate and evaluate response  - Consider cultural and social influences on pain and pain management  - Notify physician/advanced practitioner if interventions unsuccessful or patient reports new pain  Outcome: Progressing     Problem: INFECTION - ADULT  Goal: Absence or prevention of progression during hospitalization  Description: INTERVENTIONS:  - Assess and monitor for signs and symptoms of infection  - Monitor lab/diagnostic results  - Monitor all insertion sites, i.e. indwelling lines, tubes, and drains  - Monitor endotracheal if appropriate and nasal secretions for changes in amount and color  - Ranger appropriate cooling/warming therapies per order  - Administer medications as ordered  - Instruct and encourage patient and family to use good hand hygiene technique  - Identify and instruct in appropriate isolation precautions for identified infection/condition  Outcome: Progressing  Goal: Absence of fever/infection during neutropenic period  Description: INTERVENTIONS:  - Monitor WBC    Outcome: Progressing     Problem: Knowledge Deficit  Goal: Patient/family/caregiver demonstrates understanding of disease process, treatment plan, medications, and discharge instructions  Description: Complete learning assessment and assess knowledge base.  Interventions:  - Provide teaching at level of understanding  - Provide teaching via preferred learning methods  Outcome: Progressing     Problem: CARDIOVASCULAR - ADULT  Goal: Maintains optimal cardiac output and hemodynamic stability  Description: INTERVENTIONS:  - Monitor I/O, vital signs and rhythm  - Monitor  for S/S and trends of decreased cardiac output  - Administer and titrate ordered vasoactive medications to optimize hemodynamic stability  - Assess quality of pulses, skin color and temperature  - Assess for signs of decreased coronary artery perfusion  - Instruct patient to report change in severity of symptoms  Outcome: Progressing  Goal: Absence of cardiac dysrhythmias or at baseline rhythm  Description: INTERVENTIONS:  - Continuous cardiac monitoring, vital signs, obtain 12 lead EKG if ordered  - Administer antiarrhythmic and heart rate control medications as ordered  - Monitor electrolytes and administer replacement therapy as ordered  Outcome: Progressing

## 2024-10-01 NOTE — CASE MANAGEMENT
Case Management Assessment & Discharge Planning Note    Patient name Billy Zhang  Location ICU 04/ICU 04 MRN [id number]  : 1960 Date 10/1/2024       Current Admission Date: 2024  Current Admission Diagnosis:Ventricular arrhythmia   Patient Active Problem List    Diagnosis Date Noted Date Diagnosed    Ventricular arrhythmia 10/01/2024     Hypokalemia 10/01/2024     Acute respiratory failure with hypoxia (HCC) 10/01/2024     Urinary retention 2018     Type 2 diabetes mellitus, without long-term current use of insulin (HCC) 2018     Cardiac arrest (HCC)      Ventricular fibrillation (HCC) 2018     Anxiety 2018     STEMI (ST elevation myocardial infarction) (HCC) 2018     CAD (coronary artery disease) 2018     Transaminitis 2018       LOS (days): 1  Geometric Mean LOS (GMLOS) (days): 3.9  Days to GMLOS:3.1     OBJECTIVE:    Risk of Unplanned Readmission Score: 12.86         Current admission status: Inpatient       Preferred Pharmacy:   [id number] - [facility] [place] - [address]  [facility]  [facility] [place] [address]  Phone: [phone] Fax: [phone]    Primary Care Provider: Hugo Almonte MD    Primary Insurance: [facility] REP  Secondary Insurance:     ASSESSMENT:  Active Health Care Proxies    There are no active Health Care Proxies on file.       Advance Directives  Does patient have a Health Care POA?: No  Was patient offered paperwork?: Yes (declined)  Does patient have Advance Directives?: No  Was patient offered paperwork?: Yes (declined)  Primary Contact: johnhuntercleopatra muñiz (Son)  [phone] (Mobile)         Readmission Root Cause  30 Day Readmission: No    Patient Information  Admitted from:: Home  Mental Status: Alert  During Assessment patient was accompanied by: Son  Assessment information provided by:: Patient  Primary Caregiver: Self  Support Systems: Son  County of Residence: [place]  What city do you live in?:  [place]  Home entry access options. Select all that apply.: Stairs  Number of steps to enter home.: 2  Do the steps have railings?: Yes  Type of Current Residence: Other (Comment) (\"tri level\")  Living Arrangements: Lives w/ Son  Is patient a ?: No    Activities of Daily Living Prior to Admission  Functional Status: Independent  Completes ADLs independently?: Yes  Ambulates independently?: Yes  Does patient use assisted devices?: Yes  Assisted Devices (DME) used: Other (Comment) (tub bench, [employer] cardiac monitor)  Does patient currently own DME?: Yes  What DME does the patient currently own?: Other (Comment) (tub bench, [employer] cardiac monitor)  Does patient have a history of Outpatient Therapy (PT/OT)?: Yes  Does the patient have a history of Short-Term Rehab?: No  Does patient have a history of HHC?: No  Does patient currently have HHC?: No         Patient Information Continued  Income Source: SSI/SSD  Does patient have prescription coverage?: Yes  Does patient receive dialysis treatments?: No  Does patient have a history of substance abuse?: No  Does patient have a history of Mental Health Diagnosis?: Yes (anxiety)  Is patient receiving treatment for mental health?: Yes  Has patient received inpatient treatment related to mental health in the last 2 years?: No         Means of Transportation  Means of Transport to Appts:: Family transport      Social Determinants of Health (SDOH)      Flowsheet Row Most Recent Value   Housing Stability    In the last 12 months, was there a time when you were not able to pay the mortgage or rent on time? Y  [pt reports [employer] company was able to assist]   In the past 12 months, how many times have you moved where you were living? 0   At any time in the past 12 months, were you homeless or living in a shelter (including now)? N   Transportation Needs    In the past 12 months, has lack of transportation kept you from medical appointments or from getting " medications? no   In the past 12 months, has lack of transportation kept you from meetings, work, or from getting things needed for daily living? No   Food Insecurity    Within the past 12 months, you worried that your food would run out before you got the money to buy more. Often true   Within the past 12 months, the food you bought just didn't last and you didn't have money to get more. Often true   Utilities    In the past 12 months has the electric, gas, oil, or water company threatened to shut off services in your home? No            DISCHARGE DETAILS:    Discharge planning discussed with:: pt;  son present  Freedom of Choice: Yes  Comments - Freedom of Choice: Met w pt/family at bedside; introduced self and explained role of CM, including arranging DME, STR, home health, out pt tx.  Advised pt/family that CM will make appropriate referrals based on treatment team recommendations and MD orders, and they can consider recommended plan of care and choose from available vendors.  CM contacted family/caregiver?: Yes  Were Treatment Team discharge recommendations reviewed with patient/caregiver?:  (none at present)          Contacts  Patient Contacts: cleopatra marcano (Son)  [phone] (Mobile)  Relationship to Patient:: Family  Contact Method: In Person  Reason/Outcome: Continuity of Care, Discharge Planning, Emergency Contact         DME Referral Provided  Referral made for DME?: No    Other Referral/Resources/Interventions Provided:  Referral Comments: Pt admitted for ventricular arrhythmia;  scheduled for cardioversion today.  O2 2L NC, IV Ca gluc, IV MgSO4, IV KCl, IV amioderone gtt, IV pronestyl.         Treatment Team Recommendation: Other (TBD)  Discharge Destination Plan:: Home  Transport at Discharge : Family

## 2024-10-01 NOTE — ASSESSMENT & PLAN NOTE
Had PCI to LAD in 2018   Maintained on ASA/palvix/statin/BB/entresto  Will continue ASA and plavix   Resume statin, transaminases minimally elevated  Now s/p DCCV on 10/1, returned to NSR  Will restart BB, entresto as BP allows

## 2024-10-01 NOTE — DISCHARGE INSTRUCTIONS
Patient Education     Cardioversion Discharge Instructions   About this topic   Your heart has an electrical system that controls each heartbeat and signals your heart to pump blood. The signal starts in the top chambers of the heart and moves to the bottom chambers. The signal repeats with each heartbeat.     A problem in the signal can cause an abnormal heartbeat or arrhythmia. With an abnormal heartbeat, the heart may beat too fast or too slow. It may also beat in an irregular pattern. If they are not treated right away, the abnormal heart rhythms may lead to serious problems, such as heart attack, stroke, and cardiac arrest. To prevent these problems, your doctor may suggest cardioversion.  Cardioversion is done to change an abnormal heart rhythm into a normal heart rhythm. Cardioversion is used to treat very fast or irregular heartbeats. It is done using an energy shock through electrodes placed on your chest or with drugs.     What care is needed at home?   Ask your doctor what you need to do when you go home. Make sure you ask questions if you do not understand what the doctor says. This way you will know what you need to do.  Get lots of rest. Sleep when you feel tired. Avoid doing tiring activities.  Ask your doctor when you may go back to your normal activities like driving or working.  What follow-up care is needed?   Your doctor may ask you to make visits to the office to check on your progress. Be sure to keep these visits.  Your doctor may want you to have more blood tests to watch your condition.  What drugs may be needed?   The doctor may order drugs to:  Prevent blood clots  Keep heartbeat normal  Will physical activity be limited?   You may have to limit your activities. Talk to your doctor about the right amount of activity for you. You may have to avoid tiring activities that may make your heart beat fast.  What problems could happen?   Pain and bruising on the area where the electrodes were  attached  Arrhythmias get worse  Blood clot  When do I need to call the doctor?   Activate the emergency medical system right away if you have signs of a heart attack or stroke. Call 911 in the [place] or [place]. The sooner treatment begins, the better your chances for recovery. Call for emergency help right away if you have:  Signs of heart attack:  Chest pain  Trouble breathing  Fast heartbeat  Feeling dizzy  Signs of stroke:  Sudden numbness or weakness of the face, arm, or leg, especially on one side of the body  Sudden confusion, trouble speaking or understanding  Sudden trouble seeing in one or both eyes  Sudden trouble walking, dizziness, loss of balance or coordination  Sudden severe headache with no known cause  Call your doctor if you have:  Shortness of breath  Very bad muscle pain or weakness  Very bad dizziness  Very upset stomach or throwing up  Teach Back: Helping You Understand   The Teach Back Method helps you understand the information we are giving you. After you talk with the staff, tell them in your own words what you learned. This helps to make sure the staff has described each thing clearly. It also helps to explain things that may have been confusing. Before going home, make sure you can do these:  I can tell you about my condition.  I can tell you when I can go back to my normal activities.  I can tell you what I will do if I have signs of a heart attack or stroke.  Last Reviewed Date   2020-02-24  Consumer Information Use and Disclaimer   This generalized information is a limited summary of diagnosis, treatment, and/or medication information. It is not meant to be comprehensive and should be used as a tool to help the user understand and/or assess potential diagnostic and treatment options. It does NOT include all information about conditions, treatments, medications, side effects, or risks that may apply to a specific patient. It is not intended to be medical advice or a substitute  for the medical advice, diagnosis, or treatment of a health care provider based on the health care provider's examination and assessment of a patient’s specific and unique circumstances. Patients must speak with a health care provider for complete information about their health, medical questions, and treatment options, including any risks or benefits regarding use of medications. This information does not endorse any treatments or medications as safe, effective, or approved for treating a specific patient. UpToDate, Inc. and its affiliates disclaim any warranty or liability relating to this information or the use thereof. The use of this information is governed by the Terms of Use, available at https://www.[employer].com/en/know/clinical-effectiveness-terms   Copyright   Copyright © 2024 UpToDate, Inc. and its affiliates and/or licensors. All rights reserved.

## 2024-10-01 NOTE — ANESTHESIA POSTPROCEDURE EVALUATION
Post-Op Assessment Note    CV Status:  Stable  Pain Score: 0    Pain management: adequate       Mental Status:  Alert and awake   Hydration Status:  Stable   PONV Controlled:  None   Airway Patency:  Patent and adequate     Post Op Vitals Reviewed: Yes    No anethesia notable event occurred.    Staff: Anesthesiologist, CRNA               /91 (10/01/24 0948)    Temp      Pulse 69 (10/01/24 0948)   Resp   16   SpO2 98 % (10/01/24 0948)

## 2024-10-01 NOTE — ED NOTES
Called lab to obtain update on results for HS Troponin 0hr. Per  it would be '15 minutes' for a result to be released.     Michelle Hall  09/30/24 4894

## 2024-10-01 NOTE — ASSESSMENT & PLAN NOTE
Patient has history of cardiac arrest in 2018 secondary to 100% LAD occlusion   He underwent PCI with CRUZ to LAD   EF at that time 20-25% prompting AICD placement   Presents with shortness of breath   Stable Ventricular arrhythmia noted on admission   Patient denies AICD defibrillation   TSH abnormal at 10.049.  However, free T4 within normal limits   Trops negative  AICD interrogation- NSVT, occassional a fib  Trial of amiodarone failed to convert, transitioned to procainamide   S/p Procainamide gtt    Plan:  S/p DCCV on 10/1, successfully returned to sinus rhythm  Per cardiology, to continue:  IV amio load x24hrs  PO amiodarone 400mg BID for 7 days  Lopressor 12.5 BID  Entresto as BP allows  Diuresis as BP allows  Hypokalemia noted, will replete electrolytes to maintain K > 4.0 and Mg >2.0  ECHO pending formal read  Appreciate cardiology recommendations

## 2024-10-01 NOTE — ASSESSMENT & PLAN NOTE
Had PCI to LAD in 2018   Maintained on ASA/palvix/statin/BB/entresto  Will continue ASA and plavix   Statin on hold for transaminitis   Continue BB as BP will allow   Continue entresto as BP will allow

## 2024-10-01 NOTE — SEPSIS NOTE
Sepsis Note   Billy Zhang 64 y.o. male MRN: [id number]  Unit/Bed#: ICU 04 Encounter: [id number]    Criteria- tachycardia (resolved post cardioversion), hypotension with SBP in 80s, tachypnea 20  Doubt infectious etiology, suspect criteria driven by pharmacology post cardioversion  Hold additional infectious work up at this time  Will hold diuresis, beta blockade, entresto at this time and follow closely  If hypotension persists, will utilize IV volume resuscitation with Albumin     Initial Sepsis Screening       Row Name 10/01/24 1151                Is the patient's history suggestive of a new or worsening infection? No  -TS                  User Key  (r) = Recorded By, (t) = Taken By, (c) = Cosigned By      Initials Name Provider Type    TS LALITHA Lujan Nurse Practitioner                  Body mass index is 31.66 kg/m².  Wt Readings from Last 1 Encounters:   10/01/24 103 kg (227 lb)     IBW (Ideal Body Weight): 75.3 kg    Ideal body weight: 75.3 kg (166 lb 0.1 oz)  Adjusted ideal body weight: 86.4 kg (190 lb 6.5 oz)    LALITHA Lujan

## 2024-10-01 NOTE — ED NOTES
HS Troponin 0hr resulted 1 hour after collection time for HS Troponin 2hr. Will obtain lab at this time.      Michelle Hall  09/30/24 9840

## 2024-10-01 NOTE — ASSESSMENT & PLAN NOTE
Patient states statin relate  Atorvastatin 40 mg (home dose) has been held in the outpatient setting  Overall down trending  Lipitor restarted while inpatient

## 2024-10-01 NOTE — H&P
"H&P - Critical Care/ICU   Name: Billy Zhang 64 y.o. male I MRN: [id number]  Unit/Bed#: ICU 04 I Date of Admission: 9/30/2024   Date of Service: 10/1/2024 I Hospital Day: 1       Assessment & Plan  Ventricular arrhythmia  Patient has history of cardiac arrest in 2018 secondary to 100% LAD occlusion   He underwent PCI with CRUZ to LAD   EF at that time 20-25% prompting AICD placement   Presents with shortness of breath   Stable Ventricular arrhythmia noted on admission   Patient denies AICD defibrillation   Will interrogate AICD   Trial of amiodarone failed to convert, transitioned to procainamide   Will continue procainamide and titrate for abatement   If unsuccessful, will likely need cardioversion   NPO after midnight   Denies chest pain  HS troponins negative   Hypokalemia noted, will replete electrolytes to maintain K > 4.0 and Mg >2.0  TSH abnormal at 10.049.  However, free T4 within normal limits   Repeat echocardiogram   Cardiology consultation, appreciate recommendations  May need to consult EP for AICD setting adjustment     Cardiac arrest (HCC)  Remote history from 2018  Secondary to 100% LAD occlusion   CAD (coronary artery disease)  Had PCI to LAD in 2018   Maintained on ASA/palvix/statin/BB/entresto  Will continue ASA and plavix   Statin on hold for transaminitis   Continue BB as BP will allow   Continue entresto as BP will allow   Transaminitis  Patient states statin relate  Atorvastatin 40 mg (home dose) has been held in the outpatient setting  Overall down trending  Will continue to hold for now   Type 2 diabetes mellitus, without long-term current use of insulin ([facility])  Lab Results   Component Value Date    HGBA1C 7.6 (H) 08/30/2024       No results for input(s): \"POCGLU\" in the last 72 hours.    Blood Sugar Average: Last 72 hrs:    Will hold home dose agents  Utilize SSI coverage while in acute phase and NPO   Hypokalemia  Replete to maintain >4.0  Repeat BMP to ensure correction "   Disposition: Critical care    History of Present Illness   Billy Zhang is a 64 y.o. who presents with a past medical history of cardiac arrest, CAD with PCI to LAD, ischemic cardiomyopathy with AICD placement, DM, HLD, anxiety, hypothyroidism.  He presents to the emergency department for evaluation of fatigue and shortness of breath since Friday.  He states that previous to that he was feeling in his normal state of health.  He denies and chest pain.  He did feel palpitations which he attributed to his anxiety.  In the emergency department, he was noted to be in a ventricular arrhythmia.  He was started on amiodarone which failed to susanne the rhythm.  He was transitioned to a procainamide gtt and was referred to the icu for further management and care.        History obtained from chart review and the patient.  Review of Systems: Review of Systems   Constitutional:  Positive for fatigue. Negative for chills and fever.   HENT: Negative.     Eyes: Negative.    Respiratory:  Positive for shortness of breath.    Cardiovascular:  Positive for palpitations. Negative for chest pain.   Gastrointestinal: Negative.    Endocrine: Negative.    Genitourinary: Negative.    Musculoskeletal: Negative.    Skin: Negative.    Allergic/Immunologic: Negative.    Neurological: Negative.    Hematological: Negative.    Psychiatric/Behavioral: Negative.         Historical Information   Past Medical History:  No date: Arthritis  11/2018: Cardiac arrest (HCC)  No date: Coronary artery disease  No date: Diabetes (HCC)  No date: Hyperlipidemia  No date: Hypertension  No date: Renal disorder Past Surgical History:  No date: CARDIAC CATHETERIZATION  No date: CARDIAC DEFIBRILLATOR PLACEMENT   Current Outpatient Medications   Medication Instructions    aspirin 81 mg, Oral, Daily    atorvastatin (LIPITOR) 40 mg, Oral, Daily with dinner    clopidogrel (PLAVIX) 75 mg, Oral, Daily    digoxin (DIGITEK) 125 mcg, Oral, Daily    furosemide  (LASIX) 20 mg, Oral, Daily    lisinopril (ZESTRIL) 10 mg, Oral, Daily    metFORMIN (FORTAMET) 1,000 mg, Oral, Daily with breakfast    metoprolol succinate (TOPROL-XL) 100 mg, Oral, Daily    Nicotine 21-14-7 MG/24HR KIT 1 patch, Transdermal, Daily    PARoxetine (PAXIL) 10 mg, Oral, Daily    potassium chloride (K-DUR) 10 mEq tablet 10 mEq, Oral, 2 times daily    tamsulosin (FLOMAX) 0.4 mg, Oral, Daily with dinner    Allergies   Allergen Reactions    Barley Grass - Food Allergy Throat Swelling      Social History     Tobacco Use    Smoking status: Former     Current packs/day: 0.00     Average packs/day: 0.3 packs/day for 30.0 years (7.5 ttl pk-yrs)     Types: Cigarettes     Start date: 1988     Quit date: 2018     Years since quittin.8    Smokeless tobacco: Never   Vaping Use    Vaping status: Never Used   Substance Use Topics    Alcohol use: No    Drug use: No    History reviewed. No pertinent family history.       Objective                          Vitals I/O      Most Recent Min/Max in 24hrs   Temp 98.2 °F (36.8 °C) Temp  Min: 98.2 °F (36.8 °C)  Max: 98.2 °F (36.8 °C)   Pulse (!) 138 Pulse  Min: 137  Max: 141   Resp 20 Resp  Min: 17  Max: 24   /68 BP  Min: 94/76  Max: 120/67   O2 Sat 96 % SpO2  Min: 92 %  Max: 97 %      Intake/Output Summary (Last 24 hours) at 10/1/2024 0019  Last data filed at 2024 2101  Gross per 24 hour   Intake 1200 ml   Output --   Net 1200 ml       Diet NPO    Invasive Monitoring           Physical Exam   Physical Exam  Eyes:      General: Lids are normal.      Extraocular Movements: Extraocular movements intact.      Conjunctiva/sclera: Conjunctivae normal.   Skin:     General: Skin is warm and dry.   HENT:      Head: Normocephalic and atraumatic.   Neck:      Trachea: Trachea normal.   Cardiovascular:      Rate and Rhythm: Tachycardia present.      Pulses: Normal pulses.   Musculoskeletal:      Cervical back: Normal range of motion.      Right lower leg: No edema.       Left lower leg: No edema.   Abdominal:      Palpations: Abdomen is soft.      Tenderness: There is no abdominal tenderness.   Constitutional:       General: He is awake.      Appearance: He is overweight.      Interventions: Nasal cannula in place.   Pulmonary:      Effort: Pulmonary effort is normal.      Breath sounds: Normal breath sounds.   Psychiatric:         Behavior: Behavior is cooperative.   Neurological:      General: No focal deficit present.      Mental Status: He is alert.      GCS: GCS eye subscore is 4. GCS verbal subscore is 5. GCS motor subscore is 6.      Sensory: Sensation is intact.          Diagnostic Studies        Lab Results: I have reviewed the following results:      Medications:  Scheduled PRN   aspirin, 81 mg, Daily  calcium gluconate, 2 g, Once  chlorhexidine, 15 mL, Q12H JUDITH  clopidogrel, 75 mg, Daily  heparin (porcine), 5,000 Units, Q8H JUDITH  insulin lispro, 1-6 Units, Q6H JUDITH  magnesium sulfate, 2 g, Once  PARoxetine, 10 mg, Daily  potassium chloride, 20 mEq, Q2H  sacubitril-valsartan, 1 tablet, BID          Continuous    procainamide (PRONESTYL) 1,000 mg in sodium chloride 0.9 % 250 mL IV infusion, 1 mg/min, Last Rate: 1 mg/min (09/30/24 2121)         Labs:   CBC    Recent Labs     09/30/24  1849   WBC 9.94   HGB 12.9   HCT 40.7        BMP    Recent Labs     09/30/24  1849   SODIUM 138   K 3.4*   *   CO2 19*   AGAP 7   BUN 19   CREATININE 1.03   CALCIUM 6.7*       Coags    No recent results     Additional Electrolytes  Recent Labs     09/30/24  1850   MG 2.3          Blood Gas    No recent results  No recent results LFTs  Recent Labs     09/30/24  1849   ALT 58*   AST 41*   ALKPHOS 57   ALB 3.1*   TBILI 0.42       Infectious  No recent results  Glucose  Recent Labs     09/30/24  1849   GLUC 139        Critical Care Time Statement: Upon my evaluation, this patient had a high probability of imminent or life-threatening deterioration due to ventricular tachycardia,  which required my direct attention, intervention, and personal management.  I spent a total of 35 minutes directly providing critical care services, including interpretation of complex medical databases, evaluating for the presence of life-threatening injuries or illnesses, management of organ system failure(s) , complex medical decision making (to support/prevent further life-threatening deterioration)., interpretation of hemodynamic data, titration of vasoactive medications, titration of continuous IV medications (drips), and managing enteral or parenteral nutritional support. This time is exclusive of procedures, teaching, family meetings, and any prior time recorded by providers other than myself.

## 2024-10-01 NOTE — UTILIZATION REVIEW
Initial Clinical Review    Admission: Date/Time/Statement:   Admission Orders (From admission, onward)       Ordered        09/30/24 2324  INPATIENT ADMISSION  Once                          Orders Placed This Encounter   Procedures    INPATIENT ADMISSION     Standing Status:   Standing     Number of Occurrences:   1     Order Specific Question:   Level of Care     Answer:   Med Surg [16]     Order Specific Question:   Bed request comments     Answer:   tele     Order Specific Question:   Estimated length of stay     Answer:   More than 2 Midnights     Order Specific Question:   Certification     Answer:   I certify that inpatient services are medically necessary for this patient for a duration of greater than two midnights. See H&P and MD Progress Notes for additional information about the patient's course of treatment.     ED Arrival Information       Expected   -    Arrival   9/30/2024 18:15    Acuity   Emergent              Means of arrival   Ambulance    Escorted by   [facility]    Service   Critical Care/ICU    Admission type   Emergency              Arrival complaint   SOB             Chief Complaint   Patient presents with    Shortness of Breath     Pt arrives with EMS from urgent care with c/o SOB since Saturday. Pt has hx of MI in 2018, has pacer/defib placed. Denies CP. Per EMS EKG was abnormal at , which prompted them to summon EMS for transport to hospital for further eval       Initial Presentation: 64 y.o. male to the ED from urgent care via EMS with complaints of   Shortness of breath, fatigue for 3 days.  H/O cardiac arrest, CAD with PCI to LAD, ischemic cardiomyopathy with AICD placement, DM, HLD, anxiety, hypothyroidism.  Admitted to inpatient for ventricular arrythmia.  Arrives tachycardic, noted to be in vtachycardia.  Attempted amiodarone iv in the ED and metoprolol. Unsuccessful. Started on procainimide and admitted to CRITICAL CARE. Lungs decreased.  Start po amio load. Cardiology  consult. TTE.  Maintained on entresto, asa, plavix     Date: 10/1   Day 2:   Plan for TTE cardioversion. KEep NPO.  Repeat ECHo.  May need EP for aicd settings adjusted. Decreased breath sounds. Episode of hypotension. Doubt infectious etiology. Hold diuresis, bb, entresto.     PROCEDURE NOTE  10/1 CARDIOVERSION  Addenda  Cardioversion was performed under IV conscious sedation after informed consent had been obtained.  A 200 joules biphasic shock was delivered, which promptly restored NSR.  Pt tolerated procedure well without complications.    ED Treatment-Medication Administration from 09/30/2024 1815 to 10/01/2024 0017         Date/Time Order Dose Route Action     09/30/2024 1845 metoprolol (LOPRESSOR) injection 5 mg 5 mg Intravenous Given     09/30/2024 1850 aspirin chewable tablet 324 mg 324 mg Oral Given     09/30/2024 1858 sodium chloride 0.9 % bolus 1,000 mL 1,000 mL Intravenous New Bag     09/30/2024 1920 diltiazem (CARDIZEM) injection 15 mg 15 mg Intravenous Given     09/30/2024 1945 sacubitril-valsartan (ENTRESTO) 49-51 MG per tablet 1 tablet 1 tablet Oral Given     09/30/2024 1944 amiodarone 150 mg in dextrose 5 % 100 mL IV bolus 150 mg Intravenous New Bag     09/30/2024 2051 amiodarone 150 mg in dextrose 5 % 100 mL IV bolus 150 mg Intravenous New Bag     09/30/2024 2057 furosemide (LASIX) injection 40 mg 40 mg Intravenous Given     09/30/2024 2121 procainamide (PRONESTYL) 1,000 mg in sodium chloride 0.9 % 250 mL IV infusion 1 mg/min Intravenous New Bag     09/30/2024 2350 calcium gluconate 2 g in sodium chloride 0.9% 100 mL (premix) 2 g Intravenous New Bag     09/30/2024 2350 magnesium sulfate 2 g/50 mL IVPB (premix) 2 g 2 g Intravenous New Bag            Scheduled Medications:  amiodarone, 400 mg, Oral, BID With Meals  aspirin, 81 mg, Oral, Daily  atorvastatin, 40 mg, Oral, Daily With Dinner  chlorhexidine, 15 mL, Mouth/Throat, Q12H JUDITH  clopidogrel, 75 mg, Oral, Daily  furosemide, 40 mg, Intravenous,  Once    heparin (porcine), 5,000 Units, Subcutaneous, Q8H JUDITH  insulin lispro, 1-6 Units, Subcutaneous, Q6H JUDITH  levothyroxine, 125 mcg, Oral, Early Morning  metoprolol tartrate, 12.5 mg, Oral, Q12H JUDITH  PARoxetine, 10 mg, Oral, Daily  sacubitril-valsartan, 1 tablet, Oral, BID      Continuous IV Infusions:  amiodarone (CORDARONE) 900 mg in dextrose 5 % 500 mL infusion, 1 mg/min, Intravenous, Continuous      PRN Meds:     ED Triage Vitals   Temperature Pulse Respirations Blood Pressure SpO2 Pain Score   09/30/24 1828 09/30/24 1823 09/30/24 1823 09/30/24 1823 09/30/24 1823 10/01/24 0049   98.2 °F (36.8 °C) (!) 141 20 112/84 95 % No Pain     Weight (last 2 days)       Date/Time Weight    10/01/24 0800 103 (227)    10/01/24 0600 103 (227.07)    10/01/24 0106 103 (226.41)    09/30/24 2334 103 (226.41)            Vital Signs (last 3 days)       Date/Time Temp Pulse Resp BP MAP (mmHg) SpO2 Calculated FIO2 (%) - Nasal Cannula Nasal Cannula O2 Flow Rate (L/min) O2 Device Patient Position - Orthostatic VS Iliana Coma Scale Score Pain    10/01/24 1400 -- 75 -- 111/64 83 96 % -- -- None (Room air) Sitting -- No Pain    10/01/24 1300 -- 71 -- 96/62 72 96 % -- -- -- Sitting -- No Pain    10/01/24 1200 -- 66 -- 92/55 67 95 % -- -- -- Sitting 15 No Pain    10/01/24 1130 -- 68 -- 89/55 67 93 % -- -- None (Room air) Lying -- --    10/01/24 1115 -- 68 -- 83/55 64 94 % -- -- -- Lying -- --    10/01/24 1100 98.1 °F (36.7 °C) 66 20 96/59 73 94 % -- -- None (Room air) Lying -- No Pain    10/01/24 1000 -- 69 -- 95/54 70 95 % -- -- None (Room air) -- -- --    10/01/24 0948 -- 69 -- 110/91 98 98 % -- -- -- -- -- --    10/01/24 0945 -- 135 -- 103/76 86 95 % -- -- -- -- -- --    10/01/24 0800 -- 134 -- 131/72 -- -- 28 2 L/min Nasal cannula -- 15 No Pain    10/01/24 0700 98.2 °F (36.8 °C) 133 21 131/72 87 95 % -- -- -- Lying -- --    10/01/24 0530 -- -- 20 -- -- -- -- -- -- -- -- --    10/01/24 0500 -- 134 21 97/71 78 93 % -- -- Nasal cannula  -- -- --    10/01/24 0400 -- -- -- -- -- -- -- -- -- -- 15 --    10/01/24 0300 98.1 °F (36.7 °C) 134 18 97/71 80 94 % -- -- None (Room air) Lying -- --    10/01/24 0215 -- -- 20 -- -- -- -- -- -- -- -- --    10/01/24 0200 -- 135 20 94/67 77 93 % -- -- -- Lying 15 No Pain    10/01/24 0130 -- -- 20 -- -- -- -- -- -- -- -- --    10/01/24 0115 -- 134 20 129/69 81 95 % 28 2 L/min -- -- -- --    10/01/24 0106 98.2 °F (36.8 °C) 134 20 135/95 112 94 % -- -- -- Sitting -- --    10/01/24 0049 -- -- -- -- -- -- -- -- -- -- 15 No Pain    09/30/24 2300 -- 138 20 114/68 83 96 % -- -- None (Room air) Sitting -- --    09/30/24 2245 -- 138 21 102/64 79 97 % 28 2 L/min Nasal cannula Sitting -- --    09/30/24 2230 -- 140 20 120/67 85 95 % 28 2 L/min Nasal cannula Sitting -- --    09/30/24 2200 -- 137 19 114/87 97 93 % 28 2 L/min Nasal cannula Sitting -- --    09/30/24 2145 -- 137 18 109/81 89 93 % 28 2 L/min Nasal cannula Sitting -- --    09/30/24 2115 -- 137 19 113/92 100 92 % 28 2 L/min Nasal cannula Sitting -- --    09/30/24 2100 -- 140 20 111/75 89 95 % 28 2 L/min Nasal cannula Sitting -- --    09/30/24 2045 -- 140 19 116/89 98 93 % 28 2 L/min Nasal cannula Sitting -- --    09/30/24 2035 -- 140 17 103/76 -- 94 % 28 2 L/min Nasal cannula Sitting -- --    09/30/24 2030 -- 140 19 94/76 81 93 % 28 2 L/min Nasal cannula Sitting -- --    09/30/24 2015 -- 140 24 113/88 98 92 % 28 2 L/min Nasal cannula Sitting -- --    09/30/24 1945 -- 141 19 118/91 100 93 % 28 2 L/min Nasal cannula Sitting -- --    09/30/24 1915 -- 138 18 119/79 95 95 % 28 2 L/min Nasal cannula Sitting -- --    09/30/24 1900 -- 140 19 119/79 -- 95 % 28 2 L/min Nasal cannula Sitting -- --    09/30/24 1843 -- -- -- -- -- -- -- -- Nasal cannula -- -- --    09/30/24 1828 98.2 °F (36.8 °C) -- -- -- -- -- -- -- -- -- -- --    09/30/24 1823 -- 141 20 112/84 -- 95 % 28 2 L/min Nasal cannula Sitting -- --              Pertinent Labs/Diagnostic Test Results:   Radiology:  X-ray  chest 1 view portable   Final Interpretation by Luz Marina Dixon MD (10/01 0712)      Moderate pulmonary edema.            Workstation performed: [id number]           Cardiology:  ECG 12 lead    by Interface, Ris Results In (10/01 0950)      Echo complete w/ contrast if indicated   Final Result by Joseluis Rodriguez MD (10/01 1007)        Left Ventricle: Left ventricular cavity size is mildly dilated. Wall    thickness is increased. The left ventricular ejection fraction is 40%.    Systolic function is mildly reduced. LV EF is 40. There is global    hypokinesis.     The following segments are akinetic: apical septal.     The following segments are hypokinetic: basal anterior, basal    anteroseptal, basal inferoseptal, basal inferior, basal inferolateral,    basal anterolateral, mid anterior, mid anteroseptal, mid inferoseptal, mid    inferior, mid inferolateral, mid anterolateral, apical anterior, apical    inferior, apical lateral and apex.     Mitral Valve: There is mild regurgitation.     Tricuspid Valve: There is mild regurgitation.           9/30 EKG @1829:   Narrative & Impression    Wide complex tachycardia  Right bundle branch block  Left posterior fascicular block   Bifascicular block    9/30EKG @ 2249:        Narrative & Impression    Wide complex tachycardia  Right bundle branch block  Left posterior fascicular block    Bifascicular block           10/1 0949 EKG:   Narrative & Impression    Normal sinus rhythm  Left axis deviation  Incomplete right bundle branch block  Anterolateral infarct , age undetermined  Inferior infarct Age indeterminate  Prolonged QT  Abnormal ECG         Results from last 7 days   Lab Units 10/01/24  0432 10/01/24  0038 09/30/24  1849   WBC Thousand/uL 9.78  --  9.94   HEMOGLOBIN g/dL 12.6  --  12.9   HEMATOCRIT % 39.1  --  40.7   PLATELETS Thousands/uL 355 400* 356   TOTAL NEUT ABS Thousands/µL 7.54  --  7.37         Results from last 7 days   Lab Units 10/01/24  0432  10/01/24  0038 09/30/24  1850 09/30/24  1849   SODIUM mmol/L 135  --   --  138   POTASSIUM mmol/L 4.6  --   --  3.4*   CHLORIDE mmol/L 105  --   --  112*   CO2 mmol/L 21  --   --  19*   ANION GAP mmol/L 9  --   --  7   BUN mg/dL 21  --   --  19   CREATININE mg/dL 1.23  --   --  1.03   EGFR ml/min/1.73sq m 62  --   --  77   CALCIUM mg/dL 8.5  --   --  6.7*   CALCIUM, IONIZED mmol/L 1.16 1.17  --   --    MAGNESIUM mg/dL 2.6 2.5 2.3  --    PHOSPHORUS mg/dL 3.0  --   --   --      Results from last 7 days   Lab Units 10/01/24  0432 09/30/24  1849   AST U/L 46* 41*   ALT U/L 63* 58*   ALK PHOS U/L 65 57   TOTAL PROTEIN g/dL 7.0 6.1*   ALBUMIN g/dL 3.5 3.1*   TOTAL BILIRUBIN mg/dL 0.62 0.42     Results from last 7 days   Lab Units 10/01/24  1221 10/01/24  0548 10/01/24  0106   POC GLUCOSE mg/dl 134 164* 151*     Results from last 7 days   Lab Units 10/01/24  0432 09/30/24  1849   GLUCOSE RANDOM mg/dL 153* 139         Results from last 7 days   Lab Units 10/01/24  0038   HEMOGLOBIN A1C % 7.3*   EAG mg/dl 163     Results from last 7 days   Lab Units 09/30/24  2250 09/30/24  2146 09/30/24  1849   HS TNI 0HR ng/L  --   --  28   HS TNI 2HR ng/L  --  40  --    HSTNI D2 ng/L  --  12  --    HS TNI 4HR ng/L 39  --   --    HSTNI D4 ng/L 11  --   --      Results from last 7 days   Lab Units 09/30/24  1850   TSH 3RD GENERATON uIU/mL 10.049*     Results from last 7 days   Lab Units 09/30/24  1849   BNP pg/mL 1,107*       Past Medical History:   Diagnosis Date    Arthritis     Cardiac arrest (HCC) 11/2018    Coronary artery disease     Diabetes (HCC)     Hyperlipidemia     Hypertension     Renal disorder      Present on Admission:   Transaminitis   Type 2 diabetes mellitus, without long-term current use of insulin (HCC)   CAD (coronary artery disease)   Cardiac arrest ([facility])      Admitting Diagnosis: V-tach ([facility]) [I47.20]  SOB (shortness of breath) [R06.02]  Atrial fib/flutter, transient ([facility]) [I48.91, I48.92]  Age/Sex: 64 y.o.  male    Network Utilization Review Department  ATTENTION: Please call with any questions or concerns to [phone] and carefully listen to the prompts so that you are directed to the right person. All voicemails are confidential.   For Discharge needs, contact Care Management DC Support Team at [phone] opt. [phone]  Send all requests for admission clinical reviews, approved or denied determinations and any other requests to dedicated fax number below belonging to the campus where the patient is receiving treatment. List of dedicated fax numbers for the Facilities:  FACILITY NAME UR FAX NUMBER   ADMISSION DENIALS (Administrative/Medical Necessity) [phone]   DISCHARGE SUPPORT TEAM (NETWORK) [phone]   PARENT CHILD HEALTH (Maternity/NICU/Pediatrics) [phone]   [facility] [phone]   [facility] [phone]   [facility] [phone]   [facility] [phone]   [facility] [phone]   [facility] [phone]   General acute hospital [phone]   [facility] [phone]   [facility] [phone]   [facility] [phone]   [facility] [phone]   [facility] [phone]

## 2024-10-01 NOTE — ED PROVIDER NOTES
Final diagnoses:   Atrial fib/flutter, transient (HCC)   V-tach (HCC)     ED Disposition       ED Disposition   Admit    Condition   Stable    Date/Time   Mon Sep 30, 2024 11:22 PM    Comment   Case was discussed with Narda (CC) and the patient's admission status was agreed to be Admission Status: inpatient status to the service of Dr. Salazar .               Assessment & Plan   {Hyperlinks  Risk Stratification - NIHSS - HEART SCORE - Fill out sepsis note and make sure you call 5555 if severe or septic shock:[id number]}    Medical Decision Making  Amount and/or Complexity of Data Reviewed  Labs: ordered. Decision-making details documented in ED Course.  Radiology: ordered.    Risk  OTC drugs.  Prescription drug management.  Decision regarding hospitalization.        ED Course as of 09/30/24 2325   Mon Sep 30, 2024   1943 TSH 3RD GENERATON(!): 10049   2058 Discussed case with critical care, Narda.  He advises discussing case with cardiology.  I discussed with cardiology, Jennifer. He advised repeat dose amio (given and failed), then procainamide. If still fails he recommends cardioversion.        Medications   sacubitril-valsartan (ENTRESTO) 49-51 MG per tablet 1 tablet (1 tablet Oral Given 9/30/24 1945)   procainamide (PRONESTYL) 1,000 mg in sodium chloride 0.9 % 250 mL IV infusion (1 mg/min Intravenous New Bag 9/30/24 2121)   amiodarone (CORDARONE) 900 mg in dextrose 5 % 500 mL infusion (has no administration in time range)   metoprolol (LOPRESSOR) injection 5 mg (5 mg Intravenous Given 9/30/24 1845)   aspirin chewable tablet 324 mg (324 mg Oral Given 9/30/24 1850)   sodium chloride 0.9 % bolus 1,000 mL (0 mL Intravenous Stopped 9/30/24 1958)   diltiazem (CARDIZEM) injection 15 mg (15 mg Intravenous Given 9/30/24 1920)   amiodarone 150 mg in dextrose 5 % 100 mL IV bolus (0 mg Intravenous Stopped 9/30/24 1954)   amiodarone 150 mg in dextrose 5 % 100 mL IV bolus (0 mg Intravenous Stopped 9/30/24 2101)    furosemide (LASIX) injection 40 mg (40 mg Intravenous Given 24)       ED Risk Strat Scores                           SBIRT 22yo+      Flowsheet Row Most Recent Value   Initial Alcohol Screen: US AUDIT-C     1. How often do you have a drink containing alcohol? 0 Filed at: 2024   2. How many drinks containing alcohol do you have on a typical day you are drinking?  0 Filed at: 2024   3a. Male UNDER 65: How often do you have five or more drinks on one occasion? 0 Filed at: 2024   Audit-C Score 0 Filed at: 2024   LIGIA: How many times in the past year have you...    Used an illegal drug or used a prescription medication for non-medical reasons? Never Filed at: 2024                            History of Present Illness   {Hyperlinks  History (Med, Surg, Fam, Social) - Current Medications - Allergies  :[id number]}    Chief Complaint   Patient presents with    Shortness of Breath     Pt arrives with EMS from urgent care with c/o SOB since Saturday. Pt has hx of MI in 2018, has pacer/defib placed. Denies CP. Per EMS EKG was abnormal at , which prompted them to summon EMS for transport to hospital for further eval       Past Medical History:   Diagnosis Date    Arthritis     Cardiac arrest (HCC) 2018    Coronary artery disease     Diabetes (HCC)     Hyperlipidemia     Hypertension     Renal disorder       Past Surgical History:   Procedure Laterality Date    CARDIAC CATHETERIZATION      CARDIAC DEFIBRILLATOR PLACEMENT        History reviewed. No pertinent family history.   Social History     Tobacco Use    Smoking status: Former     Current packs/day: 0.00     Average packs/day: 0.3 packs/day for 30.0 years (7.5 ttl pk-yrs)     Types: Cigarettes     Start date: 1988     Quit date: 2018     Years since quittin.8    Smokeless tobacco: Never   Vaping Use    Vaping status: Never Used   Substance Use Topics    Alcohol use: No    Drug use: No       E-Cigarette/Vaping    E-Cigarette Use Never User       E-Cigarette/Vaping Substances    Nicotine No     THC No     CBD No     Flavoring No     Other No     Unknown No       I have reviewed and agree with the history as documented.     HPI    Review of Systems        Objective   {Hyperlinks  Historical Vitals - Historical Labs - Chart Review/Microbiology - Last Echo - Code Status  :[id number]}    ED Triage Vitals   Temperature Pulse Blood Pressure Respirations SpO2 Patient Position - Orthostatic VS   09/30/24 1828 09/30/24 1823 09/30/24 1823 09/30/24 1823 09/30/24 1823 09/30/24 1823   98.2 °F (36.8 °C) (!) 141 112/84 20 95 % Sitting      Temp Source Heart Rate Source BP Location FiO2 (%) Pain Score    09/30/24 1828 09/30/24 1823 09/30/24 1823 -- --    Oral Monitor Right arm        Vitals      Date and Time Temp Pulse SpO2 Resp BP Pain Score FACES Pain Rating User   09/30/24 2245 -- 138 97 % 21 102/64 -- -- VMW   09/30/24 2230 -- 140 95 % 20 120/67 -- -- VMW   09/30/24 2200 -- 137 93 % 19 114/87 -- -- VMW   09/30/24 2145 -- 137 93 % 18 109/81 -- -- VMW   09/30/24 2115 -- 137 92 % 19 113/92 -- -- VMW   09/30/24 2100 -- 140 95 % 20 111/75 -- -- VMW   09/30/24 2045 -- 140 93 % 19 116/89 -- -- VMW   09/30/24 2035 -- 140 94 % 17 103/76 -- -- VMW   09/30/24 2030 -- 140 93 % 19 94/76 -- -- VMW   09/30/24 2015 -- 140 92 % 24 113/88 -- -- VMW   09/30/24 1945 -- 141 93 % 19 118/91 -- -- VMW   09/30/24 1915 -- 138 95 % 18 119/79 -- -- VMW   09/30/24 1900 -- 140 95 % 19 119/79 -- -- VMW   09/30/24 1828 98.2 °F (36.8 °C) -- -- -- -- -- -- VMW   09/30/24 1823 -- 141 95 % 20 112/84 -- -- VMW            Physical Exam    Results Reviewed       Procedure Component Value Units Date/Time    HS Troponin I 4hr [[id number]]  (Normal) Collected: 09/30/24 2250    Lab Status: Final result Specimen: Blood from Arm, Right Updated: 09/30/24 2321     hs TnI 4hr 39 ng/L      Delta 4hr hsTnI 11 ng/L     T4, free [[id number]]  (Normal) Collected:  "09/30/24 1850    Lab Status: Final result Specimen: Blood from Arm, Right Updated: 09/30/24 2242     Free T4 1.09 ng/dL     Narrative:        \"Therapeutic range for patients medicated with thyroid disorders: 0.61-1.24 ng/dL.\"    HS Troponin I 2hr [[id number]]  (Normal) Collected: 09/30/24 2146    Lab Status: Final result Specimen: Blood from Arm, Right Updated: 09/30/24 2218     hs TnI 2hr 40 ng/L      Delta 2hr hsTnI 12 ng/L     HS Troponin 0hr (reflex protocol) [[id number]]  (Normal) Collected: 09/30/24 1849    Lab Status: Final result Specimen: Blood from Arm, Right Updated: 09/30/24 2137     hs TnI 0hr 28 ng/L     Magnesium [[id number]]  (Normal) Collected: 09/30/24 1850    Lab Status: Final result Specimen: Blood from Arm, Right Updated: 09/30/24 2028     Magnesium 2.3 mg/dL     B-Type Natriuretic Peptide(BNP) [[id number]]  (Abnormal) Collected: 09/30/24 1849    Lab Status: Final result Specimen: Blood from Arm, Right Updated: 09/30/24 2028     BNP 1,107 pg/mL     TSH, 3rd generation with Free T4 reflex [[id number]]  (Abnormal) Collected: 09/30/24 1850    Lab Status: Final result Specimen: Blood from Arm, Right Updated: 09/30/24 1934     TSH 3RD GENERATON 10.049 uIU/mL     Comprehensive metabolic panel [[id number]]  (Abnormal) Collected: 09/30/24 1849    Lab Status: Final result Specimen: Blood from Arm, Right Updated: 09/30/24 1926     Sodium 138 mmol/L      Potassium 3.4 mmol/L      Chloride 112 mmol/L      CO2 19 mmol/L      ANION GAP 7 mmol/L      BUN 19 mg/dL      Creatinine 1.03 mg/dL      Glucose 139 mg/dL      Calcium 6.7 mg/dL      Corrected Calcium 7.4 mg/dL      AST 41 U/L      ALT 58 U/L      Alkaline Phosphatase 57 U/L      Total Protein 6.1 g/dL      Albumin 3.1 g/dL      Total Bilirubin 0.42 mg/dL      eGFR 77 ml/min/1.73sq m     Narrative:      National Kidney Disease Foundation guidelines for Chronic Kidney Disease (CKD):     Stage 1 with normal or high GFR (GFR > 90 mL/min/1.73 square meters)    " Stage 2 Mild CKD (GFR = 60-89 mL/min/1.73 square meters)    Stage 3A Moderate CKD (GFR = 45-59 mL/min/1.73 square meters)    Stage 3B Moderate CKD (GFR = 30-44 mL/min/1.73 square meters)    Stage 4 Severe CKD (GFR = 15-29 mL/min/1.73 square meters)    Stage 5 End Stage CKD (GFR <15 mL/min/1.73 square meters)  Note: GFR calculation is accurate only with a steady state creatinine    CBC and differential [[id number]]  (Abnormal) Collected: 09/30/24 1849    Lab Status: Final result Specimen: Blood from Arm, Right Updated: 09/30/24 1858     WBC 9.94 Thousand/uL      RBC 4.68 Million/uL      Hemoglobin 12.9 g/dL      Hematocrit 40.7 %      MCV 87 fL      MCH 27.6 pg      MCHC 31.7 g/dL      RDW 15.4 %      MPV 10.1 fL      Platelets 356 Thousands/uL      nRBC 0 /100 WBCs      Segmented % 74 %      Immature Grans % 1 %      Lymphocytes % 12 %      Monocytes % 11 %      Eosinophils Relative 1 %      Basophils Relative 1 %      Absolute Neutrophils 7.37 Thousands/µL      Absolute Immature Grans 0.06 Thousand/uL      Absolute Lymphocytes 1.22 Thousands/µL      Absolute Monocytes 1.10 Thousand/µL      Eosinophils Absolute 0.14 Thousand/µL      Basophils Absolute 0.05 Thousands/µL             X-ray chest 1 view portable    (Results Pending)       Procedures    ED Medication and Procedure Management   Prior to Admission Medications   Prescriptions Last Dose Informant Patient Reported? Taking?   Nicotine 21-14-7 MG/24HR KIT   No No   Sig: Place 1 patch on the skin daily   PARoxetine (PAXIL) 10 mg tablet   Yes No   Sig: Take 10 mg by mouth daily   aspirin 81 mg chewable tablet   No No   Sig: Chew 1 tablet (81 mg total) daily   atorvastatin (LIPITOR) 40 mg tablet   No No   Sig: Take 1 tablet (40 mg total) by mouth daily with dinner   clopidogrel (PLAVIX) 75 mg tablet   Yes No   Sig: Take 75 mg by mouth daily   digoxin (DIGITEK) 0.125 mg tablet   Yes No   Sig: Take 125 mcg by mouth daily   furosemide (LASIX) 20 mg tablet   No No    Sig: Take 1 tablet (20 mg total) by mouth daily   lisinopril (ZESTRIL) 10 mg tablet   No No   Sig: Take 1 tablet (10 mg total) by mouth daily   metFORMIN (FORTAMET) 1000 MG (OSM) 24 hr tablet   No No   Sig: Take 1 tablet (1,000 mg total) by mouth daily with breakfast   metoprolol succinate (TOPROL-XL) 100 mg 24 hr tablet   No No   Sig: Take 1 tablet (100 mg total) by mouth daily   Patient taking differently: Take 25 mg by mouth daily    potassium chloride (K-DUR) 10 mEq tablet   Yes No   Sig: Take 10 mEq by mouth 2 (two) times a day   tamsulosin (FLOMAX) 0.4 mg   No No   Sig: Take 1 capsule (0.4 mg total) by mouth daily with dinner      Facility-Administered Medications: None     Patient's Medications   Discharge Prescriptions    No medications on file     No discharge procedures on file.  ED SEPSIS DOCUMENTATION   Time reflects when diagnosis was documented in both MDM as applicable and the Disposition within this note       Time User Action Codes Description Comment    9/30/2024  6:47 PM Desi Sands Add [I48.91,  I48.92] Atrial fib/flutter, transient (HCC)     9/30/2024 11:22 PM Desi Sands Add [I47.20] V-tach ([facility])                U/L      ALT 63 U/L      Alkaline Phosphatase 65 U/L      Total Protein 7.0 g/dL      Albumin 3.5 g/dL      Total Bilirubin 0.62 mg/dL      eGFR 62 ml/min/1.73sq m     Narrative:      verified by repeat analysis.  National Kidney Disease Foundation guidelines for Chronic Kidney Disease (CKD):     Stage 1 with normal or high GFR (GFR > 90 mL/min/1.73 square meters)    Stage 2 Mild CKD (GFR = 60-89 mL/min/1.73 square meters)    Stage 3A Moderate CKD (GFR = 45-59 mL/min/1.73 square meters)    Stage 3B Moderate CKD (GFR = 30-44 mL/min/1.73 square meters)    Stage 4 Severe CKD (GFR = 15-29 mL/min/1.73 square meters)    Stage 5 End Stage CKD (GFR <15 mL/min/1.73 square meters)  Note: GFR calculation is accurate only with a steady state creatinine    Magnesium [[id number]]  (Normal) Collected: 10/01/24 0432    Lab Status: Final result Specimen: Blood from Arm, Right Updated: 10/01/24 0509     Magnesium 2.6 mg/dL     Narrative:      verified by repeat analysis.    Phosphorus [[id number]]  (Normal) Collected: 10/01/24 0432    Lab Status: Final result Specimen: Blood from Arm, Right Updated: 10/01/24 0509     Phosphorus 3.0 mg/dL     Narrative:      verified by repeat analysis.    CBC and differential [[id number]]  (Abnormal) Collected: 10/01/24 0432    Lab Status: Final result Specimen: Blood from Arm, Right Updated: 10/01/24 0438     WBC 9.78 Thousand/uL      RBC 4.52 Million/uL      Hemoglobin 12.6 g/dL      Hematocrit 39.1 %      MCV 87 fL      MCH 27.9 pg      MCHC 32.2 g/dL      RDW 15.6 %      MPV 10.0 fL      Platelets 355 Thousands/uL      nRBC 0 /100 WBCs      Segmented % 78 %      Immature Grans % 1 %      Lymphocytes % 11 %      Monocytes % 10 %      Eosinophils Relative 0 %      Basophils Relative 0 %      Absolute Neutrophils 7.54 Thousands/µL      Absolute Immature Grans 0.06 Thousand/uL      Absolute Lymphocytes 1.09 Thousands/µL      Absolute Monocytes 1.02 Thousand/µL      Eosinophils Absolute 0.04 Thousand/µL   "    Basophils Absolute 0.03 Thousands/µL     Calcium, ionized [[id number]]  (Normal) Collected: 10/01/24 0432    Lab Status: Final result Specimen: Blood from Arm, Right Updated: 10/01/24 0438     Calcium, Ionized 1.16 mmol/L     Magnesium [[id number]]  (Normal) Collected: 10/01/24 0038    Lab Status: Final result Specimen: Blood from Arm, Right Updated: 10/01/24 0106     Magnesium 2.5 mg/dL     Calcium, ionized [[id number]]  (Normal) Collected: 10/01/24 0038    Lab Status: Final result Specimen: Blood from Arm, Right Updated: 10/01/24 0053     Calcium, Ionized 1.17 mmol/L     Platelet count [[id number]]  (Abnormal) Collected: 10/01/24 0038    Lab Status: Final result Specimen: Blood from Arm, Right Updated: 10/01/24 0053     Platelets 400 Thousands/uL      MPV 10.0 fL     HS Troponin I 4hr [[id number]]  (Normal) Collected: 09/30/24 2250    Lab Status: Final result Specimen: Blood from Arm, Right Updated: 09/30/24 2321     hs TnI 4hr 39 ng/L      Delta 4hr hsTnI 11 ng/L     T4, free [[id number]]  (Normal) Collected: 09/30/24 1850    Lab Status: Final result Specimen: Blood from Arm, Right Updated: 09/30/24 2242     Free T4 1.09 ng/dL     Narrative:        \"Therapeutic range for patients medicated with thyroid disorders: 0.61-1.24 ng/dL.\"    HS Troponin I 2hr [[id number]]  (Normal) Collected: 09/30/24 2146    Lab Status: Final result Specimen: Blood from Arm, Right Updated: 09/30/24 2218     hs TnI 2hr 40 ng/L      Delta 2hr hsTnI 12 ng/L     HS Troponin 0hr (reflex protocol) [[id number]]  (Normal) Collected: 09/30/24 1849    Lab Status: Final result Specimen: Blood from Arm, Right Updated: 09/30/24 2137     hs TnI 0hr 28 ng/L     Magnesium [[id number]]  (Normal) Collected: 09/30/24 1850    Lab Status: Final result Specimen: Blood from Arm, Right Updated: 09/30/24 2028     Magnesium 2.3 mg/dL     B-Type Natriuretic Peptide(BNP) [[id number]]  (Abnormal) Collected: 09/30/24 1849    Lab Status: Final result Specimen: Blood from " Arm, Right Updated: 09/30/24 2028     BNP 1,107 pg/mL     TSH, 3rd generation with Free T4 reflex [[id number]]  (Abnormal) Collected: 09/30/24 1850    Lab Status: Final result Specimen: Blood from Arm, Right Updated: 09/30/24 1934     TSH 3RD GENERATON 10.049 uIU/mL     Comprehensive metabolic panel [[id number]]  (Abnormal) Collected: 09/30/24 1849    Lab Status: Final result Specimen: Blood from Arm, Right Updated: 09/30/24 1926     Sodium 138 mmol/L      Potassium 3.4 mmol/L      Chloride 112 mmol/L      CO2 19 mmol/L      ANION GAP 7 mmol/L      BUN 19 mg/dL      Creatinine 1.03 mg/dL      Glucose 139 mg/dL      Calcium 6.7 mg/dL      Corrected Calcium 7.4 mg/dL      AST 41 U/L      ALT 58 U/L      Alkaline Phosphatase 57 U/L      Total Protein 6.1 g/dL      Albumin 3.1 g/dL      Total Bilirubin 0.42 mg/dL      eGFR 77 ml/min/1.73sq m     Narrative:      National Kidney Disease Foundation guidelines for Chronic Kidney Disease (CKD):     Stage 1 with normal or high GFR (GFR > 90 mL/min/1.73 square meters)    Stage 2 Mild CKD (GFR = 60-89 mL/min/1.73 square meters)    Stage 3A Moderate CKD (GFR = 45-59 mL/min/1.73 square meters)    Stage 3B Moderate CKD (GFR = 30-44 mL/min/1.73 square meters)    Stage 4 Severe CKD (GFR = 15-29 mL/min/1.73 square meters)    Stage 5 End Stage CKD (GFR <15 mL/min/1.73 square meters)  Note: GFR calculation is accurate only with a steady state creatinine    CBC and differential [[id number]]  (Abnormal) Collected: 09/30/24 1849    Lab Status: Final result Specimen: Blood from Arm, Right Updated: 09/30/24 1858     WBC 9.94 Thousand/uL      RBC 4.68 Million/uL      Hemoglobin 12.9 g/dL      Hematocrit 40.7 %      MCV 87 fL      MCH 27.6 pg      MCHC 31.7 g/dL      RDW 15.4 %      MPV 10.1 fL      Platelets 356 Thousands/uL      nRBC 0 /100 WBCs      Segmented % 74 %      Immature Grans % 1 %      Lymphocytes % 12 %      Monocytes % 11 %      Eosinophils Relative 1 %      Basophils Relative 1  %      Absolute Neutrophils 7.37 Thousands/µL      Absolute Immature Grans 0.06 Thousand/uL      Absolute Lymphocytes 1.22 Thousands/µL      Absolute Monocytes 1.10 Thousand/µL      Eosinophils Absolute 0.14 Thousand/µL      Basophils Absolute 0.05 Thousands/µL             X-ray chest 1 view portable   Final Interpretation by Luz Marina Dixon MD (10/01 0712)      Moderate pulmonary edema.            Workstation performed: [id number]             ECG 12 Lead Documentation Only    Date/Time: 9/30/2024 11:06 PM    Performed by: Desi Sands DO  Authorized by: Desi Sands DO    Indications / Diagnosis:  Abnormal ekg  ECG reviewed by me, the ED Provider: yes    Patient location:  ED  Previous ECG:     Previous ECG:  Compared to current    Similarity:  Changes noted    Comparison to cardiac monitor: Yes    Interpretation:     Interpretation: abnormal    Rate:     ECG rate assessment: tachycardic    Rhythm:     Rhythm comment:  Slow v tach  Ectopy:     Ectopy: none    QRS:     QRS axis:  Normal    QRS intervals:  Wide  Conduction:     Conduction: abnormal      Abnormal conduction: bifascicular block    ST segments:     ST segments:  Non-specific  T waves:     T waves: non-specific    CriticalCare Time    Date/Time: 9/30/2024 11:07 PM    Performed by: Desi Sands DO  Authorized by: Desi Sands DO    Critical care provider statement:     Critical care time (minutes):  80    Critical care time was exclusive of:  Separately billable procedures and treating other patients and teaching time    Critical care was necessary to treat or prevent imminent or life-threatening deterioration of the following conditions:  Circulatory failure    Critical care was time spent personally by me on the following activities:  Obtaining history from patient or surrogate, development of treatment plan with patient or surrogate, discussions with consultants, evaluation of patient's response to  treatment, examination of patient, interpretation of cardiac output measurements, ordering and performing treatments and interventions, ordering and review of laboratory studies, ordering and review of radiographic studies and re-evaluation of patient's condition      ED Medication and Procedure Management   Prior to Admission Medications   Prescriptions Last Dose Informant Patient Reported? Taking?   Nicotine 21-14-7 MG/24HR KIT Not Taking  No No   Sig: Place 1 patch on the skin daily   Patient not taking: Reported on 10/1/2024   PARoxetine (PAXIL) 10 mg tablet Past Month  Yes Yes   Sig: Take 10 mg by mouth daily   aspirin 81 mg chewable tablet 9/30/2024  No Yes   Sig: Chew 1 tablet (81 mg total) daily   atorvastatin (LIPITOR) 40 mg tablet 9/30/2024  No Yes   Sig: Take 1 tablet (40 mg total) by mouth daily with dinner   clopidogrel (PLAVIX) 75 mg tablet 9/30/2024  Yes Yes   Sig: Take 75 mg by mouth daily   digoxin (DIGITEK) 0.125 mg tablet 9/30/2024  Yes Yes   Sig: Take 125 mcg by mouth daily   furosemide (LASIX) 20 mg tablet 9/30/2024  No Yes   Sig: Take 1 tablet (20 mg total) by mouth daily   lisinopril (ZESTRIL) 10 mg tablet 9/30/2024  No Yes   Sig: Take 1 tablet (10 mg total) by mouth daily   metFORMIN (FORTAMET) 1000 MG (OSM) 24 hr tablet 9/30/2024  No Yes   Sig: Take 1 tablet (1,000 mg total) by mouth daily with breakfast   metoprolol succinate (TOPROL-XL) 100 mg 24 hr tablet   No No   Sig: Take 1 tablet (100 mg total) by mouth daily   Patient taking differently: Take 25 mg by mouth daily    potassium chloride (K-DUR) 10 mEq tablet 9/30/2024  Yes Yes   Sig: Take 10 mEq by mouth 2 (two) times a day   tamsulosin (FLOMAX) 0.4 mg 9/30/2024  No Yes   Sig: Take 1 capsule (0.4 mg total) by mouth daily with dinner      Facility-Administered Medications: None     Discharge Medication List as of 10/2/2024 12:44 PM        START taking these medications    Details   amiodarone (PACERONE) 400 MG tablet Take 1 tablet (400  mg total) by mouth 2 (two) times a day with meals for 12 doses, Starting Wed 10/2/2024, Until Tue 10/8/2024, Normal      levothyroxine 125 mcg tablet Take 1 tablet (125 mcg total) by mouth daily in the early morning, Starting Thu 10/3/2024, No Print      metoprolol tartrate (LOPRESSOR) 25 mg tablet Take 0.5 tablets (12.5 mg total) by mouth every 12 (twelve) hours, Starting Wed 10/2/2024, No Print      sacubitril-valsartan (ENTRESTO) 49-51 MG TABS Take 1 tablet by mouth 2 (two) times a day, Starting Wed 10/2/2024, Until Sun 12/1/2024, Normal           CONTINUE these medications which have NOT CHANGED    Details   aspirin 81 mg chewable tablet Chew 1 tablet (81 mg total) daily, Starting Wed 12/5/2018, Print      atorvastatin (LIPITOR) 40 mg tablet Take 1 tablet (40 mg total) by mouth daily with dinner, Starting Tue 12/4/2018, Print      clopidogrel (PLAVIX) 75 mg tablet Take 75 mg by mouth daily, Historical Med      digoxin (DIGITEK) 0.125 mg tablet Take 125 mcg by mouth daily, Historical Med      furosemide (LASIX) 20 mg tablet Take 1 tablet (20 mg total) by mouth daily, Starting Wed 12/5/2018, Print      lisinopril (ZESTRIL) 10 mg tablet Take 1 tablet (10 mg total) by mouth daily, Starting Wed 12/5/2018, Print      metFORMIN (FORTAMET) 1000 MG (OSM) 24 hr tablet Take 1 tablet (1,000 mg total) by mouth daily with breakfast, Starting Tue 12/4/2018, Print      PARoxetine (PAXIL) 10 mg tablet Take 10 mg by mouth daily, Historical Med      potassium chloride (K-DUR) 10 mEq tablet Take 10 mEq by mouth 2 (two) times a day, Historical Med      tamsulosin (FLOMAX) 0.4 mg Take 1 capsule (0.4 mg total) by mouth daily with dinner, Starting Tue 12/4/2018, Print           STOP taking these medications       metoprolol succinate (TOPROL-XL) 100 mg 24 hr tablet Comments:   Reason for Stopping:         Nicotine 21-14-7 MG/24HR KIT Comments:   Reason for Stopping:             Outpatient Discharge Orders   Activity as tolerated     No  strenuous exercise     ED SEPSIS DOCUMENTATION   Time reflects when diagnosis was documented in both MDM as applicable and the Disposition within this note       Time User Action Codes Description Comment    9/30/2024  6:47 PM Desi Sands Add [I48.91,  I48.92] Atrial fib/flutter, transient (HCC)     9/30/2024 11:22 PM Desi Sands Add [I47.20] V-tach ([facility])     10/2/2024 11:39 AM Garcia Larsne Modify [I48.91,  I48.92] Atrial fib/flutter, transient (HCC)     10/2/2024 11:39 AM Garcia Larsen Modify [I47.20] V-tach ([facility])            Initial Sepsis Screening       Row Name 10/01/24 0692                Is the patient's history suggestive of a new or worsening infection? No  -TS                  User Key  (r) = Recorded By, (t) = Taken By, (c) = Cosigned By      Initials Name Provider Type    TS LALITHA Lujan Nurse Practitioner                       Desi Sands,   10/10/24 6321

## 2024-10-01 NOTE — CONSULTS
Consultation  Name:Billy Zhang   Room: ICU 04/ICU 04     IMPRESSION:  Ventricular tachycardia.  Maximal rate below detection threshold setting of his ICD  CAD  Ischemic CM  Post ICD implant    PLAN:  Echocardiogram  Cardioversion  DC Procan  Resume amiodarone at 400 mg PO BID    HPI:  Billy Zhang is a 64 y.o. who presents with a past medical history of cardiac arrest, CAD with PCI to LAD, ischemic cardiomyopathy with AICD placement, DM, HLD, anxiety, hypothyroidism.  He presents to the emergency department for evaluation of fatigue and shortness of breath since Friday.  He states that previous to that he was feeling in his normal state of health.  He denies and chest pain.  He did feel palpitations which he attributed to his anxiety.  In the emergency department, he was noted to be in a ventricular arrhythmia.  He was started on amiodarone which failed to susanne the rhythm.  He was transitioned to a procainamide gtt, but VT has persisted overnight.    ?    Scheduled Medicines  Current Facility-Administered Medications   Medication Dose Route Frequency Provider Last Rate    amiodarone (CORDARONE) 900 mg in dextrose 5 % 500 mL infusion  1 mg/min Intravenous Continuous LALITHA Lujan      amiodarone  400 mg Oral BID With Meals LALITHA Lujan      aspirin  81 mg Oral Daily LALITHA Ruvalcaba      chlorhexidine  15 mL Mouth/Throat Q12H LALITHA Harris      clopidogrel  75 mg Oral Daily LALITHA Ruvalcaba      furosemide  40 mg Intravenous Once LALITHA Lujan      [START ON 10/2/2024] furosemide  20 mg Oral Daily LALITHA Lujan      heparin (porcine)  5,000 Units Subcutaneous Q8H LALITHA Harris      insulin lispro  1-6 Units Subcutaneous Q6H LALITHA Harris      levothyroxine  125 mcg Oral Early Morning LALITHA Ruvalcaba      metoprolol tartrate  12.5 mg Oral Q12H LALITHA Mc      PARoxetine  10  "mg Oral Daily LALITHA Ruvalcaba      sacubitril-valsartan  1 tablet Oral BID LALITHA Lujan        Infusions  amiodarone (CORDARONE) 900 mg in dextrose 5 % 500 mL infusion, 1 mg/min       PRN Medicines     No Known Allergies   Diet Eze/CHO Controlled; Consistent Carbohydrate Diet Level 2 (5 carb servings/75 grams CHO/meal)    Intake/Output Summary (Last 24 hours) at 10/1/2024 1020  Last data filed at 10/1/2024 0951  Gross per 24 hour   Intake 1626.42 ml   Output 1170 ml   Net 456.42 ml      /91   Pulse 69   Temp 98.2 °F (36.8 °C) (Oral)   Resp 21   Ht 5' 11\" (1.803 m)   Wt 103 kg (227 lb)   SpO2 98%   BMI 31.66 kg/m²    BP Readings from Last 3 Encounters:   10/01/24 110/91   11/12/19 117/72   12/05/18 126/80     ?  Physical Exam:  General Appearance:  Alert, cooperative, in no acute distress   Eyes:  Sclerae anicteric.    HEET:  Normocephalic, atraumatic.    Neck: Supple, no JVD   Cardiovascular:  Normal rate, regular rhythm. No murmurs   No edema, normal pulses   Respiratory:  Breathing unlabored. Lungs clear to auscultation   Gastrointestinal:  Normoactive bowel sounds. Abdomen soft, nontender to palpation.   Musculoskeletal: No back or joint deformity.   Dermatologic:  Skin is warm and dry.   Neurologic: Alert and oriented and neurologic exam is grossly normal.    Psychiatric: Normal affect and mood.    LABS:  Lab Results   Component Value Date    GLUCOSE 332 (H) 11/22/2018    BUN 21 10/01/2024    CREATININE 1.23 10/01/2024    CALCIUM 8.5 10/01/2024    K 4.6 10/01/2024    CO2 21 10/01/2024     10/01/2024    ALKPHOS 65 10/01/2024    AST 46 (H) 10/01/2024    ALT 63 (H) 10/01/2024     Lab Results   Component Value Date    WBC 9.78 10/01/2024    HGB 12.6 10/01/2024    HCT 39.1 10/01/2024    MCV 87 10/01/2024     10/01/2024     Lab Results   Component Value Date    HDL 37 (L) 11/22/2018    LDLCALC 81 11/22/2018    TRIG 117 11/22/2018     ?    ?  ?  ?  Joseluis Rodriguez MD  "

## 2024-10-02 VITALS
TEMPERATURE: 98 F | HEIGHT: 71 IN | WEIGHT: 227 LBS | OXYGEN SATURATION: 92 % | BODY MASS INDEX: 31.78 KG/M2 | SYSTOLIC BLOOD PRESSURE: 101 MMHG | RESPIRATION RATE: 20 BRPM | HEART RATE: 65 BPM | DIASTOLIC BLOOD PRESSURE: 66 MMHG

## 2024-10-02 LAB
ALBUMIN SERPL BCG-MCNC: 3.3 G/DL (ref 3.5–5)
ALP SERPL-CCNC: 63 U/L (ref 34–104)
ALT SERPL W P-5'-P-CCNC: 50 U/L (ref 7–52)
ANION GAP SERPL CALCULATED.3IONS-SCNC: 7 MMOL/L (ref 4–13)
AST SERPL W P-5'-P-CCNC: 47 U/L (ref 13–39)
BASOPHILS # BLD AUTO: 0.04 THOUSANDS/ÂΜL (ref 0–0.1)
BASOPHILS NFR BLD AUTO: 1 % (ref 0–1)
BILIRUB SERPL-MCNC: 0.55 MG/DL (ref 0.2–1)
BUN SERPL-MCNC: 23 MG/DL (ref 5–25)
CA-I BLD-SCNC: 1.1 MMOL/L (ref 1.12–1.32)
CALCIUM ALBUM COR SERPL-MCNC: 8.7 MG/DL (ref 8.3–10.1)
CALCIUM SERPL-MCNC: 8.1 MG/DL (ref 8.4–10.2)
CHLORIDE SERPL-SCNC: 106 MMOL/L (ref 96–108)
CO2 SERPL-SCNC: 21 MMOL/L (ref 21–32)
CREAT SERPL-MCNC: 1.14 MG/DL (ref 0.6–1.3)
EOSINOPHIL # BLD AUTO: 0.22 THOUSAND/ÂΜL (ref 0–0.61)
EOSINOPHIL NFR BLD AUTO: 3 % (ref 0–6)
ERYTHROCYTE [DISTWIDTH] IN BLOOD BY AUTOMATED COUNT: 15.6 % (ref 11.6–15.1)
GFR SERPL CREATININE-BSD FRML MDRD: 68 ML/MIN/1.73SQ M
GLUCOSE SERPL-MCNC: 129 MG/DL (ref 65–140)
GLUCOSE SERPL-MCNC: 153 MG/DL (ref 65–140)
GLUCOSE SERPL-MCNC: 166 MG/DL (ref 65–140)
HCT VFR BLD AUTO: 37.6 % (ref 36.5–49.3)
HGB BLD-MCNC: 11.9 G/DL (ref 12–17)
IMM GRANULOCYTES # BLD AUTO: 0.05 THOUSAND/UL (ref 0–0.2)
IMM GRANULOCYTES NFR BLD AUTO: 1 % (ref 0–2)
LYMPHOCYTES # BLD AUTO: 1.2 THOUSANDS/ÂΜL (ref 0.6–4.47)
LYMPHOCYTES NFR BLD AUTO: 15 % (ref 14–44)
MAGNESIUM SERPL-MCNC: 2.3 MG/DL (ref 1.9–2.7)
MCH RBC QN AUTO: 27.6 PG (ref 26.8–34.3)
MCHC RBC AUTO-ENTMCNC: 31.6 G/DL (ref 31.4–37.4)
MCV RBC AUTO: 87 FL (ref 82–98)
MONOCYTES # BLD AUTO: 0.96 THOUSAND/ÂΜL (ref 0.17–1.22)
MONOCYTES NFR BLD AUTO: 12 % (ref 4–12)
NEUTROPHILS # BLD AUTO: 5.81 THOUSANDS/ÂΜL (ref 1.85–7.62)
NEUTS SEG NFR BLD AUTO: 68 % (ref 43–75)
NRBC BLD AUTO-RTO: 0 /100 WBCS
PHOSPHATE SERPL-MCNC: 2.7 MG/DL (ref 2.3–4.1)
PLATELET # BLD AUTO: 355 THOUSANDS/UL (ref 149–390)
PMV BLD AUTO: 9.8 FL (ref 8.9–12.7)
POTASSIUM SERPL-SCNC: 4.1 MMOL/L (ref 3.5–5.3)
PROT SERPL-MCNC: 6.7 G/DL (ref 6.4–8.4)
RBC # BLD AUTO: 4.31 MILLION/UL (ref 3.88–5.62)
SODIUM SERPL-SCNC: 134 MMOL/L (ref 135–147)
WBC # BLD AUTO: 8.28 THOUSAND/UL (ref 4.31–10.16)

## 2024-10-02 PROCEDURE — 80053 COMPREHEN METABOLIC PANEL: CPT

## 2024-10-02 PROCEDURE — 85025 COMPLETE CBC W/AUTO DIFF WBC: CPT

## 2024-10-02 PROCEDURE — 83735 ASSAY OF MAGNESIUM: CPT

## 2024-10-02 PROCEDURE — 84100 ASSAY OF PHOSPHORUS: CPT | Performed by: NURSE PRACTITIONER

## 2024-10-02 PROCEDURE — 99238 HOSP IP/OBS DSCHRG MGMT 30/<: CPT

## 2024-10-02 PROCEDURE — 82330 ASSAY OF CALCIUM: CPT

## 2024-10-02 PROCEDURE — 82948 REAGENT STRIP/BLOOD GLUCOSE: CPT

## 2024-10-02 PROCEDURE — NC001 PR NO CHARGE: Performed by: ANESTHESIOLOGY

## 2024-10-02 RX ORDER — AMIODARONE HYDROCHLORIDE 400 MG/1
400 TABLET ORAL 2 TIMES DAILY WITH MEALS
Qty: 30 TABLET | Refills: 0 | Status: SHIPPED | OUTPATIENT
Start: 2024-10-02 | End: 2024-10-08

## 2024-10-02 RX ORDER — LEVOTHYROXINE SODIUM 125 UG/1
125 TABLET ORAL
Start: 2024-10-03

## 2024-10-02 RX ORDER — FUROSEMIDE 10 MG/ML
40 INJECTION INTRAMUSCULAR; INTRAVENOUS ONCE
Status: COMPLETED | OUTPATIENT
Start: 2024-10-02 | End: 2024-10-02

## 2024-10-02 RX ORDER — METOPROLOL TARTRATE 25 MG/1
12.5 TABLET, FILM COATED ORAL EVERY 12 HOURS SCHEDULED
Start: 2024-10-02

## 2024-10-02 RX ORDER — FUROSEMIDE 20 MG
20 TABLET ORAL DAILY
Status: DISCONTINUED | OUTPATIENT
Start: 2024-10-03 | End: 2024-10-02 | Stop reason: HOSPADM

## 2024-10-02 RX ADMIN — FUROSEMIDE 40 MG: 10 INJECTION, SOLUTION INTRAMUSCULAR; INTRAVENOUS at 06:29

## 2024-10-02 RX ADMIN — Medication 12.5 MG: at 08:46

## 2024-10-02 RX ADMIN — PAROXETINE 10 MG: 20 TABLET, FILM COATED ORAL at 08:47

## 2024-10-02 RX ADMIN — AMIODARONE HYDROCHLORIDE 1 MG/MIN: 50 INJECTION, SOLUTION INTRAVENOUS at 01:12

## 2024-10-02 RX ADMIN — INSULIN LISPRO 1 UNITS: 100 INJECTION, SOLUTION INTRAVENOUS; SUBCUTANEOUS at 12:35

## 2024-10-02 RX ADMIN — CHLORHEXIDINE GLUCONATE 0.12% ORAL RINSE 15 ML: 1.2 LIQUID ORAL at 08:50

## 2024-10-02 RX ADMIN — CLOPIDOGREL 75 MG: 75 TABLET ORAL at 08:47

## 2024-10-02 RX ADMIN — SACUBITRIL AND VALSARTAN 1 TABLET: 49; 51 TABLET, FILM COATED ORAL at 08:48

## 2024-10-02 RX ADMIN — INSULIN LISPRO 1 UNITS: 100 INJECTION, SOLUTION INTRAVENOUS; SUBCUTANEOUS at 08:50

## 2024-10-02 RX ADMIN — LEVOTHYROXINE SODIUM 125 MCG: 125 TABLET ORAL at 05:43

## 2024-10-02 RX ADMIN — POTASSIUM & SODIUM PHOSPHATES POWDER PACK 280-160-250 MG 2 PACKET: 280-160-250 PACK at 08:50

## 2024-10-02 RX ADMIN — ASPIRIN 81 MG: 81 TABLET, CHEWABLE ORAL at 08:46

## 2024-10-02 RX ADMIN — AMIODARONE HYDROCHLORIDE 400 MG: 200 TABLET ORAL at 08:47

## 2024-10-02 RX ADMIN — HEPARIN SODIUM 5000 UNITS: 5000 INJECTION INTRAVENOUS; SUBCUTANEOUS at 05:43

## 2024-10-02 NOTE — PROGRESS NOTES
Progress Note - Critical Care/ICU   Name: Billy Zhang 64 y.o. male I MRN: [id number]  Unit/Bed#: ICU 04 I Date of Admission: 9/30/2024   Date of Service: 10/2/2024 I Hospital Day: 2      Assessment & Plan  Ventricular arrhythmia  Patient has history of cardiac arrest in 2018 secondary to 100% LAD occlusion   He underwent PCI with CRUZ to LAD   EF at that time 20-25% prompting AICD placement   Presents with shortness of breath   Stable Ventricular arrhythmia noted on admission   Patient denies AICD defibrillation   TSH abnormal at 10.049.  However, free T4 within normal limits   Trops negative  AICD interrogation- NSVT, occassional a fib  Trial of amiodarone failed to convert, transitioned to procainamide   S/p Procainamide gtt    Plan:  S/p DCCV on 10/1, successfully returned to sinus rhythm  Per cardiology, to continue:  IV amio load x24hrs  PO amiodarone 400mg BID for 7 days  Lopressor 12.5 BID  Entresto as BP allows  Diuresis as BP allows  Hypokalemia noted, will replete electrolytes to maintain K > 4.0 and Mg >2.0  ECHO pending formal read  Appreciate cardiology recommendations  Cardiac arrest (HCC)  Remote history from 2018  Secondary to 100% LAD occlusion  CAD (coronary artery disease)  Had PCI to LAD in 2018   Maintained on ASA/palvix/statin/BB/entresto  Will continue ASA and plavix   Resume statin, transaminases minimally elevated  Now s/p DCCV on 10/1, returned to NSR  Will restart BB, entresto as BP allows  Transaminitis  Patient states statin relate  Atorvastatin 40 mg (home dose) has been held in the outpatient setting  Overall down trending  Lipitor restarted while inpatient  Type 2 diabetes mellitus, without long-term current use of insulin (HCC)  Lab Results   Component Value Date    HGBA1C 7.3 (H) 10/01/2024     Recent Labs     10/01/24  1221 10/01/24  1751 10/01/24  2151 10/01/24  2329   POCGLU 134 154* 124 101     Blood Sugar Average: Last 72 hrs:  (P) 138  Will hold home dose  agents  Utilize SSI coverage while in acute phase and NPO   Restarted diet, continue insulin QID  Hypokalemia  Replete to maintain >4.0  Repeat BMP to ensure correction   Acute respiratory failure with hypoxia (HCC)  Suspect 2/2 undiagnosed AV  Remains on Nasal cannula s/p DCCV  Wean as able to maintain spo2 >92%  Disposition: Med Surg with Telemetry    ICU Core Measures     A: Assess, Prevent, and Manage Pain Has pain been assessed? Yes  Need for changes to pain regimen? No   B: Both SAT/SAT  N/A   C: Choice of Sedation RASS Goal: 0 Alert and Calm  Need for changes to sedation or analgesia regimen? No   D: Delirium CAM-ICU: Negative   E: Early Mobility  Plan for early mobility? Yes   F: Family Engagement Plan for family engagement today? Yes         Prophylaxis:  VTE VTE covered by:  heparin (porcine), Subcutaneous, 5,000 Units at 10/01/24 2117       Stress Ulcer  not ordered         24 Hour Events : DC cardioversion yesterday with return to SR.  Started on amiodarone gtt and oral load.  Hemodynamically stable overnight.  No acute events   Subjective   Review of Systems: See HPI for Review of Systems    Objective :                   Vitals I/O      Most Recent Min/Max in 24hrs   Temp 98 °F (36.7 °C) Temp  Min: 98 °F (36.7 °C)  Max: 98.5 °F (36.9 °C)   Pulse 65 Pulse  Min: 65  Max: 135   Resp 20 Resp  Min: 18  Max: 21   /76 BP  Min: 83/55  Max: 136/83   O2 Sat (!) 89 % SpO2  Min: 89 %  Max: 98 %      Intake/Output Summary (Last 24 hours) at 10/2/2024 0038  Last data filed at 10/1/2024 2000  Gross per 24 hour   Intake 1763.74 ml   Output 2070 ml   Net -306.26 ml       Diet Eze/CHO Controlled; Consistent Carbohydrate Diet Level 2 (5 carb servings/75 grams CHO/meal)    Invasive Monitoring           Physical Exam   Physical Exam  Eyes:      General: Lids are normal.      Extraocular Movements: Extraocular movements intact.      Conjunctiva/sclera: Conjunctivae normal.   Skin:     General: Skin is warm and dry.    HENT:      Head: Normocephalic and atraumatic.   Neck:      Trachea: Trachea normal.   Cardiovascular:      Rate and Rhythm: Normal rate.      Pulses: Normal pulses.   Musculoskeletal:      Cervical back: Normal range of motion.      Right lower leg: No edema.      Left lower leg: No edema.   Abdominal:      Palpations: Abdomen is soft.      Tenderness: There is no abdominal tenderness.   Constitutional:       General: He is awake.      Appearance: He is overweight. He is ill-appearing.      Interventions: Nasal cannula in place.   Pulmonary:      Effort: Pulmonary effort is normal.      Breath sounds: Normal breath sounds.   Neurological:      General: No focal deficit present.      Mental Status: He is alert.      GCS: GCS eye subscore is 4. GCS verbal subscore is 5. GCS motor subscore is 6.      Sensory: Sensation is intact.          Diagnostic Studies        Lab Results: I have reviewed the following results:     Medications:  Scheduled PRN   amiodarone, 400 mg, BID With Meals  aspirin, 81 mg, Daily  atorvastatin, 40 mg, Daily With Dinner  chlorhexidine, 15 mL, Q12H JUDITH  clopidogrel, 75 mg, Daily  furosemide, 20 mg, Daily  heparin (porcine), 5,000 Units, Q8H JUDITH  insulin lispro, 1-6 Units, 4x Daily (AC & HS)  levothyroxine, 125 mcg, Early Morning  metoprolol tartrate, 12.5 mg, Q12H JUDITH  PARoxetine, 10 mg, Daily          Continuous    amiodarone (CORDARONE) 900 mg in dextrose 5 % 500 mL infusion, 1 mg/min, Last Rate: 1 mg/min (10/01/24 2000)         Labs:   CBC    Recent Labs     09/30/24  1849 10/01/24  0038 10/01/24  0432   WBC 9.94  --  9.78   HGB 12.9  --  12.6   HCT 40.7  --  39.1    400* 355     BMP    Recent Labs     09/30/24  1849 10/01/24  0432   SODIUM 138 135   K 3.4* 4.6   * 105   CO2 19* 21   AGAP 7 9   BUN 19 21   CREATININE 1.03 1.23   CALCIUM 6.7* 8.5       Coags    No recent results     Additional Electrolytes  Recent Labs     10/01/24  0038 10/01/24  0432   MG 2.5 2.6   PHOS  --   3.0   CAIONIZED 1.17 1.16          Blood Gas    No recent results  No recent results LFTs  Recent Labs     09/30/24  1849 10/01/24  0432   ALT 58* 63*   AST 41* 46*   ALKPHOS 57 65   ALB 3.1* 3.5   TBILI 0.42 0.62       Infectious  No recent results  Glucose  Recent Labs     09/30/24  1849 10/01/24  0432   GLUC 139 153*

## 2024-10-02 NOTE — CASE MANAGEMENT
Case Management Discharge Planning Note    Patient name Billy Zhang  Location ICU 04/ICU 04 MRN [id number]  : 1960 Date 10/2/2024       Current Admission Date: 2024  Current Admission Diagnosis:Ventricular arrhythmia   Patient Active Problem List    Diagnosis Date Noted Date Diagnosed    Ventricular arrhythmia 10/01/2024     Hypokalemia 10/01/2024     Acute respiratory failure with hypoxia (HCC) 10/01/2024     Urinary retention 2018     Type 2 diabetes mellitus, without long-term current use of insulin (HCC) 2018     Cardiac arrest (HCC)      Ventricular fibrillation (HCC) 2018     Anxiety 2018     STEMI (ST elevation myocardial infarction) (HCC) 2018     CAD (coronary artery disease) 2018     Transaminitis 2018       LOS (days): 2  Geometric Mean LOS (GMLOS) (days): 3.9  Days to GMLOS:2.3     OBJECTIVE:  Risk of Unplanned Readmission Score: 15.96         Current admission status: Inpatient   Preferred Pharmacy:   [id number] - [facility] [place] - [address]  [facility]  [facility] [place] [address]  Phone: [phone] Fax: [phone]    Primary Care Provider: Hugo Almonte MD    Primary Insurance: [facility] RE[facility]  Secondary Insurance:     DISCHARGE DETAILS:                                          Other Referral/Resources/Interventions Provided:  Referral Comments: Pt for d/c today to home.  No post d/c needs identified by treatment team.         Treatment Team Recommendation: Home  Discharge Destination Plan:: Home  Transport at Discharge : Family

## 2024-10-02 NOTE — PROGRESS NOTES
Progress Note   Name:Billy hZang   Room: ICU 04/ICU 04     IMPRESSION:  Ventricular tachycardia.  Maximal rate below detection threshold setting of his ICD  CAD  Ischemic CM  Post ICD implant     PLAN:  Reprogram ICD detection threshold  Amiodarone at 400 mg PO BID x 1 week, then 200 mg BIDx 2 weeks, then 200 mg daily  ?  SUBJECTIVE:  He denies problems of chest pain, shortness of breath, orthopnea, PND, palpitations, syncope, or bleeding problems.   ?  OBJECTIVE:  Scheduled Medicines  Current Facility-Administered Medications   Medication Dose Route Frequency Provider Last Rate    amiodarone (CORDARONE) 900 mg in dextrose 5 % 500 mL infusion  1 mg/min Intravenous Continuous LALITHA Lujan 1 mg/min (10/02/24 0600)    amiodarone  400 mg Oral BID With Meals LALITHA Lujan      aspirin  81 mg Oral Daily LALITHA Ruvalcaba      atorvastatin  40 mg Oral Daily With Dinner LALITHA Lujan      chlorhexidine  15 mL Mouth/Throat Q12H JUDITH LALITHA Ruvalcaba      clopidogrel  75 mg Oral Daily LALITHA Ruvalcaba      [START ON 10/3/2024] furosemide  20 mg Oral Daily LALITHA Lujan      heparin (porcine)  5,000 Units Subcutaneous Q8H [facility] LALITHA Ruvalcaba      insulin lispro  1-6 Units Subcutaneous 4x Daily (AC & HS) LALITHA Lujan      levothyroxine  125 mcg Oral Early Morning LALITHA Ruvalcaba      metoprolol tartrate  12.5 mg Oral Q12H JUDITH LALITHA Lujan      PARoxetine  10 mg Oral Daily LALITHA Ruvalcaba      potassium-sodium phosphates  2 packet Oral BID With Meals LALITHA Lujan      sacubitril-valsartan  1 tablet Oral BID LALITHA Lujan        Infusions  amiodarone (CORDARONE) 900 mg in dextrose 5 % 500 mL infusion, 1 mg/min, Last Rate: 1 mg/min (10/02/24 0600)       PRN Medicines     No Known Allergies   Diet Eze/CHO Controlled; Consistent Carbohydrate Diet Level 2 (5 carb servings/75  "grams CHO/meal)    Intake/Output Summary (Last 24 hours) at 10/2/2024 0937  Last data filed at 10/2/2024 0936  Gross per 24 hour   Intake 1780.32 ml   Output 4080 ml   Net -2299.68 ml      /83   Pulse 68   Temp 98.2 °F (36.8 °C) (Oral)   Resp 18   Ht 5' 11\" (1.803 m)   Wt 103 kg (227 lb)   SpO2 91%   BMI 31.66 kg/m²    BP Readings from Last 3 Encounters:   10/02/24 140/83   11/12/19 117/72   12/05/18 126/80     ?  Physical Exam:  General Appearance:  Alert, cooperative, in no acute distress   Eyes:  Sclerae anicteric.    HEET:  Normocephalic, atraumatic.    Neck: Supple, no JVD   Cardiovascular:  Normal rate, regular rhythm. No murmurs   No edema, normal pulses   Respiratory:  Breathing unlabored. Lungs clear to auscultation   Gastrointestinal:  Normoactive bowel sounds. Abdomen soft, nontender to palpation.   Musculoskeletal: No back or joint deformity.   Dermatologic:  Skin is warm and dry.   Neurologic: Alert and oriented and neurologic exam is grossly normal.    Psychiatric: Normal affect and mood.    LABS:  Lab Results   Component Value Date    GLUCOSE 332 (H) 11/22/2018    BUN 23 10/02/2024    CREATININE 1.14 10/02/2024    CALCIUM 8.1 (L) 10/02/2024    K 4.1 10/02/2024    CO2 21 10/02/2024     10/02/2024    ALKPHOS 63 10/02/2024    AST 47 (H) 10/02/2024    ALT 50 10/02/2024     Lab Results   Component Value Date    WBC 8.28 10/02/2024    HGB 11.9 (L) 10/02/2024    HCT 37.6 10/02/2024    MCV 87 10/02/2024     10/02/2024     Lab Results   Component Value Date    HDL 37 (L) 11/22/2018    LDLCALC 81 11/22/2018    TRIG 117 11/22/2018     ?    ?  ?  ?  Joseluis Rodriguez MD  "

## 2024-10-02 NOTE — PLAN OF CARE
Problem: Potential for Falls  Goal: Patient will remain free of falls  Description: INTERVENTIONS:  - Educate patient/family on patient safety including physical limitations  - Instruct patient to call for assistance with activity   - Consult OT/PT to assist with strengthening/mobility   - Keep Call bell within reach  - Keep bed low and locked with side rails adjusted as appropriate  - Keep care items and personal belongings within reach  - Initiate and maintain comfort rounds  - Make Fall Risk Sign visible to staff  - Offer Toileting every 2 Hours, in advance of need  - Initiate/Maintain bed alarm  - Obtain necessary fall risk management equipment:   - Apply yellow socks and bracelet for high fall risk patients  - Consider moving patient to room near nurses station  Outcome: Progressing     Problem: PAIN - ADULT  Goal: Verbalizes/displays adequate comfort level or baseline comfort level  Description: Interventions:  - Encourage patient to monitor pain and request assistance  - Assess pain using appropriate pain scale  - Administer analgesics based on type and severity of pain and evaluate response  - Implement non-pharmacological measures as appropriate and evaluate response  - Consider cultural and social influences on pain and pain management  - Notify physician/advanced practitioner if interventions unsuccessful or patient reports new pain  Outcome: Progressing     Problem: INFECTION - ADULT  Goal: Absence or prevention of progression during hospitalization  Description: INTERVENTIONS:  - Assess and monitor for signs and symptoms of infection  - Monitor lab/diagnostic results  - Monitor all insertion sites, i.e. indwelling lines, tubes, and drains  - Monitor endotracheal if appropriate and nasal secretions for changes in amount and color  - Nettie appropriate cooling/warming therapies per order  - Administer medications as ordered  - Instruct and encourage patient and family to use good hand hygiene  technique  - Identify and instruct in appropriate isolation precautions for identified infection/condition  Outcome: Progressing  Goal: Absence of fever/infection during neutropenic period  Description: INTERVENTIONS:  - Monitor WBC    Outcome: Progressing     Problem: SAFETY ADULT  Goal: Patient will remain free of falls  Description: INTERVENTIONS:  - Educate patient/family on patient safety including physical limitations  - Instruct patient to call for assistance with activity   - Consult OT/PT to assist with strengthening/mobility   - Keep Call bell within reach  - Keep bed low and locked with side rails adjusted as appropriate  - Keep care items and personal belongings within reach  - Initiate and maintain comfort rounds  - Make Fall Risk Sign visible to staff  - Offer Toileting every 2 Hours, in advance of need  - Initiate/Maintain bed alarm  - Obtain necessary fall risk management equipment:   - Apply yellow socks and bracelet for high fall risk patients  - Consider moving patient to room near nurses station  Outcome: Progressing  Goal: Maintain or return to baseline ADL function  Description: INTERVENTIONS:  -  Assess patient's ability to carry out ADLs; assess patient's baseline for ADL function and identify physical deficits which impact ability to perform ADLs (bathing, care of mouth/teeth, toileting, grooming, dressing, etc.)  - Assess/evaluate cause of self-care deficits   - Assess range of motion  - Assess patient's mobility; develop plan if impaired  - Assess patient's need for assistive devices and provide as appropriate  - Encourage maximum independence but intervene and supervise when necessary  - Involve family in performance of ADLs  - Assess for home care needs following discharge   - Consider OT consult to assist with ADL evaluation and planning for discharge  - Provide patient education as appropriate  Outcome: Progressing  Goal: Maintains/Returns to pre admission functional  level  Description: INTERVENTIONS:  - Perform AM-PAC 6 Click Basic Mobility/ Daily Activity assessment daily.  - Set and communicate daily mobility goal to care team and patient/family/caregiver.   - Collaborate with rehabilitation services on mobility goals if consulted  - Perform Range of Motion 3 times a day.  - Reposition patient every 2 hours.  - Dangle patient 3 times a day  - Stand patient 3 times a day  - Ambulate patient 3 times a day  - Out of bed to chair 3 times a day   - Out of bed for meals 3 times a day  - Out of bed for toileting  - Record patient progress and toleration of activity level   Outcome: Progressing     Problem: DISCHARGE PLANNING  Goal: Discharge to home or other facility with appropriate resources  Description: INTERVENTIONS:  - Identify barriers to discharge w/patient and caregiver  - Arrange for needed discharge resources and transportation as appropriate  - Identify discharge learning needs (meds, wound care, etc.)  - Arrange for interpretive services to assist at discharge as needed  - Refer to Case Management Department for coordinating discharge planning if the patient needs post-hospital services based on physician/advanced practitioner order or complex needs related to functional status, cognitive ability, or social support system  Outcome: Progressing     Problem: Knowledge Deficit  Goal: Patient/family/caregiver demonstrates understanding of disease process, treatment plan, medications, and discharge instructions  Description: Complete learning assessment and assess knowledge base.  Interventions:  - Provide teaching at level of understanding  - Provide teaching via preferred learning methods  Outcome: Progressing     Problem: CARDIOVASCULAR - ADULT  Goal: Maintains optimal cardiac output and hemodynamic stability  Description: INTERVENTIONS:  - Monitor I/O, vital signs and rhythm  - Monitor for S/S and trends of decreased cardiac output  - Administer and titrate ordered  vasoactive medications to optimize hemodynamic stability  - Assess quality of pulses, skin color and temperature  - Assess for signs of decreased coronary artery perfusion  - Instruct patient to report change in severity of symptoms  Outcome: Progressing  Goal: Absence of cardiac dysrhythmias or at baseline rhythm  Description: INTERVENTIONS:  - Continuous cardiac monitoring, vital signs, obtain 12 lead EKG if ordered  - Administer antiarrhythmic and heart rate control medications as ordered  - Monitor electrolytes and administer replacement therapy as ordered  Outcome: Progressing     Problem: METABOLIC, FLUID AND ELECTROLYTES - ADULT  Goal: Electrolytes maintained within normal limits  Description: INTERVENTIONS:  - Monitor labs and assess patient for signs and symptoms of electrolyte imbalances  - Administer electrolyte replacement as ordered  - Monitor response to electrolyte replacements, including repeat lab results as appropriate  - Instruct patient on fluid and nutrition as appropriate  Outcome: Progressing  Goal: Fluid balance maintained  Description: INTERVENTIONS:  - Monitor labs   - Monitor I/O and WT  - Instruct patient on fluid and nutrition as appropriate  - Assess for signs & symptoms of volume excess or deficit  Outcome: Progressing  Goal: Glucose maintained within target range  Description: INTERVENTIONS:  - Monitor Blood Glucose as ordered  - Assess for signs and symptoms of hyperglycemia and hypoglycemia  - Administer ordered medications to maintain glucose within target range  - Assess nutritional intake and initiate nutrition service referral as needed  Outcome: Progressing

## 2024-10-02 NOTE — NURSING NOTE
Removed two peripheral IV's. Discharge instructions reviewed with pt. Pt taken out of hospital in wheel chair with RN. Pt verbalized understanding of discharge instructions.

## 2024-10-02 NOTE — DISCHARGE SUMMARY
Discharge Summary - Critical Care/ICU   Name: Billy Zhang 64 y.o. male I MRN: [id number]  Unit/Bed#: ICU 04 I Date of Admission: 9/30/2024   Date of Service: 10/2/2024 I Hospital Day: 2    Assessment & Plan  Ventricular arrhythmia  Patient has history of cardiac arrest in 2018 secondary to 100% LAD occlusion   He underwent PCI with CRUZ to LAD   EF at that time 20-25% prompting AICD placement   Presents with shortness of breath   Stable Ventricular arrhythmia noted on admission   Patient denies AICD defibrillation   TSH abnormal at 10.049.  However, free T4 within normal limits   Trops negative  AICD interrogation- NSVT, occassional a fib  Trial of amiodarone failed to convert, transitioned to procainamide   S/p Procainamide gtt    Plan:  S/p DCCV on 10/1, successfully returned to sinus rhythm  Per cardiology, to continue:  IV amio load x24hrs  PO amiodarone 400mg BID for 7 days  Lopressor 12.5 BID  Entresto as BP allows  Diuresis as BP allows  Hypokalemia noted, will replete electrolytes to maintain K > 4.0 and Mg >2.0  ECHO pending formal read  Appreciate cardiology recommendations  Cardiac arrest (HCC)  Remote history from 2018  Secondary to 100% LAD occlusion  CAD (coronary artery disease)  Had PCI to LAD in 2018   Maintained on ASA/palvix/statin/BB/entresto  Will continue ASA and plavix   Resume statin, transaminases minimally elevated  Now s/p DCCV on 10/1, returned to NSR  Will restart BB, entresto as BP allows  Transaminitis  Patient states statin relate  Atorvastatin 40 mg (home dose) has been held in the outpatient setting  Overall down trending  Lipitor restarted while inpatient  Type 2 diabetes mellitus, without long-term current use of insulin (HCC)  Lab Results   Component Value Date    HGBA1C 7.3 (H) 10/01/2024     Recent Labs     10/01/24  1751 10/01/24  2151 10/01/24  2329 10/02/24  0746   POCGLU 154* 124 101 153*     Blood Sugar Average: Last 72 hrs:  (P) 140.4134159441736156  Will hold  "home dose agents  Utilize SSI coverage while in acute phase and NPO   Restarted diet, continue insulin QID  Hypokalemia  Replete to maintain >4.0  Repeat BMP to ensure correction   Acute respiratory failure with hypoxia (HCC)  Suspect 2/2 undiagnosed AV  Remains on Nasal cannula s/p DCCV  Wean as able to maintain spo2 >92%    Admission Date: 9/30/2024 1819  Discharge Date: 10/02/24  Admitting Diagnosis: V-tach (HCC) [I47.20]  SOB (shortness of breath) [R06.02]  Atrial fib/flutter, transient (HCC) [I48.91, I48.92]  Discharge Diagnosis:   Medical Problems       Resolved Problems  Date Reviewed: 10/1/2024   None         HPI per Laina DOTY: \"Billy Zhang is a 64 y.o. who presents with a past medical history of cardiac arrest, CAD with PCI to LAD, ischemic cardiomyopathy with AICD placement, DM, HLD, anxiety, hypothyroidism.  He presents to the emergency department for evaluation of fatigue and shortness of breath since Friday.  He states that previous to that he was feeling in his normal state of health.  He denies and chest pain.  He did feel palpitations which he attributed to his anxiety.  In the emergency department, he was noted to be in a ventricular arrhythmia.  He was started on amiodarone which failed to susanne the rhythm.  He was transitioned to a procainamide gtt and was referred to the icu for further management and care\"      Procedures Performed: No orders of the defined types were placed in this encounter.      Summary of Hospital Course: Hospital Course: Presented to [facility] ICU early 10/1 AM with sustained VT with HRs averaging in the 160s. Was refractory to IV amiodarone boluses x2, metoprolol IV, and was then started on a procainamide infusion. HRs remaining in 130s, VT due to Brugada criteria. Was ultimately cardioverted 10/1 AM with Dr. Healy. Patient returned to normal sinus rhythm. IV loaded with amiodarone x1 day, transitioned to PO amiodarone taper to be continued on discharge. " "To continue lopressor, amiodarone, entresto, and lasix. To follow with Dr. Rodriguez in one week.    Significant Findings, Care, Treatment and Services Provided: S/p cardioversion 10/1.    Complications: None    Discharge Day Visit / Exam:   /83   Pulse 68   Temp 98.2 °F (36.8 °C) (Oral)   Resp 18   Ht 5' 11\" (1.803 m)   Wt 103 kg (227 lb)   SpO2 91%   BMI 31.66 kg/m²     Discussion with Family: Updated  (son) via phone.    Condition at Discharge: good     Discharge instructions/Information to patient and family:   See After Visit Summary (AVS) for information provided to patient and family.      Provisions for Follow-Up Care:  See after visit summary for information related to follow-up care and any pertinent home health orders.      PCP: Hugo Almonte MD    Disposition: Home    Planned Readmission: No     Discharge Medications:  See after visit summary for reconciled discharge medications provided to patient and family.      Discharge Statement:  I have spent a total time of 20 minutes in caring for this patient on the day of the visit/encounter.    LALITHA Lujan   "

## 2024-10-02 NOTE — ASSESSMENT & PLAN NOTE
Lab Results   Component Value Date    HGBA1C 7.3 (H) 10/01/2024     Recent Labs     10/01/24  1751 10/01/24  2151 10/01/24  2329 10/02/24  0746   POCGLU 154* 124 101 153*     Blood Sugar Average: Last 72 hrs:  (P) 140.9035915173413970  Will hold home dose agents  Utilize SSI coverage while in acute phase and NPO   Restarted diet, continue insulin QID

## 2024-10-02 NOTE — UTILIZATION REVIEW
Continued Stay Review    SEE INITIAL REVIEW AT BOTTOM    Date:                           Current Patient Class: Inpatient  Current Level of Care: Level 1 stepdown    HPI:64 y.o. male initially admitted on  09/30    Assessment/Plan:   Date: 10/02  Day 3: Has surpassed a 2nd midnight with active treatments and services.  Pt s/p DCCV on 10/01. Reports feeling better today.  UOP ~2L. Even for 24 hours (-90mL but amio gtt is predominant intake). On exam RRR, paced. Ext warm.  Plan: Continue PO amio load. ICD Reprogrammed to include ATP at slower threshold. Continue all PO medications. Continue GDMT for CAD- resume statin, give metoprolol if BP allows, continue Entresto.  Defer  addition of SGLT2-I given A1C 7.3 to out pt setting

## 2024-10-02 NOTE — DISCHARGE INSTR - AVS FIRST PAGE
Continue Home medications, especially:    Lopressor 12.5mg twice daily  Entresto 49-51mg twice daily  Lasix 20mg daily  Amiodarone at 400 mg twice day for 6 more days, then 200mg twice daily 2 weeks (10/8) , then 200 mg daily (10/22)    Plan to schedule appointment with Dr. Rodriguez in 1 week

## 2024-10-02 NOTE — PLAN OF CARE
Problem: PAIN - ADULT  Goal: Verbalizes/displays adequate comfort level or baseline comfort level  Description: Interventions:  - Encourage patient to monitor pain and request assistance  - Assess pain using appropriate pain scale  - Administer analgesics based on type and severity of pain and evaluate response  - Implement non-pharmacological measures as appropriate and evaluate response  - Consider cultural and social influences on pain and pain management  - Notify physician/advanced practitioner if interventions unsuccessful or patient reports new pain  Outcome: Progressing     Problem: INFECTION - ADULT  Goal: Absence or prevention of progression during hospitalization  Description: INTERVENTIONS:  - Assess and monitor for signs and symptoms of infection  - Monitor lab/diagnostic results  - Monitor all insertion sites, i.e. indwelling lines, tubes, and drains  - Monitor endotracheal if appropriate and nasal secretions for changes in amount and color  - [place] appropriate cooling/warming therapies per order  - Administer medications as ordered  - Instruct and encourage patient and family to use good hand hygiene technique  - Identify and instruct in appropriate isolation precautions for identified infection/condition  Outcome: Progressing  Goal: Absence of fever/infection during neutropenic period  Description: INTERVENTIONS:  - Monitor WBC    Outcome: Progressing     Problem: DISCHARGE PLANNING  Goal: Discharge to home or other facility with appropriate resources  Description: INTERVENTIONS:  - Identify barriers to discharge w/patient and caregiver  - Arrange for needed discharge resources and transportation as appropriate  - Identify discharge learning needs (meds, wound care, etc.)  - Arrange for interpretive services to assist at discharge as needed  - Refer to Case Management Department for coordinating discharge planning if the patient needs post-hospital services based on physician/advanced  practitioner order or complex needs related to functional status, cognitive ability, or social support system  Outcome: Progressing     Problem: Knowledge Deficit  Goal: Patient/family/caregiver demonstrates understanding of disease process, treatment plan, medications, and discharge instructions  Description: Complete learning assessment and assess knowledge base.  Interventions:  - Provide teaching at level of understanding  - Provide teaching via preferred learning methods  Outcome: Progressing     Problem: METABOLIC, FLUID AND ELECTROLYTES - ADULT  Goal: Electrolytes maintained within normal limits  Description: INTERVENTIONS:  - Monitor labs and assess patient for signs and symptoms of electrolyte imbalances  - Administer electrolyte replacement as ordered  - Monitor response to electrolyte replacements, including repeat lab results as appropriate  - Instruct patient on fluid and nutrition as appropriate  Outcome: Progressing     Problem: METABOLIC, FLUID AND ELECTROLYTES - ADULT  Goal: Fluid balance maintained  Description: INTERVENTIONS:  - Monitor labs   - Monitor I/O and WT  - Instruct patient on fluid and nutrition as appropriate  - Assess for signs & symptoms of volume excess or deficit  Outcome: Progressing     Problem: METABOLIC, FLUID AND ELECTROLYTES - ADULT  Goal: Glucose maintained within target range  Description: INTERVENTIONS:  - Monitor Blood Glucose as ordered  - Assess for signs and symptoms of hyperglycemia and hypoglycemia  - Administer ordered medications to maintain glucose within target range  - Assess nutritional intake and initiate nutrition service referral as needed  Outcome: Progressing

## 2024-10-03 NOTE — UTILIZATION REVIEW
NOTIFICATION OF ADMISSION DISCHARGE   This is a Notification of Discharge from [facility]. Please be advised that this patient has been discharge from our facility. Below you will find the admission and discharge date and time including the patient’s disposition.   UTILIZATION REVIEW CONTACT:  Jessica Hancock  Utilization   Network Utilization Review Department  Phone: [phone] x carefully listen to the prompts. All voicemails are confidential.  Email: [email or web address]     ADMISSION INFORMATION  PRESENTATION DATE: 9/30/2024  6:19 PM  OBERVATION ADMISSION DATE: N/A  INPATIENT ADMISSION DATE: 9/30/24 11:24 PM   DISCHARGE DATE: 10/2/2024  1:40 PM   DISPOSITION:Home/Self Care    Network Utilization Review Department  ATTENTION: Please call with any questions or concerns to [phone] and carefully listen to the prompts so that you are directed to the right person. All voicemails are confidential.   For Discharge needs, contact Care Management DC Support Team at [phone] opt. 2  Send all requests for admission clinical reviews, approved or denied determinations and any other requests to dedicated fax number below belonging to the campus where the patient is receiving treatment. List of dedicated fax numbers for the Facilities:  FACILITY NAME UR FAX NUMBER   ADMISSION DENIALS (Administrative/Medical Necessity) [phone]   DISCHARGE SUPPORT TEAM ([facility]) [phone]   PARENT CHILD HEALTH (Maternity/NICU/Pediatrics) [phone]   [facility] [phone]   [facility] [phone]   [facility] [phone]   [facility] [phone]   [facility] [phone]   [facility] [phone]   [facility] [phone]   [facility]  [phone]   [facility] [phone]   [facility] [phone]   [facility] [phone]   [facility] [phone]

## 2024-11-20 ENCOUNTER — TELEPHONE (OUTPATIENT)
Dept: GASTROENTEROLOGY | Facility: CLINIC | Age: 64
End: 2024-11-20

## 2024-11-20 NOTE — TELEPHONE ENCOUNTER
----- Message from Albert Cummings MD sent at 11/20/2024  1:40 PM EST -----  Can you get this patient on with me or one of the physician assistants for abnormal LFTs for an office visit from Dr. Rodriguez

## 2025-01-10 ENCOUNTER — CONSULT (OUTPATIENT)
Dept: GASTROENTEROLOGY | Facility: CLINIC | Age: 65
End: 2025-01-10
Payer: COMMERCIAL

## 2025-01-10 VITALS
OXYGEN SATURATION: 96 % | TEMPERATURE: 97.1 F | HEIGHT: 71 IN | HEART RATE: 84 BPM | SYSTOLIC BLOOD PRESSURE: 126 MMHG | BODY MASS INDEX: 32.2 KG/M2 | DIASTOLIC BLOOD PRESSURE: 80 MMHG | WEIGHT: 230 LBS

## 2025-01-10 DIAGNOSIS — K76.0 FATTY LIVER: ICD-10-CM

## 2025-01-10 DIAGNOSIS — R94.5 ABNORMAL RESULTS OF LIVER FUNCTION STUDIES: ICD-10-CM

## 2025-01-10 DIAGNOSIS — R74.8 ELEVATED LIVER ENZYMES: Primary | ICD-10-CM

## 2025-01-10 DIAGNOSIS — E11.65 TYPE 2 DIABETES MELLITUS WITH HYPERGLYCEMIA, WITHOUT LONG-TERM CURRENT USE OF INSULIN (HCC): ICD-10-CM

## 2025-01-10 DIAGNOSIS — Z11.59 ENCOUNTER FOR SCREENING FOR OTHER VIRAL DISEASES: ICD-10-CM

## 2025-01-10 PROCEDURE — 99203 OFFICE O/P NEW LOW 30 MIN: CPT | Performed by: PHYSICIAN ASSISTANT

## 2025-01-10 RX ORDER — REPAGLINIDE 2 MG/1
2 TABLET ORAL
COMMUNITY

## 2025-01-10 RX ORDER — BUSPIRONE HYDROCHLORIDE 15 MG/1
15 TABLET ORAL 3 TIMES DAILY
COMMUNITY
Start: 2024-12-23

## 2025-01-10 RX ORDER — CYCLOBENZAPRINE HCL 5 MG
5 TABLET ORAL 3 TIMES DAILY
COMMUNITY

## 2025-01-10 NOTE — PROGRESS NOTES
Name: Billy Zhang      : 1960      MRN: [id number]  Encounter Provider: Mary Turcios PA-C  Encounter Date: 1/10/2025   Encounter department: [facility] [place]  :  Assessment & Plan  Elevated liver enzymes  Patient is LFTs primarily hepatocellular and elevation.  Dating back to  upon lab review.  He has several risk factors that are likely contributing to elevated LFTs including amiodarone usage, obesity, thyroid dysfunction, fatty liver disease, hyperglycemia, and potential underlying congestive hepatopathy.  The pattern of elevation is not quite consistent with fatty liver disease as typically we see that the ALT is twice the AST.  He does not drink alcohol or take herbal supplements.  Amiodarone is also known to cause liver injury, and fortunately his cardiologist has decreased the dosage from 400 mg daily to 200 mg daily.  However, still concerned that amiodarone may be contributing to elevated liver enzymes and to thyroid dysfunction.  According to [employer], patients with underlying fatty liver disease are at higher risk for drug-induced liver injury from amiodarone.  -Check full liver workup  -Check elastography  -Referral to hepatology  -May require liver biopsy, but will defer to hepatology team  -Glycemic control, weight loss  -We discussed the risk of cirrhosis in the setting of fatty liver disease  -Close follow-up with endocrinology    Fatty liver    Orders:    US elastography; Future    Antimitochondrial antibody; Future    Anti-smooth muscle antibody, IgG; Future    Ceruloplasmin; Future    Chronic Hepatitis Panel; Future    Alpha-1-antitrypsin; Future    Hepatic function panel; Future    Protime-INR; Future    Hepatitis panel, acute; Future    Ferritin; Future    Iron Panel (Includes Ferritin, Iron Sat%, Iron, and TIBC); Future    Celiac Disease Panel; Future    Hemoglobin A1C; Future        History of Present Illness   HPI  Billy  "Vicente is a 64 y.o. male with history of type 2 diabetes with a recent A1c of 7.3, ventricular tachycardia status post ICD, ischemic cardiomyopathy, CAD, and hypothyroidism who presents     Patient with chronically elevated AST and ALT since 2021.  Most recent labs were drawn in November, AST was 145, , alk phos 79, and total bilirubin 0.4.  TSH drawn in September was 10.    Patient denies alcohol use, quit smoking when he had his MI in 2018.  There is no family history of these.    Patient has been on amiodarone intermittently, last use was in October for 30 days.  Last echocardiogram in October revealed an EF of 40%, systolic function is mildly reduced and global hypokinesis noted.  Right upper quadrant ultrasound in February 2023 revealed mild fatty liver.      Review of Systems   Constitutional:  Negative for appetite change, chills, diaphoresis, fatigue and unexpected weight change.   HENT:  Negative for sore throat and trouble swallowing.    Eyes:  Negative for discharge and redness.   Respiratory:  Negative for cough, shortness of breath and wheezing.    Cardiovascular:  Negative for chest pain and palpitations.   Gastrointestinal:  Negative for abdominal pain, anal bleeding, blood in stool, constipation, diarrhea, nausea, rectal pain and vomiting.   Endocrine: Negative for cold intolerance and heat intolerance.   Musculoskeletal:  Negative for joint swelling and myalgias.   Skin:  Negative for pallor and rash.   Neurological:  Negative for dizziness, tremors, weakness, light-headedness, numbness and headaches.   Hematological:  Negative for adenopathy. Does not bruise/bleed easily.   Psychiatric/Behavioral:  Negative for behavioral problems, confusion, dysphoric mood and sleep disturbance. The patient is not nervous/anxious.           Objective   /80 (BP Location: Right arm, Patient Position: Sitting, Cuff Size: Standard)   Pulse 84   Temp (!) 97.1 °F (36.2 °C) (Tympanic)   Ht 5' 11\" " (1.803 m)   Wt 104 kg (230 lb)   SpO2 96%   BMI 32.08 kg/m²

## 2025-01-24 ENCOUNTER — TELEPHONE (OUTPATIENT)
Dept: GASTROENTEROLOGY | Facility: CLINIC | Age: 65
End: 2025-01-24

## 2025-02-04 ENCOUNTER — TELEPHONE (OUTPATIENT)
Dept: GASTROENTEROLOGY | Facility: CLINIC | Age: 65
End: 2025-02-04

## 2025-03-14 ENCOUNTER — HOSPITAL ENCOUNTER (OUTPATIENT)
Dept: ULTRASOUND IMAGING | Facility: HOSPITAL | Age: 65
End: 2025-03-14
Payer: COMMERCIAL

## 2025-03-14 DIAGNOSIS — R74.8 ELEVATED LIVER ENZYMES: ICD-10-CM

## 2025-03-14 DIAGNOSIS — K76.0 FATTY LIVER: ICD-10-CM

## 2025-03-14 PROCEDURE — 76981 USE PARENCHYMA: CPT

## 2025-03-18 ENCOUNTER — OFFICE VISIT (OUTPATIENT)
Dept: GASTROENTEROLOGY | Facility: CLINIC | Age: 65
End: 2025-03-18
Payer: COMMERCIAL

## 2025-03-18 VITALS
WEIGHT: 226 LBS | DIASTOLIC BLOOD PRESSURE: 80 MMHG | HEART RATE: 73 BPM | HEIGHT: 71 IN | OXYGEN SATURATION: 96 % | SYSTOLIC BLOOD PRESSURE: 115 MMHG | BODY MASS INDEX: 31.64 KG/M2 | TEMPERATURE: 97.6 F

## 2025-03-18 DIAGNOSIS — Z11.59 ENCOUNTER FOR SCREENING FOR OTHER VIRAL DISEASES: ICD-10-CM

## 2025-03-18 DIAGNOSIS — K76.0 METABOLIC DYSFUNCTION-ASSOCIATED STEATOTIC LIVER DISEASE (MASLD): ICD-10-CM

## 2025-03-18 DIAGNOSIS — R74.8 ELEVATED LIVER ENZYMES: Primary | ICD-10-CM

## 2025-03-18 DIAGNOSIS — D50.9 IRON DEFICIENCY ANEMIA, UNSPECIFIED IRON DEFICIENCY ANEMIA TYPE: ICD-10-CM

## 2025-03-18 PROCEDURE — 99214 OFFICE O/P EST MOD 30 MIN: CPT | Performed by: FAMILY MEDICINE

## 2025-03-18 PROCEDURE — G2211 COMPLEX E/M VISIT ADD ON: HCPCS | Performed by: FAMILY MEDICINE

## 2025-03-18 RX ORDER — BRINZOLAMIDE 10 MG/ML
SUSPENSION/ DROPS OPHTHALMIC
COMMUNITY

## 2025-03-18 RX ORDER — LATANOPROST 50 UG/ML
1 SOLUTION/ DROPS OPHTHALMIC DAILY
COMMUNITY

## 2025-03-18 RX ORDER — LORATADINE 10 MG/1
10 CAPSULE, LIQUID FILLED ORAL DAILY
COMMUNITY

## 2025-03-18 RX ORDER — BRINZOLAMIDE/BRIMONIDINE TARTRATE 10; 2 MG/ML; MG/ML
1 SUSPENSION/ DROPS OPHTHALMIC 2 TIMES DAILY
COMMUNITY

## 2025-03-18 RX ORDER — ERGOCALCIFEROL 1.25 MG/1
CAPSULE, LIQUID FILLED ORAL
COMMUNITY
Start: 2025-01-30

## 2025-03-18 RX ORDER — LINAGLIPTIN 5 MG/1
5 TABLET, FILM COATED ORAL DAILY
COMMUNITY

## 2025-03-18 NOTE — Clinical Note
Can we fax this note to the attention of both his cardiologist and endocrinologist with [facility]? Thank you!

## 2025-03-18 NOTE — PROGRESS NOTES
Name: Billy Zhang      : 1960      MRN: [id number]  Encounter Provider: Rosanne Ross PA-C  Encounter Date: 3/18/2025   Encounter department: [facility] [place]  :  Assessment & Plan  Elevated liver enzymes  Patient with manage moderately elevated serum transaminases with ALT predominance since  also found to have mild and diffuse fatty changes liver on ultrasound. He had a brief period of significant elevations in  during a cardiac arrest and 2023 which was suspected to be due to Trulicity. There was also concern that his statin was contributing. Both of these medications have been held.  Of note, he is also taking amiodarone 200 mg once daily.    He has had a near complete serologic evaluation which has been unremarkable for autoimmune and genetic causes of liver disease. No clinical, serologic or radiographic evidence of chronic liver disease. He also had a US elastography on 3/14 for which results are still in process.    Suspect that his mild chronic elevations are secondary to MASLD in the setting of multiple metabolic risk factors. Low suspicion that his prior GLP-1a for statin for contributing but there is concern that amiodarone may be. No suspect OTC medications or herbal supplements.     Pending the results of his US elastography, we will plan to continue monitoring his liver chemistries with weight loss given that they have improved.  However, improvement may be due to the reduction in his amiodarone. Will also complete his serologic workup with a viral hepatitis panel and immunity testing.      If his US elastography shows advanced fatty fibrosis then would recommend a liver biopsy both for further evaluation of alternative causes and to definitively stage his fibrosis.    If his liver enzymes were to uptrend we will also recommend a liver biopsy as well as discussion with cardiology regarding a hold of his amiodarone.    May  resume statin therapy and encouraged GLP-1 a useful for glycemic control and weight loss seen as t these medications have proven favorable for the treatment of MASH.     Orders:  •  Hepatic function panel; Future  •  Hepatitis panel, acute; Future  •  CBC and differential; Future  •  TIBC Panel (incl. Iron, TIBC, % Iron Saturation); Future  •  Ferritin; Future  •  Hepatitis A antibody, total; Future  •  Hepatitis B surface antibody; Future    Metabolic dysfunction-associated steatotic liver disease (MASLD)  Refer to A/P above.  Orders:  •  Hepatic function panel; Future  •  Hepatitis panel, acute; Future  •  CBC and differential; Future  •  TIBC Panel (incl. Iron, TIBC, % Iron Saturation); Future  •  Ferritin; Future  •  Hepatitis A antibody, total; Future  •  Hepatitis B surface antibody; Future    Encounter for screening for other viral diseases  Refer to A/P above.  Orders:  •  Hepatitis panel, acute; Future  •  Hepatitis B surface antibody; Future    Iron deficiency anemia, unspecified iron deficiency anemia type  Patient was noted to have mild normocytic anemia in the absence of overt GI bleeding.  His most recent serologies show a Hgb of 11.9 but a ferritin of 18 s/o MINGO.  No prior endoscopic evaluation, family history of colon cancer or additional upper/lower GI alarm features.    Recommended both an EGD and colonoscopy for further investigation of MINGO (AVMs, advanced polyps, colon mass, other luminal abnormality etc).  Patient is extremely apprehensive to undergo bidirectional endoscopic evaluation despite extensive discussion regarding the risks of refusal.   Plan to recheck a CBC and complete iron studies now.  If with persistent MINGO would strongly reiterate recommendations.       Follow-up in 4 months or sooner if necessary.       History of Present Illness   HPI    Billy Zhang is a 64 y.o. male with PMH significant for obesity, CAD and STEMI, V-tach s/p ICD, ischemic cardiomyopathy, DM2 and  anxiety who presents today for consultation regarding elevated liver enzymes and fatty liver disease.    Billy is familiar with [facility] gastroenterology last seen by Mary Turcios PA-C on 1/10/2025. He has been noted to have mild-moderately elevated serum transaminases with ALT predominance since at least 2021. He was also seen to have a more significant degree of elevations during hospitalization in November 2018 for cardiac arrest.      RUQ US showed mild diffuse fatty change of the liver as well as a 1.1 cm echogenic focus in the upper pole the right kidney which may be an angiomyolipoma. He was ordered for serologic evaluation including a ASMA, AMA, A1AT level, ceruloplasmin level, ferritin and celiac panel which was unremarkable aside from a low ferritin level. He was also ordered for chronic hepatitis panel which was not drawn. He had an US elastography on 3/14 which is awaiting an official read.     His most recent labs (2/28) show an AST 94, ALT 92.     Denies a personal history of chronic liver disease. Denies a family history of liver disease or liver-related cancers. Denies any known past or current infection with viral hepatitis. Denies taking any herbal supplements, performance-enhancing drugs, or OTC/prescription medications not reflected on their med list. Denies EtOH use.     Reports being taken off of GLP-1 a therapy and statin therapy due to concerns regarding this worsening his fatty liver. Amiodarone was decreased to 200 mg once daily.       History obtained from: patient    Review of Systems   All other systems reviewed and are negative.    Pertinent Medical History     Medical History Reviewed by provider this encounter:  Tobacco  Allergies  Meds  Problems  Med Hx  Surg Hx  Fam Hx     .  Past Medical History   Past Medical History:   Diagnosis Date   • Arthritis    • Cardiac arrest (HCC) 11/2018   • Coronary artery disease    • Diabetes (HCC)    • Diabetes mellitus (HCC)    •  Hyperlipidemia    • Hypertension    • Renal disorder      Past Surgical History:   Procedure Laterality Date   • CARDIAC CATHETERIZATION     • CARDIAC DEFIBRILLATOR PLACEMENT       Family History   Problem Relation Age of Onset   • Diabetes Father    • Heart disease Father       reports that he quit smoking about 6 years ago. His smoking use included cigarettes. He started smoking about 36 years ago. He has a 7.5 pack-year smoking history. He has never used smokeless tobacco. He reports that he does not drink alcohol and does not use drugs.  Current Outpatient Medications   Medication Instructions   • amiodarone (PACERONE) 400 mg, Oral, 2 times daily with meals   • aspirin 81 mg, Oral, Daily   • atorvastatin (LIPITOR) 40 mg, Oral, Daily with dinner   • brinzolamide (Azopt) 1 % ophthalmic suspension    • Brinzolamide-Brimonidine (Simbrinza) 1-0.2 % SUSP 1 drop, Ophthalmic, 2 times daily   • busPIRone (BUSPAR) 15 mg, 3 times daily   • Cholecalciferol 125 MCG (5000 UT) capsule 1 capsule, 2 times weekly   • clopidogrel (PLAVIX) 75 mg, Daily   • cyclobenzaprine (FLEXERIL) 5 mg, 3 times daily   • digoxin (DIGITEK) 125 mcg, Daily   • Drisdol 1.25 MG (33451 UT) capsule Take 1 po twice a week Disp # 8   • Empagliflozin 25 mg, Daily   • furosemide (LASIX) 20 mg, Oral, Daily   • latanoprost (XALATAN) 0.005 % ophthalmic solution 1 drop, Daily   • levothyroxine 125 mcg, Oral, Daily (early morning)   • lisinopril (ZESTRIL) 10 mg, Oral, Daily   • Loratadine 10 mg, Daily   • metFORMIN (FORTAMET) 1,000 mg, Oral, Daily with breakfast   • metoprolol tartrate (LOPRESSOR) 12.5 mg, Oral, Every 12 hours scheduled   • PARoxetine (PAXIL) 10 mg, Daily   • potassium chloride (K-DUR) 10 mEq tablet 10 mEq, 2 times daily   • repaglinide (PRANDIN) 2 mg, 3 times daily before meals   • sacubitril-valsartan (ENTRESTO) 49-51 MG TABS 1 tablet, Oral, 2 times daily   • tamsulosin (FLOMAX) 0.4 mg, Oral, Daily with dinner   • Tradjenta 5 mg, Daily   No  Known Allergies   Current Outpatient Medications on File Prior to Visit   Medication Sig Dispense Refill   • amiodarone (PACERONE) 400 MG tablet Take 1 tablet (400 mg total) by mouth 2 (two) times a day with meals for 12 doses (Patient taking differently: Take 200 mg by mouth daily) 30 tablet 0   • aspirin 81 mg chewable tablet Chew 1 tablet (81 mg total) daily 90 tablet 0   • busPIRone (BUSPAR) 15 mg tablet Take 15 mg by mouth Three times a day     • Cholecalciferol 125 MCG (5000 UT) capsule Take 1 capsule by mouth 2 (two) times a week     • clopidogrel (PLAVIX) 75 mg tablet Take 75 mg by mouth daily     • cyclobenzaprine (FLEXERIL) 5 mg tablet Take 5 mg by mouth Three times a day     • digoxin (DIGITEK) 0.125 mg tablet Take 125 mcg by mouth daily     • Drisdol 1.25 MG (22002 UT) capsule Take 1 po twice a week Disp # 8     • Empagliflozin 25 MG TABS Take 25 mg by mouth daily     • furosemide (LASIX) 20 mg tablet Take 1 tablet (20 mg total) by mouth daily 90 tablet 0   • levothyroxine 125 mcg tablet Take 1 tablet (125 mcg total) by mouth daily in the early morning     • lisinopril (ZESTRIL) 10 mg tablet Take 1 tablet (10 mg total) by mouth daily 90 tablet 0   • Loratadine 10 MG CAPS Take 10 mg by mouth daily     • metoprolol tartrate (LOPRESSOR) 25 mg tablet Take 0.5 tablets (12.5 mg total) by mouth every 12 (twelve) hours     • PARoxetine (PAXIL) 10 mg tablet Take 10 mg by mouth daily     • potassium chloride (K-DUR) 10 mEq tablet Take 10 mEq by mouth 2 (two) times a day     • repaglinide (PRANDIN) 2 mg tablet Take 2 mg by mouth 3 (three) times a day before meals     • sacubitril-valsartan (ENTRESTO) 49-51 MG TABS Take 1 tablet by mouth 2 (two) times a day 60 tablet 1   • tamsulosin (FLOMAX) 0.4 mg Take 1 capsule (0.4 mg total) by mouth daily with dinner (Patient taking differently: Take 0.4 mg by mouth if needed) 90 capsule 0   • Tradjenta 5 MG TABS Take 5 mg by mouth daily     • atorvastatin (LIPITOR) 40 mg  "tablet Take 1 tablet (40 mg total) by mouth daily with dinner (Patient not taking: Reported on 1/10/2025) 90 tablet 0   • brinzolamide (Azopt) 1 % ophthalmic suspension      • Brinzolamide-Brimonidine (Simbrinza) 1-0.2 % SUSP Apply 1 drop to eye 2 (two) times a day     • latanoprost (XALATAN) 0.005 % ophthalmic solution 1 drop daily     • metFORMIN (FORTAMET) 1000 MG (OSM) 24 hr tablet Take 1 tablet (1,000 mg total) by mouth daily with breakfast (Patient not taking: Reported on 1/10/2025) 30 tablet 0     No current facility-administered medications on file prior to visit.      Social History     Tobacco Use   • Smoking status: Former     Current packs/day: 0.00     Average packs/day: 0.3 packs/day for 30.0 years (7.5 ttl pk-yrs)     Types: Cigarettes     Start date: 1988     Quit date: 2018     Years since quittin.3   • Smokeless tobacco: Never   Vaping Use   • Vaping status: Never Used   Substance and Sexual Activity   • Alcohol use: Never   • Drug use: No   • Sexual activity: Not Currently        Objective   /80 (BP Location: Right arm, Patient Position: Sitting, Cuff Size: Large)   Pulse 73   Temp 97.6 °F (36.4 °C) (Tympanic)   Ht 5' 11\" (1.803 m)   Wt 103 kg (226 lb)   SpO2 96%   BMI 31.52 kg/m²      Physical Exam  Vitals and nursing note reviewed.   Constitutional:       General: He is not in acute distress.     Appearance: Normal appearance. He is well-developed. He is not ill-appearing or toxic-appearing.   HENT:      Head: Normocephalic and atraumatic.   Eyes:      General: No scleral icterus.     Conjunctiva/sclera: Conjunctivae normal.   Cardiovascular:      Heart sounds: Normal heart sounds.   Pulmonary:      Effort: Pulmonary effort is normal. No respiratory distress.      Breath sounds: Normal breath sounds.   Abdominal:      General: Bowel sounds are normal. There is no distension.      Palpations: Abdomen is soft. There is no mass.      Tenderness: There is no abdominal " tenderness.   Musculoskeletal:      Right lower leg: No edema.      Left lower leg: No edema.   Skin:     General: Skin is warm and dry.      Coloration: Skin is not jaundiced.      Findings: No bruising or rash.   Neurological:      Mental Status: He is alert and oriented to person, place, and time. Mental status is at baseline.   Psychiatric:         Mood and Affect: Mood normal.         Behavior: Behavior normal.

## 2025-03-19 ENCOUNTER — TELEPHONE (OUTPATIENT)
Age: 65
End: 2025-03-19

## 2025-03-19 ENCOUNTER — TRANSCRIBE ORDERS (OUTPATIENT)
Age: 65
End: 2025-03-19

## 2025-03-19 NOTE — TELEPHONE ENCOUNTER
Left voicemail message today @ [phone] for Pt to call back office in order to confirm outside network cardiologist and endocrinologist.

## 2025-03-19 NOTE — TELEPHONE ENCOUNTER
----- Message from Rosanne Ross PA-C sent at 3/18/2025  9:49 PM EDT -----  Can we fax this note to the attention of both his cardiologist and endocrinologist with [facility]? Thank you!

## 2025-03-21 ENCOUNTER — RESULTS FOLLOW-UP (OUTPATIENT)
Dept: GASTROENTEROLOGY | Facility: CLINIC | Age: 65
End: 2025-03-21